# Patient Record
Sex: MALE | Race: WHITE | NOT HISPANIC OR LATINO | Employment: OTHER | URBAN - METROPOLITAN AREA
[De-identification: names, ages, dates, MRNs, and addresses within clinical notes are randomized per-mention and may not be internally consistent; named-entity substitution may affect disease eponyms.]

---

## 2017-02-28 ENCOUNTER — ALLSCRIPTS OFFICE VISIT (OUTPATIENT)
Dept: OTHER | Facility: OTHER | Age: 82
End: 2017-02-28

## 2017-03-10 ENCOUNTER — HOSPITAL ENCOUNTER (OUTPATIENT)
Dept: NON INVASIVE DIAGNOSTICS | Facility: HOSPITAL | Age: 82
Discharge: HOME | End: 2017-03-10
Payer: MEDICARE

## 2017-03-10 DIAGNOSIS — I48.91 AFIB (HCC): ICD-10-CM

## 2017-03-10 PROCEDURE — 93226 XTRNL ECG REC<48 HR SCAN A/R: CPT

## 2017-03-10 PROCEDURE — 93225 XTRNL ECG REC<48 HRS REC: CPT

## 2017-04-06 ENCOUNTER — APPOINTMENT (EMERGENCY)
Dept: RADIOLOGY | Facility: HOSPITAL | Age: 82
End: 2017-04-06
Payer: MEDICARE

## 2017-04-06 ENCOUNTER — HOSPITAL ENCOUNTER (EMERGENCY)
Facility: HOSPITAL | Age: 82
Discharge: HOME | End: 2017-04-06
Attending: EMERGENCY MEDICINE | Admitting: EMERGENCY MEDICINE
Payer: MEDICARE

## 2017-04-06 ENCOUNTER — GENERIC CONVERSION - ENCOUNTER (OUTPATIENT)
Dept: OTHER | Facility: OTHER | Age: 82
End: 2017-04-06

## 2017-04-06 VITALS
DIASTOLIC BLOOD PRESSURE: 87 MMHG | TEMPERATURE: 98.4 F | BODY MASS INDEX: 29.35 KG/M2 | OXYGEN SATURATION: 94 % | HEART RATE: 53 BPM | RESPIRATION RATE: 19 BRPM | WEIGHT: 187 LBS | SYSTOLIC BLOOD PRESSURE: 167 MMHG | HEIGHT: 67 IN
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
## 2017-04-06 DIAGNOSIS — S39.012A LUMBAR STRAIN, INITIAL ENCOUNTER: ICD-10-CM

## 2017-04-06 DIAGNOSIS — R11.0 NAUSEA: ICD-10-CM

## 2017-04-06 LAB
ANION GAP SERPL CALCULATED.3IONS-SCNC: 7 MMOL/L (ref 4–13)
BASOPHILS # BLD MANUAL: 0.1 THOUSAND/UL (ref 0–0.1)
BASOPHILS NFR MAR MANUAL: 1 % (ref 0–1)
BUN SERPL-MCNC: 24 MG/DL (ref 5–25)
BURR CELLS BLD QL SMEAR: PRESENT
CALCIUM SERPL-MCNC: 8.4 MG/DL (ref 8.3–10.1)
CHLORIDE SERPL-SCNC: 107 MMOL/L (ref 100–108)
CO2 SERPL-SCNC: 24 MMOL/L (ref 21–32)
CREAT SERPL-MCNC: 1.28 MG/DL (ref 0.6–1.3)
EOSINOPHIL # BLD MANUAL: 0 THOUSAND/UL (ref 0–0.4)
EOSINOPHIL NFR BLD MANUAL: 0 % (ref 0–6)
ERYTHROCYTE [DISTWIDTH] IN BLOOD BY AUTOMATED COUNT: 13.5 % (ref 11.6–15.1)
GFR SERPL CREATININE-BSD FRML MDRD: 53 ML/MIN/1.73SQ M
GLUCOSE SERPL-MCNC: 189 MG/DL (ref 65–140)
HCT VFR BLD AUTO: 41.6 % (ref 36.5–49.3)
HGB BLD-MCNC: 14.1 G/DL (ref 12–17)
LYMPHOCYTES # BLD AUTO: 0.69 THOUSAND/UL (ref 0.6–4.47)
LYMPHOCYTES # BLD AUTO: 7 % (ref 14–44)
MCH RBC QN AUTO: 29.9 PG (ref 26.8–34.3)
MCHC RBC AUTO-ENTMCNC: 33.9 G/DL (ref 31.4–37.4)
MCV RBC AUTO: 88 FL (ref 82–98)
MONOCYTES # BLD AUTO: 0.39 THOUSAND/UL (ref 0–1.22)
MONOCYTES NFR BLD: 4 % (ref 4–12)
NEUTROPHILS # BLD MANUAL: 8.52 THOUSAND/UL (ref 1.85–7.62)
NEUTS SEG NFR BLD AUTO: 87 % (ref 43–75)
NRBC BLD AUTO-RTO: 0 /100 WBCS
NT-PROBNP SERPL-MCNC: 3717 PG/ML
PLATELET # BLD AUTO: 117 THOUSANDS/UL (ref 149–390)
PLATELET BLD QL SMEAR: ABNORMAL
PMV BLD AUTO: 10.4 FL (ref 8.9–12.7)
POIKILOCYTOSIS BLD QL SMEAR: PRESENT
POTASSIUM SERPL-SCNC: 3.8 MMOL/L (ref 3.5–5.3)
RBC # BLD AUTO: 4.71 MILLION/UL (ref 3.88–5.62)
RBC MORPH BLD: PRESENT
SODIUM SERPL-SCNC: 138 MMOL/L (ref 136–145)
SPECIMEN SOURCE: NORMAL
TROPONIN I BLD-MCNC: 0 NG/ML (ref 0–0.08)
VARIANT LYMPHS # BLD AUTO: 1 %
WBC # BLD AUTO: 9.79 THOUSAND/UL (ref 4.31–10.16)

## 2017-04-06 PROCEDURE — 70450 CT HEAD/BRAIN W/O DYE: CPT

## 2017-04-06 PROCEDURE — 93005 ELECTROCARDIOGRAM TRACING: CPT | Performed by: EMERGENCY MEDICINE

## 2017-04-06 PROCEDURE — 96374 THER/PROPH/DIAG INJ IV PUSH: CPT

## 2017-04-06 PROCEDURE — 71020 HB CHEST X-RAY 2VW FRONTAL&LATL: CPT

## 2017-04-06 PROCEDURE — 96375 TX/PRO/DX INJ NEW DRUG ADDON: CPT

## 2017-04-06 PROCEDURE — 85007 BL SMEAR W/DIFF WBC COUNT: CPT | Performed by: EMERGENCY MEDICINE

## 2017-04-06 PROCEDURE — 99285 EMERGENCY DEPT VISIT HI MDM: CPT

## 2017-04-06 PROCEDURE — 83880 ASSAY OF NATRIURETIC PEPTIDE: CPT | Performed by: EMERGENCY MEDICINE

## 2017-04-06 PROCEDURE — 85027 COMPLETE CBC AUTOMATED: CPT | Performed by: EMERGENCY MEDICINE

## 2017-04-06 PROCEDURE — 84484 ASSAY OF TROPONIN QUANT: CPT

## 2017-04-06 PROCEDURE — 80048 BASIC METABOLIC PNL TOTAL CA: CPT | Performed by: EMERGENCY MEDICINE

## 2017-04-06 RX ORDER — ONDANSETRON 2 MG/ML
4 INJECTION INTRAMUSCULAR; INTRAVENOUS ONCE
Status: COMPLETED | OUTPATIENT
Start: 2017-04-06 | End: 2017-04-06

## 2017-04-06 RX ORDER — ONDANSETRON 4 MG/1
4 TABLET, FILM COATED ORAL EVERY 6 HOURS
Qty: 120 TABLET | Refills: 0 | Status: SHIPPED | OUTPATIENT
Start: 2017-04-06 | End: 2017-12-13

## 2017-04-06 RX ORDER — FUROSEMIDE 10 MG/ML
20 INJECTION INTRAMUSCULAR; INTRAVENOUS ONCE
Status: COMPLETED | OUTPATIENT
Start: 2017-04-06 | End: 2017-04-06

## 2017-04-06 RX ADMIN — ONDANSETRON 4 MG: 2 INJECTION INTRAMUSCULAR; INTRAVENOUS at 11:05

## 2017-04-06 RX ADMIN — FUROSEMIDE 20 MG: 10 INJECTION, SOLUTION INTRAMUSCULAR; INTRAVENOUS at 13:01

## 2017-04-07 LAB
ATRIAL RATE: 258 BPM
P AXIS: 232 DEGREES
QRS AXIS: 31 DEGREES
QRSD INTERVAL: 90 MS
QT INTERVAL: 438 MS
QTC INTERVAL: 419 MS
T WAVE AXIS: -48 DEGREES
VENTRICULAR RATE: 55 BPM

## 2017-06-02 ENCOUNTER — APPOINTMENT (EMERGENCY)
Dept: CT IMAGING | Facility: HOSPITAL | Age: 82
End: 2017-06-02
Payer: MEDICARE

## 2017-06-02 ENCOUNTER — HOSPITAL ENCOUNTER (EMERGENCY)
Facility: HOSPITAL | Age: 82
Discharge: HOME | End: 2017-06-02
Attending: EMERGENCY MEDICINE | Admitting: EMERGENCY MEDICINE
Payer: MEDICARE

## 2017-06-02 ENCOUNTER — APPOINTMENT (EMERGENCY)
Dept: RADIOLOGY | Facility: HOSPITAL | Age: 82
End: 2017-06-02
Payer: MEDICARE

## 2017-06-02 VITALS
RESPIRATION RATE: 20 BRPM | OXYGEN SATURATION: 96 % | SYSTOLIC BLOOD PRESSURE: 135 MMHG | DIASTOLIC BLOOD PRESSURE: 78 MMHG | HEART RATE: 59 BPM | TEMPERATURE: 97.7 F | BODY MASS INDEX: 28.45 KG/M2 | WEIGHT: 181.66 LBS

## 2017-06-02 DIAGNOSIS — S09.90XA CLOSED HEAD INJURY, INITIAL ENCOUNTER: ICD-10-CM
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
## 2017-06-02 DIAGNOSIS — S41.111A SKIN TEAR OF RIGHT UPPER ARM WITHOUT COMPLICATION, INITIAL ENCOUNTER: Primary | ICD-10-CM

## 2017-06-02 DIAGNOSIS — S00.03XA HEMATOMA OF SCALP, INITIAL ENCOUNTER: ICD-10-CM

## 2017-06-02 PROCEDURE — 72100 X-RAY EXAM L-S SPINE 2/3 VWS: CPT

## 2017-06-02 PROCEDURE — 70450 CT HEAD/BRAIN W/O DYE: CPT

## 2017-06-02 PROCEDURE — 99284 EMERGENCY DEPT VISIT MOD MDM: CPT

## 2017-06-07 ENCOUNTER — GENERIC CONVERSION - ENCOUNTER (OUTPATIENT)
Dept: OTHER | Facility: OTHER | Age: 82
End: 2017-06-07

## 2017-06-07 ENCOUNTER — HOSPITAL ENCOUNTER (EMERGENCY)
Facility: HOSPITAL | Age: 82
Discharge: NURSING, REHAB OR LONG-TERM CARE | End: 2017-06-07
Attending: EMERGENCY MEDICINE | Admitting: EMERGENCY MEDICINE
Payer: MEDICARE

## 2017-06-07 VITALS
DIASTOLIC BLOOD PRESSURE: 81 MMHG | OXYGEN SATURATION: 99 % | RESPIRATION RATE: 18 BRPM | HEART RATE: 50 BPM | WEIGHT: 180.34 LBS | SYSTOLIC BLOOD PRESSURE: 146 MMHG | TEMPERATURE: 97.6 F | BODY MASS INDEX: 28.24 KG/M2

## 2017-06-07 DIAGNOSIS — R53.83 FATIGUE: Primary | ICD-10-CM

## 2017-06-07 DIAGNOSIS — R41.0 TRANSIENT DISORIENTATION: ICD-10-CM

## 2017-06-07 LAB
ALBUMIN SERPL BCP-MCNC: 3.4 G/DL (ref 3.5–5)
ALP SERPL-CCNC: 65 U/L (ref 46–116)
ALT SERPL W P-5'-P-CCNC: 12 U/L (ref 12–78)
ANION GAP SERPL CALCULATED.3IONS-SCNC: 8 MMOL/L (ref 4–13)
AST SERPL W P-5'-P-CCNC: 15 U/L (ref 5–45)
ATRIAL RATE: 326 BPM
BASOPHILS # BLD AUTO: 0.03 THOUSANDS/ΜL (ref 0–0.1)
BASOPHILS NFR BLD AUTO: 1 % (ref 0–1)
BILIRUB SERPL-MCNC: 0.8 MG/DL (ref 0.2–1)
BUN SERPL-MCNC: 23 MG/DL (ref 5–25)
CALCIUM SERPL-MCNC: 8.5 MG/DL (ref 8.3–10.1)
CHLORIDE SERPL-SCNC: 109 MMOL/L (ref 100–108)
CLARITY, POC: CLEAR
CO2 SERPL-SCNC: 28 MMOL/L (ref 21–32)
COLOR, POC: YELLOW
CREAT SERPL-MCNC: 1.38 MG/DL (ref 0.6–1.3)
EOSINOPHIL # BLD AUTO: 0.13 THOUSAND/ΜL (ref 0–0.61)
EOSINOPHIL NFR BLD AUTO: 3 % (ref 0–6)
ERYTHROCYTE [DISTWIDTH] IN BLOOD BY AUTOMATED COUNT: 14.2 % (ref 11.6–15.1)
EXT BILIRUBIN, UA: ABNORMAL
EXT BLOOD URINE: ABNORMAL
EXT GLUCOSE, UA: ABNORMAL
EXT KETONES: ABNORMAL
EXT NITRITE, UA: ABNORMAL
EXT PH, UA: 6.5
EXT PROTEIN, UA: ABNORMAL
EXT SPECIFIC GRAVITY, UA: 1.01
EXT UROBILINOGEN: 0.2
GFR SERPL CREATININE-BSD FRML MDRD: 48.6 ML/MIN/1.73SQ M
GLUCOSE SERPL-MCNC: 96 MG/DL (ref 65–140)
HCT VFR BLD AUTO: 39.6 % (ref 36.5–49.3)
HGB BLD-MCNC: 13.3 G/DL (ref 12–17)
LYMPHOCYTES # BLD AUTO: 1.21 THOUSANDS/ΜL (ref 0.6–4.47)
LYMPHOCYTES NFR BLD AUTO: 24 % (ref 14–44)
MCH RBC QN AUTO: 30 PG (ref 26.8–34.3)
MCHC RBC AUTO-ENTMCNC: 33.6 G/DL (ref 31.4–37.4)
MCV RBC AUTO: 89 FL (ref 82–98)
MONOCYTES # BLD AUTO: 0.49 THOUSAND/ΜL (ref 0.17–1.22)
MONOCYTES NFR BLD AUTO: 10 % (ref 4–12)
NEUTROPHILS # BLD AUTO: 3.29 THOUSANDS/ΜL (ref 1.85–7.62)
NEUTS SEG NFR BLD AUTO: 62 % (ref 43–75)
PLATELET # BLD AUTO: 143 THOUSANDS/UL (ref 149–390)
PMV BLD AUTO: 10.2 FL (ref 8.9–12.7)
POTASSIUM SERPL-SCNC: 3.8 MMOL/L (ref 3.5–5.3)
PROT SERPL-MCNC: 6.6 G/DL (ref 6.4–8.2)
QRS AXIS: 15 DEGREES
QRSD INTERVAL: 82 MS
QT INTERVAL: 444 MS
QTC INTERVAL: 428 MS
RBC # BLD AUTO: 4.44 MILLION/UL (ref 3.88–5.62)
SODIUM SERPL-SCNC: 145 MMOL/L (ref 136–145)
T WAVE AXIS: 36 DEGREES
VENTRICULAR RATE: 56 BPM
WBC # BLD AUTO: 5.15 THOUSAND/UL (ref 4.31–10.16)
WBC # BLD EST: ABNORMAL 10*3/UL

## 2017-06-07 PROCEDURE — 93005 ELECTROCARDIOGRAM TRACING: CPT

## 2017-06-07 PROCEDURE — 99285 EMERGENCY DEPT VISIT HI MDM: CPT

## 2017-06-07 PROCEDURE — 85025 COMPLETE CBC W/AUTO DIFF WBC: CPT | Performed by: EMERGENCY MEDICINE

## 2017-06-07 PROCEDURE — 81002 URINALYSIS NONAUTO W/O SCOPE: CPT | Performed by: EMERGENCY MEDICINE

## 2017-06-07 PROCEDURE — 36415 COLL VENOUS BLD VENIPUNCTURE: CPT | Performed by: EMERGENCY MEDICINE

## 2017-06-07 PROCEDURE — 80053 COMPREHEN METABOLIC PANEL: CPT | Performed by: EMERGENCY MEDICINE

## 2017-06-10 ENCOUNTER — HOSPITAL ENCOUNTER (EMERGENCY)
Facility: HOSPITAL | Age: 82
Discharge: HOME | End: 2017-06-10
Attending: EMERGENCY MEDICINE | Admitting: EMERGENCY MEDICINE
Payer: MEDICARE

## 2017-06-10 VITALS
OXYGEN SATURATION: 98 % | DIASTOLIC BLOOD PRESSURE: 62 MMHG | TEMPERATURE: 97.6 F | RESPIRATION RATE: 16 BRPM | HEART RATE: 57 BPM | SYSTOLIC BLOOD PRESSURE: 128 MMHG
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
## 2017-06-10 PROCEDURE — 99283 EMERGENCY DEPT VISIT LOW MDM: CPT

## 2017-08-11 ENCOUNTER — ALLSCRIPTS OFFICE VISIT (OUTPATIENT)
Dept: OTHER | Facility: OTHER | Age: 82
End: 2017-08-11

## 2017-08-11 DIAGNOSIS — I48.91 ATRIAL FIBRILLATION (HCC): ICD-10-CM

## 2017-08-11 DIAGNOSIS — I10 ESSENTIAL (PRIMARY) HYPERTENSION: ICD-10-CM

## 2017-08-11 DIAGNOSIS — E78.5 HYPERLIPIDEMIA: ICD-10-CM

## 2017-08-22 ENCOUNTER — HOSPITAL ENCOUNTER (OUTPATIENT)
Dept: NON INVASIVE DIAGNOSTICS | Facility: HOSPITAL | Age: 82
Discharge: HOME | End: 2017-08-22
Payer: MEDICARE

## 2017-08-22 DIAGNOSIS — I10 ESSENTIAL (PRIMARY) HYPERTENSION: ICD-10-CM

## 2017-08-22 DIAGNOSIS — I48.91 ATRIAL FIBRILLATION (HCC): ICD-10-CM

## 2017-08-22 DIAGNOSIS — E78.5 HYPERLIPIDEMIA: ICD-10-CM

## 2017-08-22 PROCEDURE — 93225 XTRNL ECG REC<48 HRS REC: CPT

## 2017-08-22 PROCEDURE — 93226 XTRNL ECG REC<48 HR SCAN A/R: CPT

## 2017-09-08 ENCOUNTER — HOSPITAL ENCOUNTER (OUTPATIENT)
Dept: NON INVASIVE DIAGNOSTICS | Facility: HOSPITAL | Age: 82
Discharge: HOME | End: 2017-09-08
Payer: MEDICARE

## 2017-09-08 DIAGNOSIS — I48.91 ATRIAL FIBRILLATION (HCC): ICD-10-CM

## 2017-09-08 PROCEDURE — 93225 XTRNL ECG REC<48 HRS REC: CPT

## 2017-09-08 PROCEDURE — 93226 XTRNL ECG REC<48 HR SCAN A/R: CPT

## 2017-12-13 ENCOUNTER — HOSPITAL ENCOUNTER (INPATIENT)
Facility: HOSPITAL | Age: 82
LOS: 3 days | Discharge: NURSING, REHAB OR LONG-TERM CARE | DRG: 551 | End: 2017-12-16
Attending: SURGERY | Admitting: SURGERY
Payer: MEDICARE

## 2017-12-13 ENCOUNTER — APPOINTMENT (EMERGENCY)
Dept: CT IMAGING | Facility: HOSPITAL | Age: 82
End: 2017-12-13
Payer: MEDICARE

## 2017-12-13 ENCOUNTER — HOSPITAL ENCOUNTER (EMERGENCY)
Facility: HOSPITAL | Age: 82
End: 2017-12-13
Attending: EMERGENCY MEDICINE | Admitting: EMERGENCY MEDICINE
Payer: MEDICARE

## 2017-12-13 VITALS
DIASTOLIC BLOOD PRESSURE: 81 MMHG | OXYGEN SATURATION: 98 % | RESPIRATION RATE: 18 BRPM | HEART RATE: 63 BPM | TEMPERATURE: 98.3 F | SYSTOLIC BLOOD PRESSURE: 168 MMHG | BODY MASS INDEX: 27.55 KG/M2 | WEIGHT: 175.93 LBS
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
## 2017-12-13 DIAGNOSIS — I60.9 SUBARACHNOID HEMORRHAGE (HCC): ICD-10-CM

## 2017-12-13 DIAGNOSIS — S06.5X9A SUBDURAL HEMATOMA, ACUTE (HCC): Primary | ICD-10-CM

## 2017-12-13 DIAGNOSIS — I48.91 ATRIAL FIBRILLATION (HCC): Chronic | ICD-10-CM

## 2017-12-13 DIAGNOSIS — S22.39XA CLOSED RIB FRACTURE: ICD-10-CM

## 2017-12-13 DIAGNOSIS — I60.9 SUBARACHNOID BLEED (HCC): ICD-10-CM

## 2017-12-13 DIAGNOSIS — S06.5X9A SUBDURAL HEMATOMA (HCC): Primary | ICD-10-CM

## 2017-12-13 DIAGNOSIS — S22.078A OTHER CLOSED FRACTURE OF NINTH THORACIC VERTEBRA, INITIAL ENCOUNTER (HCC): ICD-10-CM

## 2017-12-13 LAB
ALBUMIN SERPL BCP-MCNC: 3.1 G/DL (ref 3.5–5)
ALP SERPL-CCNC: 53 U/L (ref 46–116)
ALT SERPL W P-5'-P-CCNC: 14 U/L (ref 12–78)
ANION GAP SERPL CALCULATED.3IONS-SCNC: 8 MMOL/L (ref 4–13)
APTT PPP: 33 SECONDS (ref 23–35)
AST SERPL W P-5'-P-CCNC: 29 U/L (ref 5–45)
BASOPHILS # BLD AUTO: 0.02 THOUSANDS/ΜL (ref 0–0.1)
BASOPHILS NFR BLD AUTO: 0 % (ref 0–1)
BILIRUB SERPL-MCNC: 1.5 MG/DL (ref 0.2–1)
BUN SERPL-MCNC: 24 MG/DL (ref 5–25)
CALCIUM SERPL-MCNC: 8.2 MG/DL (ref 8.3–10.1)
CHLORIDE SERPL-SCNC: 106 MMOL/L (ref 100–108)
CO2 SERPL-SCNC: 23 MMOL/L (ref 21–32)
CREAT SERPL-MCNC: 1.32 MG/DL (ref 0.6–1.3)
EOSINOPHIL # BLD AUTO: 0.04 THOUSAND/ΜL (ref 0–0.61)
EOSINOPHIL NFR BLD AUTO: 0 % (ref 0–6)
ERYTHROCYTE [DISTWIDTH] IN BLOOD BY AUTOMATED COUNT: 14 % (ref 11.6–15.1)
GFR SERPL CREATININE-BSD FRML MDRD: 48 ML/MIN/1.73SQ M
GLUCOSE SERPL-MCNC: 125 MG/DL (ref 65–140)
HCT VFR BLD AUTO: 38.7 % (ref 36.5–49.3)
HGB BLD-MCNC: 12.8 G/DL (ref 12–17)
INR PPP: 1.15 (ref 0.86–1.16)
LYMPHOCYTES # BLD AUTO: 0.53 THOUSANDS/ΜL (ref 0.6–4.47)
LYMPHOCYTES NFR BLD AUTO: 6 % (ref 14–44)
MCH RBC QN AUTO: 30.5 PG (ref 26.8–34.3)
MCHC RBC AUTO-ENTMCNC: 33.1 G/DL (ref 31.4–37.4)
MCV RBC AUTO: 92 FL (ref 82–98)
MONOCYTES # BLD AUTO: 0.98 THOUSAND/ΜL (ref 0.17–1.22)
MONOCYTES NFR BLD AUTO: 11 % (ref 4–12)
NEUTROPHILS # BLD AUTO: 7.72 THOUSANDS/ΜL (ref 1.85–7.62)
NEUTS SEG NFR BLD AUTO: 83 % (ref 43–75)
PLATELET # BLD AUTO: 110 THOUSANDS/UL (ref 149–390)
PMV BLD AUTO: 10.4 FL (ref 8.9–12.7)
POTASSIUM SERPL-SCNC: 4.7 MMOL/L (ref 3.5–5.3)
PROT SERPL-MCNC: 6.5 G/DL (ref 6.4–8.2)
PROTHROMBIN TIME: 15.1 SECONDS (ref 12.1–14.4)
RBC # BLD AUTO: 4.19 MILLION/UL (ref 3.88–5.62)
SODIUM SERPL-SCNC: 137 MMOL/L (ref 136–145)
WBC # BLD AUTO: 9.29 THOUSAND/UL (ref 4.31–10.16)

## 2017-12-13 PROCEDURE — 72131 CT LUMBAR SPINE W/O DYE: CPT

## 2017-12-13 PROCEDURE — 99285 EMERGENCY DEPT VISIT HI MDM: CPT

## 2017-12-13 PROCEDURE — 85025 COMPLETE CBC W/AUTO DIFF WBC: CPT | Performed by: PHYSICIAN ASSISTANT

## 2017-12-13 PROCEDURE — 70450 CT HEAD/BRAIN W/O DYE: CPT

## 2017-12-13 PROCEDURE — 36415 COLL VENOUS BLD VENIPUNCTURE: CPT | Performed by: PHYSICIAN ASSISTANT

## 2017-12-13 PROCEDURE — 85730 THROMBOPLASTIN TIME PARTIAL: CPT | Performed by: PHYSICIAN ASSISTANT

## 2017-12-13 PROCEDURE — 72128 CT CHEST SPINE W/O DYE: CPT

## 2017-12-13 PROCEDURE — 80053 COMPREHEN METABOLIC PANEL: CPT | Performed by: PHYSICIAN ASSISTANT

## 2017-12-13 PROCEDURE — 85610 PROTHROMBIN TIME: CPT | Performed by: PHYSICIAN ASSISTANT

## 2017-12-13 RX ORDER — ACETAMINOPHEN 325 MG/1
975 TABLET ORAL EVERY 8 HOURS SCHEDULED
Status: DISCONTINUED | OUTPATIENT
Start: 2017-12-13 | End: 2017-12-16 | Stop reason: HOSPADM

## 2017-12-13 RX ORDER — OXYCODONE HYDROCHLORIDE 5 MG/1
2.5 TABLET ORAL EVERY 4 HOURS PRN
Status: DISCONTINUED | OUTPATIENT
Start: 2017-12-13 | End: 2017-12-16 | Stop reason: HOSPADM

## 2017-12-13 RX ORDER — ACETAMINOPHEN 325 MG/1
975 TABLET ORAL ONCE
Status: COMPLETED | OUTPATIENT
Start: 2017-12-13 | End: 2017-12-13

## 2017-12-13 RX ORDER — GABAPENTIN 100 MG/1
100 CAPSULE ORAL
Status: DISCONTINUED | OUTPATIENT
Start: 2017-12-13 | End: 2017-12-16 | Stop reason: HOSPADM

## 2017-12-13 RX ADMIN — DESMOPRESSIN ACETATE 20 MCG: 4 SOLUTION INTRAVENOUS at 22:06

## 2017-12-13 RX ADMIN — ACETAMINOPHEN 975 MG: 325 TABLET, FILM COATED ORAL at 22:01

## 2017-12-13 RX ADMIN — GABAPENTIN 100 MG: 100 CAPSULE ORAL at 22:02

## 2017-12-13 RX ADMIN — ACETAMINOPHEN 975 MG: 325 TABLET ORAL at 17:01

## 2017-12-13 RX ADMIN — HYDROMORPHONE HYDROCHLORIDE 0.2 MG: 1 INJECTION, SOLUTION INTRAMUSCULAR; INTRAVENOUS; SUBCUTANEOUS at 22:57

## 2017-12-13 NOTE — EMTALA/ACUTE CARE TRANSFER
[address] [address] [address]  Dept: [phone]      EMTALA TRANSFER CONSENT    NAME Cassius Barrera                                         1/15/1929                              MRN [id number]    I have been informed of my rights regarding examination, treatment, and transfer   by Dr Fabián Carroll DO    Benefits: Specialized equipment and/or services available at the receiving facility (Include comment)________________________ (Trauma ICU)    Risks:        Consent for Transfer:  I acknowledge that my medical condition has been evaluated and explained to me by the emergency department physician or other qualified medical person and/or my attending physician, who has recommended that I be transferred to the service of  Accepting Physician: Dr Juanita Casas at [address] Name, [address] : [facility]   The above potential benefits of such transfer, the potential risks associated with such transfer, and the probable risks of not being transferred have been explained to me, and I fully understand them  The doctor has explained that, in my case, the benefits of transfer outweigh the risks  I agree to be transferred  I authorize the performance of emergency medical procedures and treatments upon me in both transit and upon arrival at the receiving facility  Additionally, I authorize the release of any and all medical records to the receiving facility and request they be transported with me, if possible  I understand that the safest mode of transportation during a medical emergency is an ambulance and that the Hospital advocates the use of this mode of transport  Risks of traveling to the receiving facility by car, including absence of medical control, life sustaining equipment, such as oxygen, and medical personnel has been explained to me and I fully understand them      (DESTINI CORRECT BOX BELOW)  [  ]  I consent to the stated transfer and to be transported by ambulance/helicopter  [  ]  I consent to the stated transfer, but refuse transportation by ambulance and accept full responsibility for my transportation by car  I understand the risks of non-ambulance transfers and I exonerate the Hospital and its staff from any deterioration in my condition that results from this refusal     X___________________________________________    DATE  17  TIME________  Signature of patient or legally responsible individual signing on patient behalf           RELATIONSHIP TO PATIENT_________________________          Provider Certification    NAME Kenneth Elliott                                        [facility] 1/15/1929                              MRN [id number]    A medical screening exam was performed on the above named patient  Based on the examination:    Condition Necessitating Transfer The primary encounter diagnosis was Subdural hematoma, acute ([address] )  Diagnoses of Subarachnoid hemorrhage ([address] ), Other closed fracture of ninth thoracic vertebra, initial encounter ([address] ), Fall, initial encounter, and Closed rib fracture were also pertinent to this visit      Patient Condition: The patient has been stabilized such that within reasonable medical probability, no material deterioration of the patient condition or the condition of the unborn child(mali) is likely to result from the transfer    Reason for Transfer: Level of Care needed not available at this facility    Transfer Requirements: [facility] k[facility]    · Space available and qualified personnel available for treatment as acknowledged by    · Agreed to accept transfer and to provide appropriate medical treatment as acknowledged by       Dr Chandan Reyes  · Appropriate medical records of the examination and treatment of the patient are provided at the time of transfer   [address] _______  · Transfer will be performed by qualified personnel from    and appropriate transfer equipment as required, including the use of necessary and appropriate life support measures  Provider Certification: I have examined the patient and explained the following risks and benefits of being transferred/refusing transfer to the patient/family:  Unanticipated needs of medical equipment and personnel during transport, Risk of worsening condition, General risk, such as traffic hazards, adverse weather conditions, rough terrain or turbulence, possible failure of equipment (including vehicle or aircraft), or consequences of actions of persons outside the control of the transport personnel      Based on these reasonable risks and benefits to the patient and/or the unborn child(mali), and based upon the information available at the time of the patients examination, I certify that the medical benefits reasonably to be expected from the provision of appropriate medical treatments at another medical facility outweigh the increasing risks, if any, to the individuals medical condition, and in the case of labor to the unborn child, from effecting the transfer      X____________________________________________ DATE 12/13/17        TIME_______      ORIGINAL - SEND TO MEDICAL RECORDS   COPY - SEND WITH PATIENT DURING TRANSFER

## 2017-12-13 NOTE — ED PROVIDER NOTES
1/2 tab    magnesium hydroxide (MILK OF MAGNESIA) 400 mg/5 mL oral suspension   Yes No   Sig: Take 30 mL by mouth daily as needed for constipation   sodium chloride (OCEAN) 0 65 % nasal spray   Yes No   Si sprays into each nostril 3 (three) times a day  Facility-Administered Medications: None       Past Medical History:   Diagnosis Date    Actinic keratosis     Anemia     chronic hemolytic anemia    Atrial fibrillation (HCC)     Benign prostatic hypertrophy     Blood in urine     Cellulitis     Dermatitis     Disease of thyroid gland     Dyslipidemia     Epidural hematoma (HCC)     Epistaxis     Falling     Folate deficiency     GERD (gastroesophageal reflux disease)     Hip fracture (HCC)     Hyperlipidemia     Hypertension     Hypothyroidism (acquired)     Insomnia     Neuropathy     Nocturia     Osteoarthritis     Psychiatric disorder     depression    Renal disorder     Acute renal failure    Sepsis (HCC)     Shoulder pain, right     Squamous cell carcinoma     Subdural hematoma (HCC)     Thrombophlebitis     Urinary retention        Past Surgical History:   Procedure Laterality Date    HIP SURGERY         History reviewed  No pertinent family history  I have reviewed and agree with the history as documented  Social History   Substance Use Topics    Smoking status: Former Smoker     Quit date: 1976    Smokeless tobacco: Never Used    Alcohol use Yes      Comment: once a week        Review of Systems   Constitutional: Negative for activity change, appetite change, chills, fatigue and fever  HENT: Negative for ear pain, sneezing and sore throat  Eyes: Negative for pain and visual disturbance  Respiratory: Negative for cough and shortness of breath  Cardiovascular: Negative for chest pain and palpitations  Gastrointestinal: Negative for abdominal pain, blood in stool, constipation, diarrhea, nausea and vomiting     Genitourinary: Negative for dysuria and hematuria  Musculoskeletal: Negative for arthralgias, neck pain and neck stiffness  Skin: Negative for rash and wound  Neurological: Negative for dizziness, weakness, light-headedness, numbness and headaches  All other systems reviewed and are negative  Physical Exam  ED Triage Vitals [12/13/17 1557]   Temperature Pulse Respirations Blood Pressure SpO2   98 3 °F (36 8 °C) 63 18 168/81 98 %      Temp Source Heart Rate Source Patient Position - Orthostatic VS BP Location FiO2 (%)   Oral Monitor Sitting Right arm --      Pain Score       7           Orthostatic Vital Signs  Vitals:    12/13/17 1557   BP: 168/81   Pulse: 63   Patient Position - Orthostatic VS: Sitting       Physical Exam   Constitutional: He appears well-developed and well-nourished  No distress  HENT:   Head: Normocephalic and atraumatic  No hemotympanum bilaterally   Eyes: Conjunctivae and EOM are normal  Pupils are equal, round, and reactive to light  Neck: Normal range of motion  Neck supple  Cardiovascular: Normal rate, regular rhythm, normal heart sounds and intact distal pulses  Exam reveals no gallop and no friction rub  No murmur heard  Pulmonary/Chest: Effort normal and breath sounds normal  No respiratory distress  He has no wheezes  He has no rales  He exhibits no tenderness  Abdominal: Soft  Bowel sounds are normal  He exhibits no distension  There is no tenderness  There is no guarding  Musculoskeletal: He exhibits tenderness  He exhibits no deformity  Midline thoracic and lumbar tenderness   Neurological: He is alert  No cranial nerve deficit or sensory deficit  He exhibits normal muscle tone  Oriented to self and location   Skin: Skin is warm  Capillary refill takes less than 2 seconds  He is not diaphoretic  No pallor         ED Medications  Medications   acetaminophen (TYLENOL) tablet 975 mg (975 mg Oral Given 12/13/17 1701)       Diagnostic Studies  Results Reviewed     Procedure Component Value Units Date/Time    Comprehensive metabolic panel [[id number]]  (Abnormal) Collected:  12/13/17 1851    Lab Status:  Final result Specimen:  Blood from Arm, Right Updated:  12/13/17 1921     Sodium 137 mmol/L      Potassium 4 7 mmol/L      Chloride 106 mmol/L      CO2 23 mmol/L      Anion Gap 8 mmol/L      BUN 24 mg/dL      Creatinine 1 32 (H) mg/dL      Glucose 125 mg/dL      Calcium 8 2 (L) mg/dL      AST 29 U/L      ALT 14 U/L      Alkaline Phosphatase 53 U/L      Total Protein 6 5 g/dL      Albumin 3 1 (L) g/dL      Total Bilirubin 1 50 (H) mg/dL      eGFR 48 ml/min/1 73sq m     Narrative:         National Kidney Disease Education Program recommendations are as follows:  GFR calculation is accurate only with a steady state creatinine  Chronic Kidney disease less than 60 ml/min/1 73 sq  meters  Kidney failure less than 15 ml/min/1 73 sq  meters  Protime-INR [[id number]]  (Abnormal) Collected:  12/13/17 1851    Lab Status:  Final result Specimen:  Blood from Arm, Right Updated:  12/13/17 1920     Protime 15 1 (H) seconds      INR 1 15    APTT [[id number]]  (Normal) Collected:  12/13/17 1851    Lab Status:  Final result Specimen:  Blood from Arm, Right Updated:  12/13/17 1920     PTT 33 seconds     Narrative:          Therapeutic Heparin Range = 60-90 seconds    CBC and differential [[id number]]  (Abnormal) Collected:  12/13/17 1851    Lab Status:  Final result Specimen:  Blood from Arm, Right Updated:  12/13/17 1858     WBC 9 29 Thousand/uL      RBC 4 19 Million/uL      Hemoglobin 12 8 g/dL      Hematocrit 38 7 %      MCV 92 fL      MCH 30 5 pg      MCHC 33 1 g/dL      RDW 14 0 %      MPV 10 4 fL      Platelets 362 (L) Thousands/uL      Neutrophils Relative 83 (H) %      Lymphocytes Relative 6 (L) %      Monocytes Relative 11 %      Eosinophils Relative 0 %      Basophils Relative 0 %      Neutrophils Absolute 7 72 (H) Thousands/µL      Lymphocytes Absolute 0 53 (L) Thousands/µL      Monocytes Absolute 0 98 Thousand/µL Eosinophils Absolute 0 04 Thousand/µL      Basophils Absolute 0 02 Thousands/µL                  CT head without contrast   Final Result by Ramonia Apgar, MD ( 1800)         Small foci of subdural and subarachnoid hemorrhage over the left parietal convexity, bilateral anterior-inferior frontal region and along the falx  No mass effect  Workstation performed: [id number]         CT spine thoracic & lumbar wo contrast   Final Result by Ramonia Apgar, MD ( 1757)         1  Distracted T9 fracture with widening of the anterior T8-9 disc space, compatible with disc and ligamentous injury  Widening of the left T8-9 facet suggesting capsular injury  2  Chronic L1 and L2 fractures with possible superimposed acute fractures  MRI may be helpful for further evaluation  3  Fracture of the left T9 rib with the costovertebral junction  Workstation performed: [id number]                    Procedures  Procedures       Phone Contacts  ED Phone Contact    ED Course  ED Course                                MDM  Number of Diagnoses or Management Options  Closed rib fracture: new and requires workup  Fall, initial encounter: new and requires workup  Other closed fracture of ninth thoracic vertebra, initial encounter [facility]): new and requires workup  Subarachnoid hemorrhage [facility]): new and requires workup  Subdural hematoma, acute [facility]): new and requires workup  Diagnosis management comments: Differential diagnosis to include not limited to:  Subarachnoid hemorrhage, subdural hemorrhage, thoracic fracture, lumbar fracture,    Patient is a 75-year-old male presents emergency department for evaluation after fall  Patient states he was getting up from his recliner when he fell to the here  States he had his head  No loss of consciousness  Patient had a fall 2 days ago with head injury as well    Daughter states that patient has been very unstable on his feet which has led to his multiple falls  Patient currently complains of entire back pain as well as right-sided head pain  Patient does take an 80 ASA daily  On exam patient is in no acute distress  He is alert oriented to self and place  He is interactive and cooperative with provider  He does have midline thoracic and lumbar tenderness  Unable lie patient down flat secondary to intolerable pain  No other upper lower extremity joint tenderness  Flexor range of motion of upper and lower extremities  Chronic left shoulder pain secondary to ligamentous injury  Cranial nerves 2-12 fully intact with no focal deficits  Sensation fully intact  Plan will be to get CT head, thoracic spine, and lumbar spine  Will get baseline labs    Update:  Traumatic subarachnoid hemorrhage found on CT  New T9 thoracic fracture found on scan and a possible acute on chronic L1/L2 fracture  Also T9 rib fracture  At this time will put patient on thoracic and lumbar spine precautions  Will discussed with Trauma attending in transfer patient to [facility] to trauma team   As patient is only on 80 ASA daily, I do not believe patient needs DDAVP at this time for small subarachnoid hemorrhage  I discussed this with ED attending who agreed  Amount and/or Complexity of Data Reviewed  Clinical lab tests: ordered and reviewed    Risk of Complications, Morbidity, and/or Mortality  Presenting problems: moderate  Diagnostic procedures: high  Management options: moderate    Patient Progress  Patient progress: stable    The patient presented with a condition in which there was a high probability of imminent or life-threatening deterioration, and critical care services (excluding separately billable procedures) totalled 30-74 minutes          Disposition  Final diagnoses:   Subdural hematoma, acute (HCC)   Subarachnoid hemorrhage ([address] )   Other closed fracture of ninth thoracic vertebra, initial encounter ([address] )   Fall, initial encounter   Closed rib fracture Time reflects when diagnosis was documented in both MDM as applicable and the Disposition within this note     Time User Action Codes Description Comment    12/13/2017  6:43 PM DelpoDarell topete Fruits Add [I62 01] Subdural hematoma, acute ([facility] )     12/13/2017  6:43 PM Delp[place] Darell Fruits Add [I60 9] Subarachnoid hemorrhage ([facility] )     12/13/2017  6:43 PM [facility] [K32 003X] Other closed fracture of ninth thoracic vertebra, initial encounter ([facility] )     12/13/2017  6:44 PM Delpome Modesto Fruits Add [F88  MFXZ] Fall, initial encounter     12/13/2017  6:44 PM Delp[place] Darell Fruits Add [S22 39XA] Closed rib fracture       ED Disposition     ED Disposition Condition Comment    Transfer to Another [facility] should be transferred out to [facility]        MD Documentation    [employer] Most Recent Value   Patient Condition  The patient has been stabilized such that within reasonable medical probability, no material deterioration of the patient condition or the condition of the unborn child(mali) is likely to result from the transfer   Reason for Transfer  Level of Care needed not available at this facility   Benefits of Transfer  Specialized equipment and/or services available at the receiving facility (Include comment)________________________ Amanda Sessions   Accepting Physician  Dr Mary Galloway Name, Amilcar Mehta    Sending MD  [facility] Attending)    Provider Certification  Unanticipated needs of medical equipment and personnel during transport, Risk of worsening condition, General risk, such as traffic hazards, adverse weather conditions, rough terrain or turbulence, possible failure of equipment (including vehicle or aircraft), or consequences of actions of persons outside the control of the transport personnel      RN Documentation    [address] Name, [address] None       Discharge Medication List as of 12/13/2017  7:39 PM      CONTINUE these medications which have NOT CHANGED    Details   acetaminophen (TYLENOL) 325 mg tablet Take 650 mg by mouth 2 (two) times a day , Historical Med      aspirin 81 mg chewable tablet Chew 81 mg daily  , Until Discontinued, Historical Med      Cholecalciferol (VITAMIN D3) 2000 UNITS TABS Take 2,000 Units by mouth daily  , Until Discontinued, Historical Med      cyanocobalamin (VITAMIN B-12) 1,000 mcg tablet Take 1,000 mcg by mouth daily  , Until Discontinued, Historical Med      finasteride (PROSCAR) 5 mg tablet Take 5 mg by mouth daily  , Until Discontinued, Historical Med      furosemide (LASIX) 20 mg tablet Take 20 mg by mouth every morning Indications: take 1/2 tab , Until Discontinued, Historical Med      magnesium hydroxide (MILK OF MAGNESIA) 400 mg/5 mL oral suspension Take 30 mL by mouth daily as needed for constipation, Until Discontinued, Historical Med      sodium chloride (OCEAN) 0 65 % nasal spray 2 sprays into each nostril 3 (three) times a day , Until Discontinued, Historical Med      Tamsulosin HCl (FLOMAX PO) Take 0 4 mg by mouth daily at bedtime  , Until Discontinued, Historical Med      metoprolol tartrate (LOPRESSOR) 25 mg tablet Take 12 5 mg by mouth 2 (two) times a day, Until Discontinued, Historical Med      ondansetron (ZOFRAN) 4 mg tablet Take 1 tablet by mouth every 6 (six) hours for 30 days, Starting 4/6/2017, Until Sat 5/6/17, Print           No discharge procedures on file      ED Provider  Electronically Signed by           Alphonse Valladares PA-C  12/14/17 5147

## 2017-12-14 ENCOUNTER — APPOINTMENT (INPATIENT)
Dept: RADIOLOGY | Facility: HOSPITAL | Age: 82
DRG: 551 | End: 2017-12-14
Payer: MEDICARE

## 2017-12-14 LAB
ALBUMIN SERPL BCP-MCNC: 3.2 G/DL (ref 3.5–5)
ALP SERPL-CCNC: 51 U/L (ref 46–116)
ALT SERPL W P-5'-P-CCNC: 13 U/L (ref 12–78)
ANION GAP SERPL CALCULATED.3IONS-SCNC: 7 MMOL/L (ref 4–13)
APTT PPP: 32 SECONDS (ref 23–35)
AST SERPL W P-5'-P-CCNC: 12 U/L (ref 5–45)
ATRIAL RATE: 416 BPM
BASOPHILS # BLD AUTO: 0 THOUSANDS/ΜL (ref 0–0.1)
BASOPHILS NFR BLD AUTO: 0 % (ref 0–1)
BILIRUB SERPL-MCNC: 1.25 MG/DL (ref 0.2–1)
BUN SERPL-MCNC: 21 MG/DL (ref 5–25)
CALCIUM SERPL-MCNC: 8.2 MG/DL (ref 8.3–10.1)
CHLORIDE SERPL-SCNC: 107 MMOL/L (ref 100–108)
CO2 SERPL-SCNC: 26 MMOL/L (ref 21–32)
CREAT SERPL-MCNC: 1.15 MG/DL (ref 0.6–1.3)
EOSINOPHIL # BLD AUTO: 0 THOUSAND/ΜL (ref 0–0.61)
EOSINOPHIL NFR BLD AUTO: 0 % (ref 0–6)
ERYTHROCYTE [DISTWIDTH] IN BLOOD BY AUTOMATED COUNT: 14.2 % (ref 11.6–15.1)
GFR SERPL CREATININE-BSD FRML MDRD: 57 ML/MIN/1.73SQ M
GLUCOSE SERPL-MCNC: 135 MG/DL (ref 65–140)
HCT VFR BLD AUTO: 39.8 % (ref 36.5–49.3)
HGB BLD-MCNC: 13.3 G/DL (ref 12–17)
INR PPP: 1.25 (ref 0.86–1.16)
LYMPHOCYTES # BLD AUTO: 0.84 THOUSANDS/ΜL (ref 0.6–4.47)
LYMPHOCYTES NFR BLD AUTO: 10 % (ref 14–44)
MCH RBC QN AUTO: 30.4 PG (ref 26.8–34.3)
MCHC RBC AUTO-ENTMCNC: 33.4 G/DL (ref 31.4–37.4)
MCV RBC AUTO: 91 FL (ref 82–98)
MONOCYTES # BLD AUTO: 0.96 THOUSAND/ΜL (ref 0.17–1.22)
MONOCYTES NFR BLD AUTO: 12 % (ref 4–12)
NEUTROPHILS # BLD AUTO: 6.38 THOUSANDS/ΜL (ref 1.85–7.62)
NEUTS SEG NFR BLD AUTO: 78 % (ref 43–75)
NRBC BLD AUTO-RTO: 0 /100 WBCS
PLATELET # BLD AUTO: 119 THOUSANDS/UL (ref 149–390)
PMV BLD AUTO: 10.7 FL (ref 8.9–12.7)
POTASSIUM SERPL-SCNC: 3.9 MMOL/L (ref 3.5–5.3)
PROT SERPL-MCNC: 6.8 G/DL (ref 6.4–8.2)
PROTHROMBIN TIME: 15.8 SECONDS (ref 12.1–14.4)
QRS AXIS: 6 DEGREES
QRSD INTERVAL: 90 MS
QT INTERVAL: 416 MS
QTC INTERVAL: 408 MS
RBC # BLD AUTO: 4.38 MILLION/UL (ref 3.88–5.62)
SODIUM SERPL-SCNC: 140 MMOL/L (ref 136–145)
T WAVE AXIS: 34 DEGREES
VENTRICULAR RATE: 58 BPM
WBC # BLD AUTO: 8.19 THOUSAND/UL (ref 4.31–10.16)

## 2017-12-14 PROCEDURE — 72146 MRI CHEST SPINE W/O DYE: CPT

## 2017-12-14 PROCEDURE — 85025 COMPLETE CBC W/AUTO DIFF WBC: CPT | Performed by: EMERGENCY MEDICINE

## 2017-12-14 PROCEDURE — 85730 THROMBOPLASTIN TIME PARTIAL: CPT | Performed by: EMERGENCY MEDICINE

## 2017-12-14 PROCEDURE — 85610 PROTHROMBIN TIME: CPT | Performed by: EMERGENCY MEDICINE

## 2017-12-14 PROCEDURE — 80053 COMPREHEN METABOLIC PANEL: CPT | Performed by: EMERGENCY MEDICINE

## 2017-12-14 PROCEDURE — 70450 CT HEAD/BRAIN W/O DYE: CPT

## 2017-12-14 PROCEDURE — 93005 ELECTROCARDIOGRAM TRACING: CPT | Performed by: NURSE PRACTITIONER

## 2017-12-14 PROCEDURE — 94760 N-INVAS EAR/PLS OXIMETRY 1: CPT

## 2017-12-14 PROCEDURE — 72125 CT NECK SPINE W/O DYE: CPT

## 2017-12-14 RX ORDER — FINASTERIDE 5 MG/1
5 TABLET, FILM COATED ORAL DAILY
Status: DISCONTINUED | OUTPATIENT
Start: 2017-12-14 | End: 2017-12-16 | Stop reason: HOSPADM

## 2017-12-14 RX ORDER — LIDOCAINE 50 MG/G
2 PATCH TOPICAL DAILY
Status: DISCONTINUED | OUTPATIENT
Start: 2017-12-14 | End: 2017-12-16 | Stop reason: HOSPADM

## 2017-12-14 RX ORDER — METHOCARBAMOL 500 MG/1
500 TABLET, FILM COATED ORAL EVERY 8 HOURS SCHEDULED
Status: DISCONTINUED | OUTPATIENT
Start: 2017-12-14 | End: 2017-12-16 | Stop reason: HOSPADM

## 2017-12-14 RX ORDER — FUROSEMIDE 20 MG/1
10 TABLET ORAL DAILY
Status: DISCONTINUED | OUTPATIENT
Start: 2017-12-14 | End: 2017-12-16 | Stop reason: HOSPADM

## 2017-12-14 RX ORDER — TAMSULOSIN HYDROCHLORIDE 0.4 MG/1
0.4 CAPSULE ORAL
Status: DISCONTINUED | OUTPATIENT
Start: 2017-12-14 | End: 2017-12-16 | Stop reason: HOSPADM

## 2017-12-14 RX ADMIN — METHOCARBAMOL 500 MG: 500 TABLET ORAL at 13:40

## 2017-12-14 RX ADMIN — ACETAMINOPHEN 975 MG: 325 TABLET, FILM COATED ORAL at 13:40

## 2017-12-14 RX ADMIN — ACETAMINOPHEN 975 MG: 325 TABLET, FILM COATED ORAL at 05:15

## 2017-12-14 RX ADMIN — HYDROMORPHONE HYDROCHLORIDE 0.2 MG: 1 INJECTION, SOLUTION INTRAMUSCULAR; INTRAVENOUS; SUBCUTANEOUS at 07:17

## 2017-12-14 RX ADMIN — GABAPENTIN 100 MG: 100 CAPSULE ORAL at 22:16

## 2017-12-14 RX ADMIN — OXYCODONE HYDROCHLORIDE 2.5 MG: 5 TABLET ORAL at 22:16

## 2017-12-14 RX ADMIN — TAMSULOSIN HYDROCHLORIDE 0.4 MG: 0.4 CAPSULE ORAL at 16:14

## 2017-12-14 RX ADMIN — ACETAMINOPHEN 975 MG: 325 TABLET, FILM COATED ORAL at 22:15

## 2017-12-14 RX ADMIN — FUROSEMIDE 10 MG: 20 TABLET ORAL at 10:28

## 2017-12-14 RX ADMIN — HYDROMORPHONE HYDROCHLORIDE 0.2 MG: 1 INJECTION, SOLUTION INTRAMUSCULAR; INTRAVENOUS; SUBCUTANEOUS at 12:23

## 2017-12-14 RX ADMIN — OXYCODONE HYDROCHLORIDE 2.5 MG: 5 TABLET ORAL at 05:15

## 2017-12-14 RX ADMIN — LIDOCAINE 2 PATCH: 50 PATCH TOPICAL at 10:28

## 2017-12-14 RX ADMIN — METHOCARBAMOL 500 MG: 500 TABLET ORAL at 22:15

## 2017-12-14 RX ADMIN — OXYCODONE HYDROCHLORIDE 2.5 MG: 5 TABLET ORAL at 16:14

## 2017-12-14 RX ADMIN — FINASTERIDE 5 MG: 5 TABLET, FILM COATED ORAL at 10:28

## 2017-12-14 NOTE — CONSULTS
Orthopedics   Kenneth Elliott 80 y o  male MRN: [id number]  Unit/Bed#: [facility] 923-01      Chief Complaint:   Back Pain    HPI:   80 y o male with history of AFib who ambulates with walker status post fall complaining of thoracic or lumbar back pain  Patient lives at assisted living facility and was trying to transfer from chair to his walker when he tripped and fell  Patient began to complain of immediate back pain  Pain is well localized the thoracic lumbar spine patient denies pain elsewhere  Denies any numbness or tingling  Back pain is made worse with direct palpation affected area and attempted ambulation  Patient has history of lumbar compression fractures as previously been seen by Dr Rita Arguelles as an outpatient  Associated injuries include bilateral subdural hematoma as well as left posterior parietal subarachnoid hemorrhage      Review Of Systems:   · Skin:  Diffuse ecchymosis in upper and lower extremities  · Neuro: See HPI  · Musculoskeletal: See HPI  · 14 point review of systems negative except as stated above     Past Medical History:   Past Medical History:   Diagnosis Date    Actinic keratosis     Anemia     chronic hemolytic anemia    Atrial fibrillation (HCC)     Benign prostatic hypertrophy     Blood in urine     Cellulitis     Dermatitis     Disease of thyroid gland     Dyslipidemia     Epidural hematoma (HCC)     Epistaxis     Falling     Folate deficiency     GERD (gastroesophageal reflux disease)     Hip fracture (HCC)     Hyperlipidemia     Hypertension     Hypothyroidism (acquired)     Insomnia     Neuropathy     Nocturia     Osteoarthritis     Psychiatric disorder     depression    Renal disorder     Acute renal failure    Sepsis (HCC)     Shoulder pain, right     Squamous cell carcinoma     Subdural hematoma (HCC)     Thrombophlebitis     Urinary retention        Past Surgical History:   Past Surgical History:   Procedure Laterality Date    HIP SURGERY Family History:  Family history reviewed and non-contributory  History reviewed  No pertinent family history  Social History:  Social History     Social History    Marital status:       Spouse name: N/A    Number of children: N/A    Years of education: N/A     Social History Main Topics    Smoking status: Former Smoker     Quit date: 2/4/1976    Smokeless tobacco: Never Used    Alcohol use Yes      Comment: once a week    Drug use: No    Sexual activity: Not Asked     Other Topics Concern    None     Social History Narrative    None       Allergies:   No Known Allergies        Labs:    0  Lab Value Date/Time   HCT 39 8 12/14/2017 0554   HCT 38 7 12/13/2017 1851   HCT 39 6 06/07/2017 1055   HCT 27 5 (L) 01/26/2014 0555   HCT 29 5 (L) 01/25/2014 0609   HCT 29 4 (L) 01/24/2014 0613   HGB 13 3 12/14/2017 0554   HGB 12 8 12/13/2017 1851   HGB 13 3 06/07/2017 1055   HGB 9 2 (L) 01/26/2014 0555   HGB 9 7 (L) 01/25/2014 0609   HGB 9 7 (L) 01/24/2014 0613   INR 1 25 (H) 12/14/2017 0147   INR 1 44 (H) 01/24/2014 0613   WBC 8 19 12/14/2017 0554   WBC 9 29 12/13/2017 1851   WBC 5 15 06/07/2017 1055   WBC 6 08 01/26/2014 0555   WBC 6 23 01/24/2014 0613   WBC 6 21 01/23/2014 0516       Meds:    Current Facility-Administered Medications:     acetaminophen (TYLENOL) tablet 975 mg, 975 mg, Oral, Q8H [address], Isaura Jackson DO, 975 mg at 12/14/17 0515    finasteride (PROSCAR) tablet 5 mg, 5 mg, Oral, Daily, Ruben Jean PA-C, 5 mg at 12/14/17 1028    furosemide (LASIX) tablet 10 mg, 10 mg, Oral, Daily, Ruben Jean PA-C, 10 mg at 12/14/17 1028    gabapentin (NEURONTIN) capsule 100 mg, 100 mg, Oral, HS, Isaura Jackson DO, 100 mg at 12/13/17 2202    HYDROmorphone (DILAUDID) 1 mg/mL injection 0 2 mg, 0 2 mg, Intravenous, Q4H PRN, Isaura Jackson, , 0 2 mg at 12/14/17 1223    lidocaine (LIDODERM) 5 % patch 2 patch, 2 patch, Transdermal, Daily, Ruben Jean PA-C, 2 patch at 12/14/17 1028   methocarbamol (ROBAXIN) tablet 500 mg, 500 mg, Oral, Q8H [address], Ramiro Gray PA-C    oxyCODONE (ROXICODONE) IR tablet 2 5 mg, 2 5 mg, Oral, Q4H PRN, Williantonio Ramos, , 2 5 mg at 12/14/17 0515    tamsulosin (FLOMAX) capsule 0 4 mg, 0 4 mg, Oral, Daily With Von Arndt PA-C    Blood Culture:   No results found for: BLOODCX    Wound Culture:   No results found for: WOUNDCULT    Ins and Outs:  I/O last 24 hours: In: 300 [P O :300]  Out: 750 [Urine:750]          Physical Exam:   /82   Pulse 62   Temp 98 °F (36 7 °C) (Oral)   Resp 18   Ht 6' 2" (1 88 m)   Wt 80 6 kg (177 lb 11 1 oz)   SpO2 97%   BMI 22 81 kg/m²   Gen: Alert and oriented to person, place, time  HEENT: EOMI, eyes clear, moist mucus membranes, hearing intact  Respiratory: Bilateral chest rise  No audible wheezing found  Cardiovascular: Regular Rate and Rhythm  Abdomen: soft nontender/nondistended  Musculoskeletal: BACK  · Skin intact, diffuse ecchymoses upper lower extremities  · Tenderness to palpation along the thoracic spine, mild tenderness palpation along lumbar spine  · 4/5 strength in L2-S1 myotomes and bilateral lower extremities  · Sensation intact to light touch in L2-S1 dermatomes bilateral lower extremities  · +2 Achilles and patellar tendon reflex    Radiology:   I personally reviewed the films    CT of thoracic spine reveals T9 Chance fracture with associated T8-T9 disc space widening that is concerning for associated ligamentous injury  _*_*_*_*_*_*_*_*_*_*_*_*_*_*_*_*_*_*_*_*_*_*_*_*_*_*_*_*_*_*_*_*_*_*_*_*_*_*_*_*_*    Assessment:  88 y o male status post fall complaining of thoracic back pain without any associated neuro deficits    Plan:   · Weight-bearing as tolerated bilateral lower extremities in TLSO brace  · No plans for surgical intervention at this time  · Analgesics  · DVT per primary  · PT OT  · Will follow      Jasson Sheikh MD

## 2017-12-14 NOTE — PLAN OF CARE
Problem: PAIN - ADULT  Goal: Verbalizes/displays adequate comfort level or baseline comfort level  Interventions:  - Encourage patient to monitor pain and request assistance  - Assess pain using appropriate pain scale  - Administer analgesics based on type and severity of pain and evaluate response  - Implement non-pharmacological measures as appropriate and evaluate response  - Consider cultural and social influences on pain and pain management  - Notify physician/advanced practitioner if interventions unsuccessful or patient reports new pain  Outcome: Progressing      Problem: SAFETY ADULT  Goal: Maintain or return to baseline ADL function  INTERVENTIONS:  -  Assess patient's ability to carry out ADLs; assess patient's baseline for ADL function and identify physical deficits which impact ability to perform ADLs (bathing, care of mouth/teeth, toileting, grooming, dressing, etc )  - Assess/evaluate cause of self-care deficits   - Assess range of motion  - Assess patient's mobility; develop plan if impaired  - Assess patient's need for assistive devices and provide as appropriate  - Encourage maximum independence but intervene and supervise when necessary  ¯ Involve family in performance of ADLs  ¯ Assess for home care needs following discharge   ¯ Request OT consult to assist with ADL evaluation and planning for discharge  ¯ Provide patient education as appropriate  Outcome: Progressing    Goal: Maintain or return mobility status to optimal level  INTERVENTIONS:  - Assess patient's baseline mobility status (ambulation, transfers, stairs, etc )    - Identify cognitive and physical deficits and behaviors that affect mobility  - Identify mobility aids required to assist with transfers and/or ambulation (gait belt, sit-to-stand, lift, walker, cane, etc )  - [place] fall precautions as indicated by assessment  - Record patient progress and toleration of activity level on Mobility SBAR; progress patient to next Phase/Stage  - Instruct patient to call for assistance with activity based on assessment  - Request Rehabilitation consult to assist with strengthening/weightbearing, etc   Outcome: Progressing      Problem: DISCHARGE PLANNING  Goal: Discharge to home or other facility with appropriate resources  INTERVENTIONS:  - Identify barriers to discharge w/patient and caregiver  - Arrange for needed discharge resources and transportation as appropriate  - Identify discharge learning needs (meds, wound care, etc )  - Arrange for interpretive services to assist at discharge as needed  - Refer to Case Management Department for coordinating discharge planning if the patient needs post-hospital services based on physician/advanced practitioner order or complex needs related to functional status, cognitive ability, or social support system  Outcome: Progressing

## 2017-12-14 NOTE — TERTIARY TRAUMA SURVEY
Progress Note - Tertiary Trauma Survery   Cassius Barrera 80 y o  male MRN: [id number]  Unit/Bed#: [facility] 923-01 Encounter: [id number]    Summary of Diagnosed Injuries: 79 y/o male s/p fall    Clinical Plan:   L SAH/SDH- s/p DDAVP due to ASA use  Hold ASA  GCS 15  Neurosurgery consult pending  Continue q1h neuro checks  T9 fracture with widening of T8 to T9 disc space concerning for ligamentous injury- NSg obtaining MRI of T/L spine today for further evaluation and will likely require surgery tomorrow  Will consult cardiology for risk stratification  Continue thoracolumbar spine precautions and maintain C-collar for now  Chronic L1/L2 fractures with possible acute component- MRI lumbar spine per neurosurgery  Maintain spine precautions  RLE present which is mild and NSg aware  Continue neuro checks  Cervical spine pain- place in ASPEN collar and maintain Cspine precautions  Check CT scan of cervical spine  Left 9th rib fracture at costovertebral junction- analgesia,  IS/Pulmonary toilette  History of atrial fibrillation- holding ASA due to [address]  Rate controlled  On no beta blockers or CCBs as outpatient  Consult cardiology for pre-op risk stratification  History of BPH- resume home medications    Acute pain due to trauma- continue APAP, and oxycodone with dilaudid for breakthrough  Add Lidoderm patches and scheduled robaxin as well  Proph- SCDs, hold chemical prophylaxis due to [address] and     Disp- continue HOT protocol for now, low threshold to transfer to ICU if new neuro deficits develop or patient declines       Mechanism of Injury: Fall    Transfer from: [facility]  Outside Films Received: yes  Tertiary Exam Due on: 12/14/2017    Vitals: Blood pressure 130/80, pulse 64, temperature 98 7 °F (37 1 °C), temperature source Oral, resp  rate 20, height 6' 2" (1 88 m), weight 80 6 kg (177 lb 11 1 oz), SpO2 98 %  ,Body mass index is 22 81 kg/m²      CT / RADIOGRAPHS: ALL RESULTS MUST BE CONFIRMED BY FACULTY OR PRINTED REPORT    CT HEAD: Small foci of subdural and subarachnoid hemorrhage over the left parietal convexity, bilateral anterior-inferior frontal region and along the falx  No mass effect     CT CHEST:    CT FACE:  CT ABDOMEN / PELVIS:   CT CERVICAL SPINE:  XR PELVIS:    CT THORACIC / LUMBAR SPINE: 1  Distracted T9 fracture with widening of the anterior T8-9 disc space, compatible with disc and ligamentous injury  Widening of the left T8-9 facet suggesting capsular injury  2  Chronic L1 and L2 fractures with possible superimposed acute fractures  MRI may be helpful for further evaluation  3  Fracture of the left T9 rib with the costovertebral junction  CXR CHEST:    OTHER: OTHER:    OTHER:  OTHER:    OTHER: OTHER:    OTHER:  OTHER:    OTHER:  OTHER:      Consultants - List Service/ Faculty and Date: Neurosurgery    Active medications:           Current Facility-Administered Medications:     acetaminophen (TYLENOL) tablet 975 mg, 975 mg, Oral, Q8H [address], 975 mg at 12/14/17 0515    gabapentin (NEURONTIN) capsule 100 mg, 100 mg, Oral, HS, 100 mg at 12/13/17 2202    HYDROmorphone (DILAUDID) 1 mg/mL injection 0 2 mg, 0 2 mg, Intravenous, Q4H PRN, 0 2 mg at 12/14/17 9347    oxyCODONE (ROXICODONE) IR tablet 2 5 mg, 2 5 mg, Oral, Q4H PRN, 2 5 mg at 12/14/17 0515      Intake/Output Summary (Last 24 hours) at 12/14/17 5715  Last data filed at 12/14/17 1593   Gross per 24 hour   Intake              300 ml   Output              450 ml   Net             -150 ml       Invasive Devices     Peripheral Intravenous Line            Peripheral IV 12/13/17 Right Antecubital less than 1 day                CAGE-AID Questionnaire:    Was the patient able to participate in the CAGE-AID screening questions on admission? No:   1  Does the patient consume alcohol? a  NO-Patient does not consume alcohol     2  Does the patient use street drugs or abuse prescription drugs? a   NO-Patient does not use street drugs or abuse prescription drugs      Is the patient 72 years or older: YES:    1  Before the illness or injury that brought you to the Emergency, did you need someone to help you on a regular basis? 0=No   2  Since the illness or injury that brought you to the Emergency, have you needed more help than usual to take care of yourself? 1=Yes   3  Have you been hospitalized for one or more nights during the past 6 months (excluding a stay in the Emergency Department)? 0=No   4  In general, do you see well? 0=Yes   5  In general, do you have serious problems with your memory? 0=No   6  Do you take more than three different medications everyday? 1=Yes   TOTAL   2     Did you order a geriatric consult if the score was 2 or greater?: yes      1  GCS:  GCS Total:  15  2  Head:   WNL  3  Neck:   a  Inspect for lac/abrasion/hematoma/swelling:  None and b   Palpate for tenderness:  Present  4  Chest:   WNL  5  Abdomen/Pelvis:   WNL  6  Back (log roll with spinal immobilization unless cleared radiographically):   a  Inspect back of head/entire back for:   lac/abrasion:  None and b   Palpate for tenderness and deformity:  vertebrae (T-L-S spine):  Present: cervical through lumbar spine tenderness  7  Extremities:   Lacs, abrasions, swelling, ecchymosis: + RUE skin tear   Tenderness, pain with motor, instability: none  8  Peripheral Nerves: WNL    Do NOT use the following abbreviations: DTO, gr, Giselle, MS, MSO4, MgSO4, Nitro, QD, QID, QOD, u, , ?, ?g or trailing zeros   Always use a zero before a decimal     Labs:   CBC:   Lab Results   Component Value Date    WBC 8 19 12/14/2017    HGB 13 3 12/14/2017    HCT 39 8 12/14/2017    MCV 91 12/14/2017     (L) 12/14/2017    MCH 30 4 12/14/2017    MCHC 33 4 12/14/2017    RDW 14 2 12/14/2017    MPV 10 7 12/14/2017    NRBC 0 12/14/2017     CMP:   Lab Results   Component Value Date     12/14/2017     12/14/2017    CO2 26 12/14/2017    ANIONGAP 7 12/14/2017    BUN 21 12/14/2017 CREATININE 1 15 12/14/2017    GLUCOSE 135 12/14/2017    CALCIUM 8 2 (L) 12/14/2017    AST 12 12/14/2017    ALT 13 12/14/2017    ALKPHOS 51 12/14/2017    PROT 6 8 12/14/2017    ALBUMIN 3 2 (L) 12/14/2017    BILITOT 1 25 (H) 12/14/2017    EGFR 57 12/14/2017

## 2017-12-14 NOTE — H&P
H&P Exam - Trauma   Nirmala Sosa 80 y o  male MRN: [id number]  Unit/Bed#: ED 03 Encounter: [id number]    Assessment/Plan   Trauma Alert: Evaluation  Model of Arrival: Ambulance  Trauma Team: Attending Ness Tomas and Residents Marya  Consultants: Neurosurgery: T spine and SAH  Time Called 9:30    Trauma Active Problems:  -SAH L  -T9 vertebral body fracture  -Chronic L1 fracture  -Chronic L2 fracture  -T9 rib fracture at the costovertebral junction    Trauma Plan:   -Neurosurgical consult for his upper rectal and hemorrhage as well as vertebral body fractures  -DDAVP  -Hot protocol   -Rib fx protocol   -PT/Ot      Chief Complaint:     History of Present Illness   HPI:  Nirmala Sosa is a 80 y o  male who presents to the trauma service status post fall  Patient states he fell while getting out of a recliner  Patient states he fell into a heater that is next to his chair  Patient was taken to the [facility] or use found to have fever body fractures in addition to a subarachnoid hemorrhage  Patient takes aspirin daily  Mechanism:Fall    Review of Systems   Constitutional: Negative for activity change, appetite change, chills, fatigue and fever  HENT: Negative  Eyes: Negative  Respiratory: Negative  Cardiovascular: Negative for chest pain  Gastrointestinal: Negative for abdominal distention, abdominal pain, blood in stool, constipation, diarrhea, nausea and vomiting  Endocrine: Negative  Genitourinary: Negative for decreased urine volume, difficulty urinating, dysuria, enuresis, flank pain, frequency, hematuria, penile swelling, scrotal swelling, testicular pain and urgency  Musculoskeletal: Positive for back pain  Skin: Negative  Allergic/Immunologic: Negative  Neurological: Positive for headaches  Hematological: Negative  Psychiatric/Behavioral: Negative  All other systems reviewed and are negative        Historical Information     Past Medical History: Diagnosis Date    Actinic keratosis     Anemia     chronic hemolytic anemia    Atrial fibrillation (HCC)     Benign prostatic hypertrophy     Blood in urine     Cellulitis     Dermatitis     Disease of thyroid gland     Dyslipidemia     Epidural hematoma (HCC)     Epistaxis     Falling     Folate deficiency     GERD (gastroesophageal reflux disease)     Hip fracture (HCC)     Hyperlipidemia     Hypertension     Hypothyroidism (acquired)     Insomnia     Neuropathy     Nocturia     Osteoarthritis     Psychiatric disorder     depression    Renal disorder     Acute renal failure    Sepsis (HCC)     Shoulder pain, right     Squamous cell carcinoma     Subdural hematoma (HCC)     Thrombophlebitis     Urinary retention      Past Surgical History:   Procedure Laterality Date    HIP SURGERY       Social History   History   Alcohol Use    Yes     Comment: once a week     History   Drug Use No     History   Smoking Status    Former Smoker    Quit date: 2/4/1976   Smokeless Tobacco    Never Used       There is no immunization history on file for this patient  Last Tetanus: unknown  Family History: Non-contributory      Meds/Allergies   all current active meds have been reviewed    No Known Allergies      PHYSICAL EXAM      Objective   Vitals:   First set: Temperature: 98 °F (36 7 °C) (12/13/17 2000)  Pulse: 83 (12/13/17 2000)  Respirations: 18 (12/13/17 2000)  Blood Pressure: 170/93 (12/13/17 2000)    Primary Survey:   (A) Airway: Patent   (B) Breathing: B/L breathe sounds   (C) Circulation: Pulses:   normal  (D) Disabliity:  GCS Total:  15  (E) Expose:  Completed    Secondary Survey: (Click on Physical Exam tab above)  Physical Exam   Constitutional: He is oriented to person, place, and time  He appears well-developed and well-nourished  No distress  HENT:   Head: Head is without raccoon's eyes, without Murillo's sign and without abrasion         Right Ear: External ear normal    Left Ear: External ear normal    Nose: Nose normal    Mouth/Throat: Oropharynx is clear and moist  No oropharyngeal exudate  Eyes: Conjunctivae and EOM are normal  Pupils are equal, round, and reactive to light  Right eye exhibits no discharge  Left eye exhibits no discharge  No scleral icterus  Fundoscopic exam:       The right eye shows no hemorrhage and no papilledema  The left eye shows no hemorrhage and no papilledema  Slit lamp exam:       The right eye shows no hyphema  The left eye shows no hyphema  Neck: Normal range of motion  Neck supple  No JVD present  No tracheal deviation present  No thyromegaly present  Cardiovascular: Normal rate, regular rhythm, normal heart sounds and intact distal pulses  Exam reveals no gallop and no friction rub  No murmur heard  Pulmonary/Chest: Effort normal and breath sounds normal  No stridor  No respiratory distress  He has no wheezes  He has no rales  He exhibits tenderness  Abdominal: Soft  Bowel sounds are normal  He exhibits no distension and no mass  There is no tenderness  There is no rebound and no guarding  Musculoskeletal: He exhibits tenderness  He exhibits no edema or deformity  Cervical back: Normal  He exhibits normal range of motion, no tenderness, no bony tenderness and no pain  Thoracic back: He exhibits normal range of motion, no tenderness, no bony tenderness, no swelling and no deformity  Lumbar back: He exhibits normal range of motion, no tenderness, no bony tenderness, no swelling, no edema, no pain and no spasm  Lymphadenopathy:     He has no cervical adenopathy  Neurological: He is alert and oriented to person, place, and time  He has normal reflexes  He displays normal reflexes  No cranial nerve deficit  He exhibits normal muscle tone  Coordination normal    Skin: Skin is warm  No rash noted  He is not diaphoretic  No erythema  No pallor  Psychiatric: He has a normal mood and affect   His behavior is normal  Judgment and thought content normal    Nursing note reviewed  Invasive Devices     Peripheral Intravenous Line            Peripheral IV 12/13/17 Right Antecubital less than 1 day                Lab Results: Results: I have personally reviewed pertinent reports  Imaging/EKG Studies: Results: I have personally reviewed pertinent reports      Other Studies:    Code Status: Prior  Advance Directive and Living Will:      Power of :    POLST:

## 2017-12-14 NOTE — CONSULTS
Consultation - Neurosurgery   Yue Mcfadden 80 y o  male MRN: [id number]  Unit/Bed#: [facility] 923-01 Encounter: [id number]      Assessment/Plan     Assessment:  1  Acute traumatic bifrontal subdural hematoma- stable  2  Acute traumatic left posterior parietal subarachnoid hemorrhage- stable  3  T9 Chance fracture with associated T8-T9 disc space widening concerning for associated ligamentous injury  4  L1-L2 fractures acute on chronic  5  Ambulatory dysfunction  6  History of prior traumatic intracranial hemorrhage  7  A  Fib on aspirin    Plan:  · Neuro exam: GCS15, AAOx3, follows commands  Diffuse tenderness to palpation throughout mid thoracic and lumbar spine  · Imaging reviewed personally with attending  · CT head:  Small foci of subdural and subarachnoid hemorrhage over the left parietal convexity  Bilateral anterior inferior frontal subdural hematoma along the falx  No mass effect  · Repeat CT head: Bifrontal subdural hematomas, parafalcine subdural hematomas, left posterior parietal subdural and subarachnoid hematomas all appear stable  There is also a right temporal lobe small subdural hematoma without significant mass effect or midline shift that is likely stable in retrospect  Cortical atrophy with microangiopathic changes noted as before  · CT cervical spine: no acute fracture or malalignment  · CT thoracolumbar:  Distracted T9 fracture with finding of anterior T8-T9 disc space compatible with disc and ligamentous injury  Widening of left T8-T9 facet suggesting capsular injury  Chronic L1 and L2 compression fractures with super imposed acute fractures  · Continue management by primary team, trauma  · Orthopedics consulted for T9, L1, L2 fracture the patient was previously established with Dr Ranjeet Peter  · Pain management per primary team, recommend referencing geriatric pain order set  · Continue to hold antiplatelet anticoagulation agents  Previously on aspirin 81 mg prior to admission  Anticoagulation  Previously due to repetitive falls  DDAVP given for reversal  May consider HSQ for DVT ppx tomorrow if neuro exam stable  · SCDs for DVT prophylaxis  · Mobilization pending orthopedics evaluation  · May consider initiation of  Keppra 500 mg b i d  for seizure prophylaxis  · Neurosurgery will follow-up in 2 weeks with repeat CT head at that time  Call sooner if questions, concerns, exam change  History of Present Illness     HPI: Sebastian Bangura is a 80y o  year old male, PMH ambulattory dysfunction, a  Fib, history of "brain bleed" and back fracture, most significant among many other comorbidities, who presented s/p fall out of chair found to have SDH, SAH, and T9, L1, L2 fractures  Patient lives at an assisted living where he ambulates with a RW  Daughter states that he sustains a fall approximately every 6 months  He has a history of hip fx and L1 L2 fractures  He is established with Dr Paul moreira who previously evaluated him for known L1 and L2 fractures which have previously been treated with a TLSO in 2016  Patient fell over a rug (earlier in the week )in his room and subsequently called for help  Patient was without deficits thereafter and was at baseline  Today pateint fell into the heater, perhaps while trying to get in or out of a chair  Thereafter he called for help, was assisted to stand  Denies LOC  Patient was taken to the ED for evaluation at which time [facility], SDH and T9 fractures were identified  Per daughter patient had a history of a brain bleed several years ago  After repetative falls took place patient's cardiologist took him off coumadin  He still remains on ASA  Patient currently complaints of globalized headache, generalized pain and weakness  However, pain is most prominent in his back  Unable to further specify  He denies dizziness, vision changes, nausea, confusion, numbness, radiculopathy, confusion   He denies urinary retention or incontinence  Has had 2 episodes of urination in bed secondary to lack of ability to get OOB without assistance  He desires to walk  Patient admits to improvement of pain with medications  Inpatient consult to Neurosurgery  Consult performed by: Abdirahman Morales  [facility]          Review of Systems   HENT: Negative for trouble swallowing  Eyes: Negative for visual disturbance  Respiratory: Negative for chest tightness and shortness of breath  Gastrointestinal: Negative for abdominal pain, nausea and vomiting  Genitourinary: Negative for difficulty urinating  Musculoskeletal: Positive for arthralgias, back pain, myalgias and neck pain  Skin: Positive for color change  Neurological: Positive for syncope, weakness and headaches  Negative for dizziness and numbness  Psychiatric/Behavioral: Negative for confusion         Historical Information   Past Medical History:   Diagnosis Date    Actinic keratosis     Anemia     chronic hemolytic anemia    Atrial fibrillation (HCC)     Benign prostatic hypertrophy     Blood in urine     Cellulitis     Dermatitis     Disease of thyroid gland     Dyslipidemia     Epidural hematoma (HCC)     Epistaxis     Falling     Folate deficiency     GERD (gastroesophageal reflux disease)     Hip fracture (HCC)     Hyperlipidemia     Hypertension     Hypothyroidism (acquired)     Insomnia     Neuropathy     Nocturia     Osteoarthritis     Psychiatric disorder     depression    Renal disorder     Acute renal failure    Sepsis (HCC)     Shoulder pain, right     Squamous cell carcinoma     Subdural hematoma (HCC)     Thrombophlebitis     Urinary retention      Past Surgical History:   Procedure Laterality Date    HIP SURGERY       History   Alcohol Use    Yes     Comment: once a week     History   Drug Use No     History   Smoking Status    Former Smoker    Quit date: 2/4/1976   Smokeless Tobacco    Never Used History reviewed  No pertinent family history  Meds/Allergies   all current active meds have been reviewed, current meds:   Current Facility-Administered Medications   Medication Dose Route Frequency    acetaminophen (TYLENOL) tablet 975 mg  975 mg Oral Q8H [address]    finasteride (PROSCAR) tablet 5 mg  5 mg Oral Daily    furosemide (LASIX) tablet 10 mg  10 mg Oral Daily    gabapentin (NEURONTIN) capsule 100 mg  100 mg Oral HS    HYDROmorphone (DILAUDID) 1 mg/mL injection 0 2 mg  0 2 mg Intravenous Q4H PRN    lidocaine (LIDODERM) 5 % patch 2 patch  2 patch Transdermal Daily    methocarbamol (ROBAXIN) tablet 500 mg  500 mg Oral Q8H [address]    oxyCODONE (ROXICODONE) IR tablet 2 5 mg  2 5 mg Oral Q4H PRN    tamsulosin (FLOMAX) capsule 0 4 mg  0 4 mg Oral Daily With Dinner    and PTA meds:   Prior to Admission Medications   Prescriptions Last Dose Informant Patient Reported? Taking? Cholecalciferol (VITAMIN D3) 2000 UNITS TABS   Yes Yes   Sig: Take 2,000 Units by mouth daily  Tamsulosin HCl (FLOMAX PO)   Yes Yes   Sig: Take 0 4 mg by mouth daily at bedtime  acetaminophen (TYLENOL) 325 mg tablet   Yes Yes   Sig: Take 650 mg by mouth 2 (two) times a day  aspirin 81 mg chewable tablet   Yes Yes   Sig: Chew 81 mg daily  cyanocobalamin (VITAMIN B-12) 1,000 mcg tablet   Yes Yes   Sig: Take 1,000 mcg by mouth daily  finasteride (PROSCAR) 5 mg tablet   Yes Yes   Sig: Take 5 mg by mouth daily  furosemide (LASIX) 20 mg tablet   Yes Yes   Sig: Take 20 mg by mouth every morning Indications: take 1/2 tab    magnesium hydroxide (MILK OF MAGNESIA) 400 mg/5 mL oral suspension   Yes Yes   Sig: Take 30 mL by mouth daily as needed for constipation   sodium chloride (OCEAN) 0 65 % nasal spray   Yes Yes   Si sprays into each nostril 3 (three) times a day        Facility-Administered Medications: None     No Known Allergies    Objective     Intake/Output Summary (Last 24 hours) at 17  Last data filed at 12/14/17 1500   Gross per 24 hour   Intake              660 ml   Output              900 ml   Net             -240 ml       Physical Exam   HENT:   Facial ecchymosis   Eyes: Conjunctivae and EOM are normal  Pupils are equal, round, and reactive to light  Neck:   VISTA collar in place  Non-tender to posterior palpation   Cardiovascular: Normal heart sounds  Pulmonary/Chest: Effort normal    Abdominal: Soft  He exhibits distension  Musculoskeletal:   Diffuse TTP throughout mid thoracic and lumbar spine   Neurological: Finger-nose-finger test: dysmetria b/l  limited by patient's weakness/fatigue  Reflex Scores:       Bicep reflexes are 2+ on the right side and 2+ on the left side  Brachioradialis reflexes are 2+ on the right side and 2+ on the left side  Patellar reflexes are 2+ on the right side and 2+ on the left side  Skin:   Multiple regions of ecchymosis primarily on face and UE   Psychiatric: His speech is normal    Anxious to get out of bed and agitated that he has had two episodes of urination in      Neurologic Exam     Mental Status   Oriented to person  Oriented to place  Oriented to time  Follows 3 step commands  Attention: normal  Concentration: normal    Speech: speech is normal   Level of consciousness: alert  Able to perform simple calculations  Able to name object  Able to repeat  Normal comprehension  Conversational, pleasant     Cranial Nerves   Cranial nerves II through XII intact  CN III, IV, VI   Pupils are equal, round, and reactive to light    Extraocular motions are normal      Motor Exam B/l UE  5/5   5-/5 WF/WE  4+/5 biceps  4/5 triceps  3/5 abduction on left (limited by existing shoulder injury)  4/5 right abduction  4/5 adduction bilaterally     Sensory Exam   Light touch normal    Right leg proprioception: normal  Left leg proprioception: normal  Right arm pinprick: normal  Left arm pinprick: normal  Right leg pinprick: normal  Left leg pinprick: normal    Gait, Coordination, and Reflexes     Coordination   Finger-nose-finger test: dysmetria b/l  limited by patient's weakness/fatigue  Reflexes   Right brachioradialis: 2+  Left brachioradialis: 2+  Right biceps: 2+  Left biceps: 2+  Right patellar: 2+  Left patellar: 2+  Right Carcamo: absent  Left Carcamo: absent  Right ankle clonus: absent  Left ankle clonus: absent      Vitals:Blood pressure 135/79, pulse 58, temperature 98 °F (36 7 °C), temperature source Oral, resp  rate 16, height 6' 2" (1 88 m), weight 80 6 kg (177 lb 11 1 oz), SpO2 97 %  ,Body mass index is 22 81 kg/m²  Lab Results:   I have personally reviewed pertinent results  Lab Results   Component Value Date    WBC 8 19 12/14/2017    HGB 13 3 12/14/2017    HCT 39 8 12/14/2017    MCV 91 12/14/2017     (L) 12/14/2017    MCH 30 4 12/14/2017    MCHC 33 4 12/14/2017    RDW 14 2 12/14/2017    MPV 10 7 12/14/2017    NRBC 0 12/14/2017     12/14/2017     12/14/2017    CO2 26 12/14/2017    ANIONGAP 7 12/14/2017    BUN 21 12/14/2017    CREATININE 1 15 12/14/2017    GLUCOSE 135 12/14/2017    CALCIUM 8 2 (L) 12/14/2017    AST 12 12/14/2017    ALT 13 12/14/2017    ALKPHOS 51 12/14/2017    PROT 6 8 12/14/2017    ALBUMIN 3 2 (L) 12/14/2017    BILITOT 1 25 (H) 12/14/2017    EGFR 57 12/14/2017    INR 1 25 (H) 12/14/2017       Imaging Studies: I have personally reviewed pertinent reports  EKG, Pathology, and Other Studies: I have personally reviewed pertinent reports        VTE Prophylaxis: Sequential compression device Mindy Gonzalez)     Code Status: Level 1 - Full Code  Advance Directive and Living Will:      Power of :    POLST:

## 2017-12-14 NOTE — CONSULTS
Consultation - Cardiology Team One  Hortensia Rico 80 y o  male MRN: [id number]  Unit/Bed#: [facility] 923-01 Encounter: [id number]    Inpatient consult to Cardiology  Consult performed by: Romel Castaneda ordered by: Willis Pal      Physician Requesting Consult: Clayton Lopez MD  Reason for Consult / Principal Problem: Pre operative risk assessment     History of Present Illness   HPI: Hortensia Rico is a 80y o  year old male who has a history of ambulatory dysfunction s/p multiple mechanical falls, hx of SDH, paroxysmal atrial fibrillation and hypertension  He follows with cardiologist Dr Kayy Whitley  He presents originally to T s/p mechanical fall at nursing facility  Patient reports he was ambulating with his walker, turned around to someone calling him and lost his balance  He denies dizziness, lightheaded or palpitations prior or after event  He reports no loss of conscious  He denies history of syncope or near syncope  On arrival to ED, he was found to have T9 spinal fracture and SAH and transferred to [facility] for further management  Neurosurgery and trauma surgery following  Plan is for tentative OR for T 9 fracture  Cardiology consulted for pre operative risk assessment  Patient has history of atrial fibrillation  He is not on [address] [place] [address] Road due to history of mechanical falls with SDH  He does take aspirin daily  He was given DDAVP on admission  ECG reviewed showing atrial fibrillation with slow ventricular rate 58 bpm  Last cardiology office note reviewed showing he was on metoprolol 12 5 mg PO BID  He reports he is fairly active at the nursing facility  He participates in activities and ambulates with walker  He denies chest pain or SOB at rest or with exertion  He has no history of known CAD or heart failure  Last echocardiogram 10/28/16 showed EF 60%, no regional wall motion abnormalities and  Mild to moderate mitral regurgitation        Review of Systems   Constitution: Negative for chills and fever  Cardiovascular: Negative for chest pain, dyspnea on exertion, leg swelling, near-syncope and palpitations  Respiratory: Negative for shortness of breath  Musculoskeletal: Positive for falls  Neurological: Negative for dizziness and light-headedness  Psychiatric/Behavioral: Negative for altered mental status  Historical Information   Past Medical History:   Diagnosis Date    Actinic keratosis     Anemia     chronic hemolytic anemia    Atrial fibrillation (HCC)     Benign prostatic hypertrophy     Blood in urine     Cellulitis     Dermatitis     Disease of thyroid gland     Dyslipidemia     Epidural hematoma (HCC)     Epistaxis     Falling     Folate deficiency     GERD (gastroesophageal reflux disease)     Hip fracture (HCC)     Hyperlipidemia     Hypertension     Hypothyroidism (acquired)     Insomnia     Neuropathy     Nocturia     Osteoarthritis     Psychiatric disorder     depression    Renal disorder     Acute renal failure    Sepsis (HCC)     Shoulder pain, right     Squamous cell carcinoma     Subdural hematoma (HCC)     Thrombophlebitis     Urinary retention      Past Surgical History:   Procedure Laterality Date    HIP SURGERY       History   Alcohol Use    Yes     Comment: once a week     History   Drug Use No     History   Smoking Status    Former Smoker    Quit date: 2/4/1976   Smokeless Tobacco    Never Used     Family History: History reviewed  No pertinent family history      Meds/Allergies   all current active meds have been reviewed and current meds:   Current Facility-Administered Medications   Medication Dose Route Frequency    acetaminophen (TYLENOL) tablet 975 mg  975 mg Oral Q8H [address]    finasteride (PROSCAR) tablet 5 mg  5 mg Oral Daily    furosemide (LASIX) tablet 10 mg  10 mg Oral Daily    gabapentin (NEURONTIN) capsule 100 mg  100 mg Oral HS    HYDROmorphone (DILAUDID) 1 mg/mL injection 0 2 mg  0 2 mg Intravenous Q4H PRN    lidocaine (LIDODERM) 5 % patch 2 patch  2 patch Transdermal Daily    methocarbamol (ROBAXIN) tablet 500 mg  500 mg Oral Q8H [address]    oxyCODONE (ROXICODONE) IR tablet 2 5 mg  2 5 mg Oral Q4H PRN    tamsulosin (FLOMAX) capsule 0 4 mg  0 4 mg Oral Daily With Dinner          No Known Allergies    Objective   Vitals: Blood pressure 163/82, pulse 62, temperature 98 °F (36 7 °C), temperature source Oral, resp  rate 18, height 6' 2" (1 88 m), weight 80 6 kg (177 lb 11 1 oz), SpO2 97 %  ,     Body mass index is 22 81 kg/m²  ,     Systolic (61LQE), UUW:176 , Min:126 , IKD:079     Diastolic (90RUN), YRZ:51, Min:69, Max:95        Intake/Output Summary (Last 24 hours) at 12/14/17 1026  Last data filed at 12/14/17 0628   Gross per 24 hour   Intake              300 ml   Output              450 ml   Net             -150 ml     Weight (last 2 days)     Date/Time   Weight    12/14/17 0010  80 6 (177 69)    12/13/17 2000  80 7 (178)            Invasive Devices     Peripheral Intravenous Line            Peripheral IV 12/13/17 Right Antecubital less than 1 day              Physical Exam   Constitutional: He is oriented to person, place, and time  No distress  Neck:   Cervical collar    Cardiovascular: Normal rate and intact distal pulses  Murmur heard  Irregular   Grade II systolic murmur    Pulmonary/Chest: Effort normal and breath sounds normal  No respiratory distress  He has no wheezes  RA  No crackles    Abdominal: Soft  Bowel sounds are normal    Musculoskeletal: He exhibits no edema  Neurological: He is alert and oriented to person, place, and time  Skin: Skin is warm and dry  He is not diaphoretic  Psychiatric: He has a normal mood and affect   His behavior is normal      LABORATORY RESULTS:      CBC with diff:   Results from last 7 days  Lab Units 12/14/17  0554 12/13/17  1851   WBC Thousand/uL 8 19 9 29   HEMOGLOBIN g/dL 13 3 12 8   HEMATOCRIT % 39 8 38 7   MCV fL 91 92   PLATELETS Thousands/uL 119* 110* MCH pg 30 4 30 5   MCHC g/dL 33 4 33 1   RDW % 14 2 14 0   MPV fL 10 7 10 4   NRBC AUTO /100 WBCs 0  --        CMP:  Results from last 7 days  Lab Units 17  0625 17  1851   SODIUM mmol/L 140 137   POTASSIUM mmol/L 3 9 4 7   CHLORIDE mmol/L 107 106   CO2 mmol/L 26 23   ANION GAP mmol/L 7 8   BUN mg/dL 21 24   CREATININE mg/dL 1 15 1 32*   GLUCOSE RANDOM mg/dL 135 125   CALCIUM mg/dL 8 2* 8 2*   AST U/L 12 29   ALT U/L 13 14   ALK PHOS U/L 51 53   TOTAL PROTEIN g/dL 6 8 6 5   ALBUMIN g/dL 3 2* 3 1*   BILIRUBIN TOTAL mg/dL 1 25* 1 50*   EGFR ml/min/1 73sq m 57 48       BMP:  Results from last 7 days  Lab Units 17  0625 17  1851   SODIUM mmol/L 140 137   POTASSIUM mmol/L 3 9 4 7   CHLORIDE mmol/L 107 106   CO2 mmol/L 26 23   BUN mg/dL 21 24   CREATININE mg/dL 1 15 1 32*   GLUCOSE RANDOM mg/dL 135 125   CALCIUM mg/dL 8 2* 8 2*              Results from last 7 days  Lab Units 17  0147 17  1851   INR  1 25* 1 15     Lipid Profile:   No results found for: CHOL  No results found for: HDL  No results found for: LDLCALC  No results found for: TRIG      Cardiac testing:   Results for orders placed during the hospital encounter of 10/28/16   Echo complete with contrast if indicated    [address]  [address] [place] [address]  [phone]    Transthoracic Echocardiogram  2D, M-mode, Doppler, and Color Doppler    Study date:  28-Oct-2016    Patient: Salvatore Marquez  MR number: [id number]  Account number: [de-identified]  : [id number]  Age: 80 years  Gender: Male  Status: Outpatient  [facility]: [address] [place] [address] [facility]  Height: 72 in  Weight: 198 lb  BP: 111/ 65 mmHg    Indications: Atrial fibrillation    Diagnoses: I48 0 - Atrial fibrillation    Sonographer:  Colt Sargent  Primary Physician:  Louise Harris MD  Referring Physician:  Jaime Flaherty DO  Group:  [facility]  Interpreting Physician:  Kristina Gary Esdras Tavera MD    SUMMARY    PROCEDURE INFORMATION:  This was a technically difficult study  LEFT VENTRICLE:  Systolic function was normal  Ejection fraction was estimated to be 60 %  There were no regional wall motion abnormalities  Wall thickness was mildly increased  LEFT ATRIUM:  The atrium was moderately to markedly dilated  MITRAL VALVE:  There was mild annular calcification  There was mild to moderate regurgitation  The regurgitant jet was centrally directed  AORTIC VALVE:  The valve was trileaflet  Leaflets exhibited mildly to moderately increased  thickness, mild to moderate calcification, and mild to moderately reduced  cuspal separation  There was mild to moderate stenosis  TRICUSPID VALVE:  There was trace regurgitation  Pulmonary artery systolic pressure was at the upper limits of normal     PULMONIC VALVE:  There was trace regurgitation  HISTORY: PRIOR HISTORY: Hypertension, hyperlipidemia, atrial fibrillation,  former smoker, edema, hypothyroidism    PROCEDURE: The study was performed in the [address] [facility] [place]  This was a routine study  The transthoracic approach was used  The study  included complete 2D imaging, M-mode, complete spectral Doppler, and color  Doppler  This was a technically difficult study  LEFT VENTRICLE: Size was normal  Systolic function was normal  Ejection  fraction was estimated to be 60 %  There were no regional wall motion  abnormalities  Wall thickness was mildly increased  No evidence of apical  thrombus  DOPPLER: Left ventricular diastolic function parameters were normal     RIGHT VENTRICLE: The size was normal  Systolic function was normal  Wall  thickness was normal     LEFT ATRIUM: The atrium was moderately to markedly dilated  RIGHT ATRIUM: Size was normal     MITRAL VALVE: There was mild annular calcification  Valve structure was normal   There was mild thickening  There was normal leaflet separation   DOPPLER: The  transmitral velocity was within the normal range  There was no evidence for  stenosis  There was mild to moderate regurgitation  The regurgitant jet was  centrally directed  AORTIC VALVE: The valve was trileaflet  Leaflets exhibited mildly to moderately  increased thickness, mild to moderate calcification, and mild to moderately  reduced cuspal separation  DOPPLER: Transaortic velocity was increased due to  valvular stenosis  There was mild to moderate stenosis  There was no  significant regurgitation  TRICUSPID VALVE: The valve structure was normal  There was normal leaflet  separation  DOPPLER: The transtricuspid velocity was within the normal range  There was no evidence for stenosis  There was trace regurgitation  Pulmonary  artery systolic pressure was at the upper limits of normal     PULMONIC VALVE: Leaflets exhibited normal thickness, no calcification, and  normal cuspal separation  DOPPLER: The transpulmonic velocity was within the  normal range  There was trace regurgitation  PERICARDIUM: There was no pericardial effusion  The pericardium was normal in  appearance  AORTA: The root exhibited upper limit of normal size  3 6 cm    SYSTEMIC VEINS: IVC: The inferior vena cava was normal in size  SYSTEM MEASUREMENT TABLES    2D  %FS: 28 1 %  Ao Diam: 3 64 cm  EDV(Teich): 101 71 ml  EF(Cube): 62 83 %  EF(Teich): 54 34 %  ESV(Cube): 38 28 ml  ESV(Teich): 46 44 ml  IVSd: 1 13 cm  LA Area: 20 86 cm2  LA Diam: 5 05 cm  LVEDV MOD A4C: 151 2 ml  LVEF MOD A4C: 66 78 %  LVESV MOD A4C: 50 23 ml  LVIDd: 4 69 cm  LVIDs: 3 37 cm  LVLd A4C: 8 25 cm  LVLs A4C: 6 62 cm  LVOT Diam: 2 15 cm  LVPWd: 1 15 cm  RA Area: 18 51 cm2  RV Diam : 4 26 cm  SV MOD A4C: 100 97 ml  SV(Cube): 64 7 ml  SV(Teich): 55 27 ml    CW  AV Env  Ti: 348 74 ms  AV VTI: 69 24 cm  AV Vmax: 2 96 m/s  AV Vmean: 1 99 m/s  AV maxP 13 mmHg  AV meanP 04 mmHg  TR Vmax: 2 7 m/s  TR maxP 07 mmHg    MM  TAPSE: 2 5 cm    PW  BHARATHI (VTI): 1 16 cm2  BHARATHI Vmax: 1 38 cm2  E': 0 09 m/s  E/E': 11 27  LVOT Env  Ti: 347 5 ms  LVOT VTI: 22 16 cm  LVOT Vmax: 1 12 m/s  LVOT Vmean: 0 64 m/s  LVOT maxP 05 mmHg  LVOT meanP 07 mmHg  LVSV Dopp: 80 45 ml  MV A Kash: 0 68 m/s  MV Dec [place]: 3 77 m/s2  MV DecT: 265 47 ms  MV E Kash: 1 m/s  MV E/A Ratio: 1 46    [employer] Accredited Echocardiography Laboratory    Prepared and electronically signed by    Mina Long MD  Signed 28-Oct-2016 12:11:42       No results found for this or any previous visit  No procedure found  No results found for this or any previous visit  Imaging: I have personally reviewed pertinent reports  Ct Head Without Contrast    Result Date: 2017  Narrative: CT BRAIN - WITHOUT CONTRAST INDICATION:  Headache and head trauma  COMPARISON:  2017  TECHNIQUE:  CT examination of the brain was performed  In addition to axial images, coronal reformatted images were created and submitted for interpretation  Radiation dose length product (DLP) for this visit:  1136 mGy-cm   This examination, like all CT scans performed in the [facility], was performed utilizing techniques to minimize radiation dose exposure, including the use of iterative reconstruction and automated exposure control  IMAGE QUALITY:  Diagnostic  FINDINGS:  PARENCHYMA:  Punctate focus of acute subarachnoid hemorrhage over the left parietal convexity  Possible tiny foci of subdural hemorrhage along the posterior falx measuring up to 3 mm in thickness  Minimal subdural hemorrhage along the anterior inferior aspect of the falx measuring up to 3 mm  Adjacent bilateral anterior inferior frontal subdural hemorrhage, also measuring up to 3 mm in thickness  Few punctate foci of acute subdural hemorrhage over the anterior inferior frontal region bilaterally  Periventricular and subcortical white matter lesions, consistent with microangiopathic disease   No intracranial mass, mass effect or midline shift  No CT signs of acute infarction  VENTRICLES AND EXTRA-AXIAL SPACES:  Normal for patient's age  VISUALIZED ORBITS AND PARANASAL SINUSES:  Unremarkable  CALVARIUM AND EXTRACRANIAL SOFT TISSUES:  No evidence of acute calvarial fracture or soft tissue hematoma        Impression: Small foci of subdural and subarachnoid hemorrhage over the left parietal convexity, bilateral anterior-inferior frontal region and along the falx  No mass effect  Workstation performed: [id number]     Ct Spine Thoracic & Lumbar Wo Contrast    Result Date: 12/13/2017  Narrative: CT THORACIC AND LUMBAR SPINE INDICATION:  Back pain and trauma  COMPARISON:  6/16/2016 TECHNIQUE:  Contiguous axial images were obtained  Sagittal and coronal reconstructions were performed  Radiation dose length product (DLP) for this visit:  070 [id number] 2691 mGy-cm   This examination, like all CT scans performed in the [facility], was performed utilizing techniques to minimize radiation dose exposure, including the use of iterative reconstruction and automated exposure control  IMAGE QUALITY:  Diagnostic  FINDINGS: ALIGNMENT:  Stable thoracic dextroscoliosis and lower lumbar levoscoliosis  VERTEBRAL BODIES: There is widening of the anterior T8-9 disc space measuring 17 mm  There is an oblique fracture through the T9 vertebral body  Posterior elements are grossly intact  There is mild widening of the left T8-9 facet suggesting capsular injury  No subluxation  There is a chronic L1 fracture  Anterolisthesis seen within the vertebral body may represent a partially healed fracture versus superimposed acute fracture  There is a chronic L2 fracture, also with subtle linear lucency, concerning for superimposed acute fracture  Fracture of the left T9 rib at the costovertebral junction   DEGENERATIVE CHANGES:  There is severe loss of disc height, endplate sclerosis and posterior disc osteophyte complexes in the lower thoracic and lumbar spine  Mild anterolisthesis of L4 is likely secondary to chronic disc and facet degenerative change severe bilateral bony neural foraminal narrowing at L4-5  Multilevel mild to moderate central canal stenosis in the lower thoracic and lumbar spine  PARASPINAL SOFT TISSUES:      No paraspinal hematoma  Trace amount of pleural fluid bilaterally  Impression: 1  Distracted T9 fracture with widening of the anterior T8-9 disc space, compatible with disc and ligamentous injury  Widening of the left T8-9 facet suggesting capsular injury  2  Chronic L1 and L2 fractures with possible superimposed acute fractures  MRI may be helpful for further evaluation  3  Fracture of the left T9 rib with the costovertebral junction  Workstation performed: [id number]       EKG reviewed personally:  Atrial fibrillation  Ventricular rate 58 bpm  QRSD interval 90 ms  QT interval 416 ms  QTc interval 408 ms    Telemetry reviewed personally:   No telemetry     Assessment    1  Pre operative risk assessment for #2  2  T9 fracture with widening of T8 to T9 disc concerning for ligamentous injury- followed by trauma surgery  3  L SAH/SDH- s/p DDAVP  Holding home medication: aspirin  Neurosurgery consulted and pending  4  Atrial fibrillation  5  Mechanical falls      Plan    Patient s/p mechanical fall  He has history of multiple mechanical falls  He denies palpitations, dizziness, lightheaded or chest pain prior or after the event  Reviewed ECG showing Atrial fibrillation with slow ventricular rate HR 58 bpm    Patient ambulates with a walker and is fairly active at the nursing facility  He denies chest pain or SOB with exertion  He has no history of known CAD or heart failure  Last echocardiogram 10/28/16 showed EF 60%, no regional wall motion abnormalities and  Mild to moderate mitral regurgitation  He follows with Dr Mike Strickland in the office  Last office visit note reviewed from 8/2017   Patient was on metoprolol 12 5 mg PO BID at that time  Would recommend restart prior to surgery  Monitor HR due to history of slow ventricular rate  Patient has no history of near syncope or syncope  From a cardiac standpoint, patient at acceptable risk to proceed as scheduled for OR for T9 fracture with widening of T8 to T9 disc concerning for ligamentous injury    Thank you for allowing us to participate in this patient's care  This pt will follow up with Dr Marylen Duval once discharged  Counseling / Coordination of Care  Total floor / unit time spent today 45 minutes  Greater than 50% of total time was spent with the patient and / or family counseling and / or coordination of care  A description of the counseling / coordination of care: Review of history, current assessment, development of a plan  Code Status: Level 1 - Full Code    ** Please Note: Dragon 360 Dictation voice to text software may have been used in the creation of this document   **

## 2017-12-14 NOTE — CASE MANAGEMENT
Initial Clinical Review    Admission: Date/Time/Statement:   12/13/17 AT 2127 EMERGENT INPATIENT TRANSFER FROM [facility] ED TO [facility] 2ND FALL WITH LEFT SIDE SAH + SDH AND T9 VERTEBRAL BODY FRACTURE    Orders Placed This Encounter   Procedures    Inpatient Admission     Standing Status:   Standing     Number of Occurrences:   1     Order Specific Question:   Admitting Physician     Answer:   Jonelle Borrego     Order Specific Question:   Level of Care     Answer:   Med Surg [16]     Order Specific Question:   Bed Type     Answer:   Trauma [7]     Order Specific Question:   Estimated length of stay     Answer:   More than 2 Midnights     Order Specific Question:   Certification     Answer:   I certify that inpatient services are medically necessary for this patient for a duration of greater than two midnights  See H&P and MD Progress Notes for additional information about the patient's course of treatment  ED: Date/Time/Mode of Arrival:   ED Arrival Information     Expected Arrival Acuity Means of Arrival Escorted By Service Admission Type    12/13/2017 19:43 12/13/2017 19:50 [facility]    Arrival Complaint    S/P fall, multpiles injuries, SDH SAH lumbar thoracic and rib fxs        Chief Complaint:   Chief Complaint   Patient presents with    Fall     Trauma transfer  Pt dx with L1 to L2, and a T break per EMS  Pt reportedly fell out of his recliner at his nursing home  No LOC reported pt alert and oriented to person, place, and time  History of Present Illness:   Cipriano Vaughn is a 80 y o  male who presents to the trauma service status post fall  Patient states he fell while getting out of a recliner  Patient states he fell into a heater that is next to his chair  Patient was taken to [facility] or use found to have fever body fractures in addition to a subarachnoid hemorrhage    Patient takes aspirin daily   Mechanism:Fall     Review of Systems   Constitutional: Negative for activity change, appetite change, chills, fatigue and fever  Respiratory: Negative  Cardiovascular: Negative for chest pain  Gastrointestinal: Negative for abdominal distention, abdominal pain, blood in stool, constipation, diarrhea, nausea and vomiting  Genitourinary: Negative for decreased urine volume, difficulty urinating, dysuria, enuresis, flank pain, frequency, hematuria, penile swelling, scrotal swelling, testicular pain and urgency  Musculoskeletal: Positive for back pain  Neurological: Positive for headaches         ED Vital Signs:   ED Triage Vitals   Temperature Pulse Respirations Blood Pressure SpO2   12/13/17 2000 12/13/17 1959 12/13/17 1959 12/13/17 2000 12/13/17 2000   98 °F (36 7 °C) 70 (!) 29 170/93 95 %      Temp Source Heart Rate Source Patient Position - Orthostatic VS BP Location FiO2 (%)   12/13/17 2000 12/14/17 0010 12/14/17 0010 12/14/17 0010 --   Oral Monitor Lying Right arm       Pain Score       12/13/17 2000       9        Wt Readings from Last 1 Encounters:   12/14/17 80 6 kg (177 lb 11 1 oz)     12/13 0701  12/14 0700 12/14 0701  12/14 1234  Most Recent     Temperature (°F) 97 698 7 98  98 (36 7)    Pulse 6284 62  62    Respirations 1438 18  18    Blood Pressure 126/76177/93 163/82  163/82    SpO2 (%) 9598 9798  97        LABS/Diagnostic Test Results:   CBC with diff    Lab Units 12/14/17  0554 12/13/17  1851   WBC Thousand/uL 8 19 9 29   HEMOGLOBIN g/dL 13 3 12 8   HEMATOCRIT % 39 8 38 7   MCV fL 91 92   PLATELETS Thousands/uL 119* 110*   MCH pg 30 4 30 5   MCHC g/dL 33 4 33 1   RDW % 14 2 14 0   MPV fL 10 7 10 4   NRBC AUTO /100 WBCs 0  --       CMP   Lab Units 12/14/17  0625 12/13/17  1851   SODIUM mmol/L 140 137   POTASSIUM mmol/L 3 9 4 7   CHLORIDE mmol/L 107 106   CO2 mmol/L 26 23   ANION GAP mmol/L 7 8   BUN mg/dL 21 24   CREATININE mg/dL 1 15 1 32*   GLUCOSE RANDOM mg/dL 135 125 CALCIUM mg/dL 8 2* 8 2*   AST U/L 12 29   ALT U/L 13 14   ALK PHOS U/L 51 53   TOTAL PROTEIN g/dL 6 8 6 5   ALBUMIN g/dL 3 2* 3 1*   BILIRUBIN TOTAL mg/dL 1 25* 1 50*   EGFR ml/min/1 73sq m 57 48     Results from last 7 days  Lab Units 12/14/17  0147 12/13/17  1851   INR   1 25* 1 15       CT HEAD -  Small foci of subdural and subarachnoid hemorrhage over the left parietal convexity, bilateral anterior-inferior frontal region and along the falx  No mass effect      CT THORACIC + LUMBAR SPINE -   1  Distracted T9 fracture with widening of the anterior T8-9 disc space, compatible with disc and ligamentous injury  Widening of the left T8-9 facet suggesting capsular injury  2  Chronic L1 and L2 fractures with possible superimposed acute fractures  MRI may be helpful for further evaluation  3  Fracture of the left T9 rib with the costovertebral junction      EKG:  Atrial fibrillation  Ventricular rate 58 bpm  QRSD interval 90 ms  QT interval 416 ms  QTc interval 408 ms      ED Treatment:   Medication Administration from 12/13/2017 1943 to 12/13/2017 2359       Date/Time Order Dose Route Action Action by Comments     12/13/2017 2201 acetaminophen (TYLENOL) tablet 975 mg 975 mg Oral Given Nikita Almeida RN      12/13/2017 2202 gabapentin (NEURONTIN) capsule 100 mg 100 mg Oral Given Nikita Almeida RN      12/13/2017 2257 HYDROmorphone (DILAUDID) 1 mg/mL injection 0 2 mg 0 2 mg Intravenous Given Arabella Burris RN      12/13/2017 2245 desmopressin (DDAVP) 20 mcg in sodium chloride 0 9 % 50 mL IVPB 0 mcg Intravenous Stopped Arabella Burris RN      12/13/2017 2206 desmopressin (DDAVP) 20 mcg in sodium chloride 0 9 % 50 mL IVPB 20 mcg Intravenous New Bag Arabella Burris RN           Past Medical/Surgical History:    Active Ambulatory Problems     Diagnosis Date Noted    Subd[facility] hematoma ([facility] [address] ) 02/03/2016    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[facility] [address] ) 02/04/2016    Atrial fibrillation ([facility] [address] ) 02/04/2016    History of coagulopathy 02/04/2016    Hypothyroidism 02/04/2016    Chronic anemia 02/04/2016    Hypertension 02/04/2016    Hyperlipidemia 02/04/2016    BPH (benign prostatic hyperplasia) 02/04/2016    History of subdural hematoma 02/04/2016    Osteoarthritis 02/04/2016    Ambulatory dysfunction 02/04/2016    Hypoalbuminemia due to protein-calorie malnutrition ([address] ) 02/04/2016    Benign prostatic hypertrophy     Hypothyroidism (acquired)     Closed fracture of second lumbar vertebra with routine healing 06/17/2016     Past Medical History:   Diagnosis Date    Actinic keratosis     Anemia     Atrial fibrillation (HCC)     Benign prostatic hypertrophy     Blood in urine     Cellulitis     Dermatitis     Disease of thyroid gland     Dyslipidemia     Epidural hematoma (HCC)     Epistaxis     Falling     Folate deficiency     GERD (gastroesophageal reflux disease)     Hip fracture (HCC)     Hyperlipidemia     Hypertension     Hypothyroidism (acquired)     Insomnia     Neuropathy     Nocturia     Osteoarthritis     Psychiatric disorder     Renal disorder     Sepsis ([address] )     Shoulder pain, right     Squamous cell carcinoma     Subdural hematoma (HCC)     Thrombophlebitis     Urinary retention        Admitting Diagnosis: Subdural hematoma ([address] ) [I62 00]  Unspecified multiple injuries, initial encounter [T07  XXXA]    Age/Sex: 80 y o  male      Assessment/Plan   Trauma Alert: Evaluation  Model of Arrival: Ambulance  Trauma Team:   Consultants: Neurosurgery: T spine and SAH  Time Called 9:30     Trauma Active Problems:  -SAH L  -T9 vertebral body fracture  -Chronic L1 fracture  -Chronic L2 fracture  -T9 rib fracture at the costovertebral junction     Trauma Plan:   -Neurosurgical consult for his upper rectal and hemorrhage as well as vertebral body fractures  -DDAVP  -Hot protocol   -Rib fx protocol   -Pt/ OT       Admission Orders:  12/13/17 AT 2127  ADMIT INPATIENT   LEVEL 2 STEPDOWN  HOT PROTOCOL  VS + NEURO CHECKS Q1-2HRS  NEUROVASCULAR CHECKS Q1HR X 24    OOB to Chair TID  Up + OOB as Tolerated  SCD    Regular House Diet    Continuous Infusions:        Scheduled Meds:   acetaminophen 975 mg Oral Q8H [address]   finasteride 5 mg Oral Daily   furosemide 10 mg Oral Daily   gabapentin 100 mg Oral HS   lidocaine 2 patch Transdermal Daily   methocarbamol 500 mg Oral Q8H [address]   tamsulosin 0 4 mg Oral Daily With Dinner     PRN Meds:      IV HYDROmorphone 0 2 mg q4hrs prn given x 3    oxyCODONE 2 5 mg q4hrs prn given x 1    Consult Card         Cardiology  Consults Date of Service: 12/14/2017 10:25 AM     Assessment:   1  Pre operative risk assessment for #2  2  T9 fracture with widening of T8 to T9 disc concerning for ligamentous injury- followed by trauma surgery  3  L SAH/SDH- s/p DDAVP  Holding home medication: aspirin  Neurosurgery consulted and pending  4  Atrial fibrillation  5  Mechanical falls      Plan:   Patient s/p mechanical fall  He has history of multiple mechanical falls  He denies palpitations, dizziness, lightheaded or chest pain prior or after the event  Reviewed ECG showing Atrial fibrillation with slow ventricular rate HR 58 bpm    Patient ambulates with a walker and is fairly active at the nursing facility  He denies chest pain or SOB with exertion  He has no history of known CAD or heart failure  Last echocardiogram 10/28/16 showed EF 60%, no regional wall motion abnormalities and  Mild to moderate mitral regurgitation  He follows with Dr Margarita Merida in the office  Last office visit note reviewed from 8/2017  Patient was on metoprolol 12 5 mg PO BID at that time  Would recommend restart prior to surgery  Monitor HR due to history of slow ventricular rate  Patient has no history of near syncope or syncope     From a cardiac standpoint, patient at acceptable risk to proceed as scheduled for OR for T9 fracture with widening of T8 to T9 disc concerning for ligamentous injury

## 2017-12-14 NOTE — RESPIRATORY THERAPY NOTE
RT Protocol Note  Sofie Garcia 80 y o  male MRN: [id number]  Unit/Bed#: [facility] 923-01 Encounter: [id number]    Assessment    Active Problems:    Subarachnoid bleed [facility])      Home Pulmonary Medications:      Past Medical History:   Diagnosis Date    Actinic keratosis     Anemia     chronic hemolytic anemia    Atrial fibrillation (HCC)     Benign prostatic hypertrophy     Blood in urine     Cellulitis     Dermatitis     Disease of thyroid gland     Dyslipidemia     Epidural hematoma (HCC)     Epistaxis     Falling     Folate deficiency     GERD (gastroesophageal reflux disease)     Hip fracture (HCC)     Hyperlipidemia     Hypertension     Hypothyroidism (acquired)     Insomnia     Neuropathy     Nocturia     Osteoarthritis     Psychiatric disorder     depression    Renal disorder     Acute renal failure    Sepsis (HCC)     Shoulder pain, right     Squamous cell carcinoma     Subdural hematoma (HCC)     Thrombophlebitis     Urinary retention      Social History     Social History    Marital status:      Spouse name: N/A    Number of children: N/A    Years of education: N/A     Social History Main Topics    Smoking status: Former Smoker     Quit date: 2/4/1976    Smokeless tobacco: Never Used    Alcohol use Yes      Comment: once a week    Drug use: No    Sexual activity: Not Asked     Other Topics Concern    None     Social History Narrative    None       Subjective         Objective    Physical Exam:   Assessment Type: Assess only  General Appearance: Awake  Respiratory Pattern: Spontaneous  Chest Assessment: Chest expansion symmetrical  Bilateral Breath Sounds: Diminished  Cough: Non-productive    Vitals:  Blood pressure (!) 177/93, pulse 68, temperature 97 6 °F (36 4 °C), temperature source Oral, resp  rate (!) 28, height 6' 2" (1 88 m), weight 80 6 kg (177 lb 11 1 oz), SpO2 97 %  Imaging and other studies: I have personally reviewed pertinent reports  Plan       Airway Clearance Plan: Incentive Spirometer     Resp Comments: Pt evaluated per airway clearance protocol in the ER  He is here post fall w/subsequent rib francture  BS diminish but clear  Pt started on IS at this time  Pt is not able to reach the goal but has a good technique  Will continue IS encouragment

## 2017-12-14 NOTE — PHYSICAL THERAPY NOTE
Physical Therapy Cancellation Note    PT eval deferred per TR - pt is on spine precautions pend NSx consult, possible sx; PT will f/u as appropriate

## 2017-12-14 NOTE — SOCIAL WORK
Cm spoke to pt's granddaughter who provided pertinent information  Pt is from [facility] assisted living PTA  Pt requires minimal assistance with some ADLs  Pt owns a walker  Pt obtains his medications there  Pt has no hx of mental health or substance abuse  Plan would be to return there in either the assisted living or in SNF capacity   CM will follow up with Nsg's plan

## 2017-12-14 NOTE — PROGRESS NOTES
Orthotic Note            Date: 12/14/2017      Patient Name: Ivonne Harvey   25 mins         Reason for Consult:  Patient Active Problem List   Diagnosis    Subd[facility]oma [address] [address] )    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[facility] [address] )    Atrial fibrillation ([facility] [address] )    History of coagulopathy    Hypothyroidism    Chronic anemia    Hypertension    Hyperlipidemia    BPH (benign prostatic hyperplasia)    History of subdural hematoma    Osteoarthritis    Ambulatory dysfunction    Hypoalbuminemia due to protein-calorie malnutrition (HCC)    Benign prostatic hypertrophy    Hypothyroidism (acquired)    Closed fracture of second lumbar vertebra with routine healing    Subarachnoid bleed [facility])   [place] TLSO Brace, Size Large  Per JCARLOS Mccarty  Per orthopedics, [employer] delivered and pre-adjusted [place] TLSO brace side panels to a size large  [employer] attempted to educate pt on the donning, doffing, and cleaning instructions of TLSO brace, but pt appeared to have difficulty following instructions  TLSO left at bedside  [employer] to follow up daily and ensure optimal fit of TLSO brace to pt      Recommendations:  Please call  in regards to bracing instructions and/or adjustments  Lyubov HERNANDEZ,CTRS

## 2017-12-15 LAB
ALBUMIN SERPL BCP-MCNC: 2.7 G/DL (ref 3.5–5)
ALP SERPL-CCNC: 45 U/L (ref 46–116)
ALT SERPL W P-5'-P-CCNC: 12 U/L (ref 12–78)
ANION GAP SERPL CALCULATED.3IONS-SCNC: 7 MMOL/L (ref 4–13)
AST SERPL W P-5'-P-CCNC: 13 U/L (ref 5–45)
BILIRUB DIRECT SERPL-MCNC: 0.34 MG/DL (ref 0–0.2)
BILIRUB SERPL-MCNC: 1.09 MG/DL (ref 0.2–1)
BUN SERPL-MCNC: 22 MG/DL (ref 5–25)
CALCIUM SERPL-MCNC: 8.1 MG/DL (ref 8.3–10.1)
CHLORIDE SERPL-SCNC: 106 MMOL/L (ref 100–108)
CO2 SERPL-SCNC: 26 MMOL/L (ref 21–32)
CREAT SERPL-MCNC: 1.01 MG/DL (ref 0.6–1.3)
GFR SERPL CREATININE-BSD FRML MDRD: 66 ML/MIN/1.73SQ M
GLUCOSE SERPL-MCNC: 110 MG/DL (ref 65–140)
POTASSIUM SERPL-SCNC: 3.8 MMOL/L (ref 3.5–5.3)
PROT SERPL-MCNC: 6.3 G/DL (ref 6.4–8.2)
SODIUM SERPL-SCNC: 139 MMOL/L (ref 136–145)

## 2017-12-15 PROCEDURE — G8987 SELF CARE CURRENT STATUS: HCPCS

## 2017-12-15 PROCEDURE — 97167 OT EVAL HIGH COMPLEX 60 MIN: CPT

## 2017-12-15 PROCEDURE — 97163 PT EVAL HIGH COMPLEX 45 MIN: CPT

## 2017-12-15 PROCEDURE — 82248 BILIRUBIN DIRECT: CPT | Performed by: PHYSICIAN ASSISTANT

## 2017-12-15 PROCEDURE — 94760 N-INVAS EAR/PLS OXIMETRY 1: CPT

## 2017-12-15 PROCEDURE — G8988 SELF CARE GOAL STATUS: HCPCS

## 2017-12-15 PROCEDURE — 80053 COMPREHEN METABOLIC PANEL: CPT | Performed by: PHYSICIAN ASSISTANT

## 2017-12-15 PROCEDURE — G8978 MOBILITY CURRENT STATUS: HCPCS

## 2017-12-15 PROCEDURE — 97760 ORTHOTIC MGMT&TRAING 1ST ENC: CPT

## 2017-12-15 PROCEDURE — G8979 MOBILITY GOAL STATUS: HCPCS

## 2017-12-15 RX ADMIN — ACETAMINOPHEN 975 MG: 325 TABLET, FILM COATED ORAL at 13:29

## 2017-12-15 RX ADMIN — FUROSEMIDE 10 MG: 20 TABLET ORAL at 08:41

## 2017-12-15 RX ADMIN — LIDOCAINE 2 PATCH: 50 PATCH TOPICAL at 08:42

## 2017-12-15 RX ADMIN — ACETAMINOPHEN 975 MG: 325 TABLET, FILM COATED ORAL at 05:27

## 2017-12-15 RX ADMIN — ACETAMINOPHEN 975 MG: 325 TABLET, FILM COATED ORAL at 22:05

## 2017-12-15 RX ADMIN — FINASTERIDE 5 MG: 5 TABLET, FILM COATED ORAL at 08:42

## 2017-12-15 RX ADMIN — METHOCARBAMOL 500 MG: 500 TABLET ORAL at 13:29

## 2017-12-15 RX ADMIN — METHOCARBAMOL 500 MG: 500 TABLET ORAL at 05:27

## 2017-12-15 RX ADMIN — GABAPENTIN 100 MG: 100 CAPSULE ORAL at 22:06

## 2017-12-15 RX ADMIN — TAMSULOSIN HYDROCHLORIDE 0.4 MG: 0.4 CAPSULE ORAL at 17:06

## 2017-12-15 RX ADMIN — METHOCARBAMOL 500 MG: 500 TABLET ORAL at 22:06

## 2017-12-15 NOTE — OCCUPATIONAL THERAPY NOTE
[address] Evaluation     Patient Name: Jil Crystal  [id number] Date: 12/15/2017  Problem List  Patient Active Problem List   Diagnosis    Subdural hematoma (HCC)    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[address] )    Atrial fibrillation ([address] )    History of coagulopathy    Hypothyroidism    Chronic anemia    Hypertension    Hyperlipidemia    BPH (benign prostatic hyperplasia)    History of subdural hematoma    Osteoarthritis    Ambulatory dysfunction    Hypoalbuminemia due to protein-calorie malnutrition (HCC)    Benign prostatic hypertrophy    Hypothyroidism (acquired)    Closed fracture of second lumbar vertebra with routine healing    Subarachnoid bleed (HCC)     Past Medical History  Past Medical History:   Diagnosis Date    Actinic keratosis     Anemia     chronic hemolytic anemia    Atrial fibrillation (HCC)     Benign prostatic hypertrophy     Blood in urine     Cellulitis     Dermatitis     Disease of thyroid gland     Dyslipidemia     Epidural hematoma (HCC)     Epistaxis     Falling     Folate deficiency     GERD (gastroesophageal reflux disease)     Hip fracture (HCC)     Hyperlipidemia     Hypertension     Hypothyroidism (acquired)     Insomnia     Neuropathy     Nocturia     Osteoarthritis     Psychiatric disorder     depression    Renal disorder     Acute renal failure    Sepsis (HCC)     Shoulder pain, right     Squamous cell carcinoma     Subdural hematoma (HCC)     Thrombophlebitis     Urinary retention      Past Surgical History  Past Surgical History:   Procedure Laterality Date    HIP SURGERY           12/15/17 0930   Note Type   Note type Eval/Treat   Restrictions/Precautions   Weight Bearing Precautions Per Order No   Braces or Orthoses TLSO;C/S Collar   Other Precautions Spinal precautions   Pain Assessment   Pain Assessment 0-10   Pain Score 6   Pain Type Acute pain   Pain Location Neck;Back   Pain Orientation Bilateral   Hospital Pain Intervention(s) Repositioned; Ambulation/increased activity; Emotional support   Home Living   Type of Home Assisted living  ([facility] )   Home Layout One level   [address]   Prior Function   Level of [place] Needs assistance with IADLs; Needs assistance with ADLs and functional mobility   Lives With Facility staff   Receives Help From Personal care attendant   ADL Assistance Needs assistance   IADLs Needs assistance   Falls in the last 6 months 1 to 4   Vocational Retired   [address] - ADMITS TO 2 FALLS - MEALS/HOMEMAKING PROVIDED   Reciprocal Relationships SUPPORTIVE FAMILY   Service to Others RETIRED    Intrinsic Gratification SEDENTARY PTA   Subjective   Subjective OFFERS NO C/O    ADL   Eating Assistance 5  Supervision/Setup   Grooming Assistance 4  Minimal Assistance   UB Bathing Assistance 3  Moderate Assistance   LB Bathing Assistance 2  Maximal Assistance   UB Dressing Assistance 3  Moderate Assistance   LB Dressing Assistance 2  Maximal [address]  2  Maximal Assistance   Bed Mobility   Supine to Sit 3  Moderate assistance   Additional items Assist x 2   Transfers   Sit to Stand 3  Moderate assistance   Additional items Assist x 2   Stand to Sit 3  Moderate assistance   Additional items Assist x 2   Stand pivot 3  Moderate assistance   Additional items Assist x 2   Balance   Static Sitting Fair +   Dynamic Sitting Fair   Static Standing Fair -   Dynamic Standing Poor   Ambulatory Zero   Activity Tolerance   Activity Tolerance Patient limited by fatigue;Patient limited by pain   RUE Assessment   RUE Assessment WFL   LUE Assessment   LUE Assessment WFL   Cognition   Arousal/Participation Alert   Attention Attends with cues to redirect   Orientation Level Oriented X4   Memory Decreased short term memory;Decreased recall of precautions   Following Commands Follows multistep commands with increased time or repetition   Assessment   Limitation Decreased ADL status; Decreased UE strength;Decreased Safe judgement during ADL;Decreased endurance;Decreased self-care trans   Prognosis Good   Assessment Pt is a 80 y o  male who was [facility] to [facility] on 12/13/2017 with SDH, T9 chance fx, T8-T9 possible ligamentous injury, L1-L2 acute on chronic fx   Pt's problem list also includes PMH of HTN, previous surgery, cancer history, neuropathy and hld, hypothyroid, insomnia, osteoarthritis, sepsis, renal failure, sdh, edh, anemia, bph, cellulitis, dyslipidemia, gerd, hip fx, insomnia  At baseline pt was completing adls with assist from facility staff  Pt lives at [facility]  Currently pt requires max assist for overall ADLS and mod a x 2 for functional mobility/transfers  Pt currently presents with impairments in the following categories -difficulty performing ADLS, difficulty performing IADLS  and health management  activity tolerance, endurance, standing balance/tolerance, memory and safety   These impairments, as well as pt's fatigue, pain, spinal precautions and risk for falls  limit pt's ability to safely engage in all baseline areas of occupation, includingeating, grooming, bathing, dressing, toileting, functional mobility/transfers, social participation  and leisure activities  From OT standpoint, recommend inpt rehab upon D/C  OT will continue to follow to address the below stated goals  Goals   Patient Goals get better   LTG Time Frame 10-14   Long Term Goal #1 refer to established goals below   Plan   Treatment Interventions ADL retraining;Functional transfer training; Endurance training;Patient/family training;Cognitive reorientation;Equipment evaluation/education; Compensatory technique education; Activityengagement   Goal Expiration Date 12/29/17   OT Frequency 3-5x/wk   Recommendation   OT Discharge Recommendation Short Term Rehab   Barthel Index   Feeding 5   Bathing 0   Grooming Score 0   Dressing Score 0   Bladder Score 5   Bowels Score 5   Toilet Use Score 5   Transfers (Bed/Chair) Score 5   Mobility (Level Surface) Score 0   Stairs Score 0   Barthel Index Score 25       OCCUPATIONAL THERAPY GOALS:    *I FEEDING/GROOMING AFTER SETUP  *MIN A  ADLS AFTER SETUP WITH USE OF ADAPTIVE DEVICES PRN  *MIN A TOILETING AND CLOTHING MANAGEMENT   *MIN A  FUNCTIONAL MOB AND TRANSFERS TO ALL SURFACES WITH FAIR TO FAIR+ BALANCE/SAFETY FOR PARTICIPATION IN DYNAMIC ADLS   *DEMONSTRATE FAIR+ CARRYOVER WITH SAFE USE OF RW, MANAGEMENT OF TLSO, AND CARRYOVER OF SPINAL PREC AND BODY MECHANICS DURING FUNCTIONAL TASKS  *ASSESS DME NEEDS  *INCREASE ACTIVITY TOLERANCE TO 35-40 MIN FOR PARTICIPATION IN ADLS AND ENJOYABLE ACTIVITIES     *PT TO PARTICIPATE IN ONGOING FUNCTIONAL COGNITIVE ASSESSMENT WITH GOOD ATTENTION/PARTICIPATION TO ASSIST WITH SAFE D/C RECOMMENDATIONS     Zackary Landa, OT

## 2017-12-15 NOTE — PLAN OF CARE
DISCHARGE PLANNING     Discharge to home or other facility with appropriate resources Progressing        Nutrition/Hydration-ADULT     Nutrient/Hydration intake appropriate for improving, restoring or maintaining nutritional needs Progressing        PAIN - ADULT     Verbalizes/displays adequate comfort level or baseline comfort level Progressing        Potential for Falls     Patient will remain free of falls Progressing        Prexisting or High Potential for Compromised Skin Integrity     Skin integrity is maintained or improved Progressing        SAFETY ADULT     Maintain or return to baseline ADL function Progressing     Maintain or return mobility status to optimal level Progressing

## 2017-12-15 NOTE — PHYSICAL THERAPY NOTE
Physical Therapy Evaluation    Patient's Name: Adrian Chowdhury    Admitting Diagnosis  Subdural hematoma ([facility] [address] ) [I62 00]  Unspecified multiple injuries, initial encounter [T07  XXXA]    Problem List  Patient Active Problem List   Diagnosis    Subdural hematoma (HCC)    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[address] )    Atrial fibrillation ([address] )    History of coagulopathy    Hypothyroidism    Chronic anemia    Hypertension    Hyperlipidemia    BPH (benign prostatic hyperplasia)    History of subdural hematoma    Osteoarthritis    Ambulatory dysfunction    Hypoalbuminemia due to protein-calorie malnutrition (HCC)    Benign prostatic hypertrophy    Hypothyroidism (acquired)    Closed fracture of second lumbar vertebra with routine healing    Subarachnoid bleed (HCC)       Past Medical History  Past Medical History:   Diagnosis Date    Actinic keratosis     Anemia     chronic hemolytic anemia    Atrial fibrillation (HCC)     Benign prostatic hypertrophy     Blood in urine     Cellulitis     Dermatitis     Disease of thyroid gland     Dyslipidemia     Epidural hematoma (HCC)     Epistaxis     Falling     Folate deficiency     GERD (gastroesophageal reflux disease)     Hip fracture (HCC)     Hyperlipidemia     Hypertension     Hypothyroidism (acquired)     Insomnia     Neuropathy     Nocturia     Osteoarthritis     Psychiatric disorder     depression    Renal disorder     Acute renal failure    Sepsis (HCC)     Shoulder pain, right     Squamous cell carcinoma     Subdural hematoma (HCC)     Thrombophlebitis     Urinary retention        Past Surgical History  Past Surgical History:   Procedure Laterality Date    HIP SURGERY        12/15/17 4265   Note Type   Note type Eval only   Pain Assessment   Pain Assessment 0-10   Pain Score 6   Pain Type Acute pain   Pain Location Back;Neck   Pain Orientation Bilateral;Lower;Mid   Hospital Pain Intervention(s) Repositioned; Ambulation/increased activity; Elevated; Emotional support; Environmental changes; Rest   Home Living   Type of Home Assisted living  (CM [facility];needs A for some ADLs and ambulates with RW)   Home Layout One level   [address]  (use of RW for mobility PTA)   Additional Comments pt resides at [facility] PTA with needing "some" A for ADLS and use of RW for mobility   Prior Function   Level of [place] Needs assistance with IADLs; Needs assistance with ADLs and functional mobility  (per pt PTA)   Lives With (staff and residents at [facility])   [address] Help From (staff at facility as needed PTA)   ADL Assistance Needs assistance   IADLs Needs assistance   Falls in the last 6 months 1 to 4  (x2)   Restrictions/Precautions   Braces or Orthoses TLSO;C/S Collar  (TLSO back brace and [employer] c-collar)   Other Precautions Spinal precautions;Hard of hearing;Pain; Fall Risk;Telemetry;Multiple lines; Chair Alarm   General   Additional Pertinent History s/p fall sustaining T9 chance fx (nonop),T8-T9 disc space widening with possible ligamentous injury,L1-L2 fx acute on chronic;h/o hip fx and L1-L2 fx about 6 months ago from s/p fall   Family/Caregiver Present Yes  (daughter)   Cognition   Overall Cognitive Status Impaired   Arousal/Participation Cooperative   Orientation Level Oriented X4   Following Commands Follows one step commands with increased time or repetition  (2* inc pain and slow mobility)   RLE Assessment   RLE Assessment (3+/5 at least grossly throughout)   LLE Assessment   LLE Assessment (3+/5 at least grossly throughout)   Coordination   Movements are Fluid and Coordinated 0   Coordination and Movement Description ataxic and unsteady,slow mobility and casey,pain   Sensation WFL   Light Touch   RLE Light Touch Grossly intact   LLE Light Touch Grossly intact   Bed Mobility   Rolling L 3  Moderate assistance   Additional items Assist x 2;HOB elevated; Bedrails; Increased time required;Verbal cues;LE management  (abiding to spine precautions throughout)   Supine to Sit 3  Moderate assistance   Additional items Assist x 2;HOB elevated; Bedrails; Increased time required;Verbal cues;LE management  (abiding to spine precautions throughout)   Additional Comments TLSO back brace donned dependently at EOB prior to transfers and mobility;education verbally and physically given to pt throughout donning of TLSO back brace and pertaining to spine precautions   Transfers   Sit to Stand 3  Moderate assistance   Additional items Assist x 2;Bedrails; Increased time required;Verbal cues   Stand to Sit 3  Moderate assistance   Additional items Assist x 2;Armrests; Increased time required;Verbal cues  (for safety,education and control descent)   Stand pivot 3  Moderate assistance   Additional items Assist x 2; Increased time required;Verbal cues   Ambulation/Elevation   Gait pattern Poor UE support; Improper Weight shift; Antalgic;Narrow [place]; Forward Flexion; Shuffling; Inconsistent casey; Foward flexed; Short stride; Ataxia; Step to;Excessively slow   Gait Assistance 3  Moderate assist   Additional items Assist x 2;Verbal cues; Tactile cues   Assistive Device Rolling walker   Distance 5 steps bed->recliner with use of RW on tile surface;limited ambulation distance 2* dec BLE strength and ataxic mobility and gait,pt fatigues quickly   Balance   Static Sitting Good  (with chair alarm intact prior to leaving pt;s room)   Dynamic Sitting (zero)   Static Standing Zero   Dynamic Standing (zero)   Ambulatory Zero   Endurance Deficit   Endurance Deficit Yes   Endurance Deficit Description pain,deconditioning,weakness,fatigue   Activity Tolerance   Activity Tolerance Patient limited by fatigue;Patient limited by pain  (poor)   Medical Staff Made Aware CM  and OT Sabino Luevano)   Nurse Made Aware yes   Assessment   Prognosis Guarded   Problem List Decreased strength;Decreased endurance; Impaired balance;Decreased mobility; Decreased coordination;Decreased safety awareness; Impaired hearing;Decreased skin integrity;Pain;Orthopedic restrictions   Assessment Pt is a 79 y/o male admitted to [facility] 2* s/p fall sustaining T9 chance fx (nonop),T8-T9 disc space widening with possible ligamentous injury,L1-L2 acute on chronic and h/o falls  Pt resides at [facility] with use of RW for mobility PTA and needs A for "some" ADLs PTA  Pt currently is not at functional mobility baseline with inc pain neck and back area,current use of Rw and c-collar brace and TLSO back brace,needsAx1-2 for mobility,dec BLE strength,ongoing medical status,IV medicine management  Pt demonstrates moderate deficits during functional mobility and gait including dec endurance,dec balance,inc neck and back pain at rest and during mobility,dec BLE strength and needs modAx2 for BM,transfers and gait with use of RW  Pt would cont to benefit from skilled inpt PT services to maximize functional independence   Goals   Patient Goals to get better   STG Expiration Date 12/25/17   Short Term Goal #1 in 7-10 days:pt will be able to ambulate >50 feet with use of RW,TLSO back brace,[employer] [facility] c-collar and chair follow needing minAx1 on various surfaces,activity tolerance:45mins/45mins,inc balance 1 grade,BM and transfers minAx1 consistently to and from various surfaces,I with BLE HEP   Treatment Day 0   Plan   Treatment/Interventions Functional transfer training; Endurance training;Patient/family training;Equipment eval/education; Bed mobility;Gait training;Spoke to nursing;Spoke to case management;OT;Family   PT Frequency Weekend;5x/wk   Recommendation   Recommendation Other (Comment)  (inpt rhb)   Equipment Recommended Walker  (cont use of Rw for mobility with A)   Barthel Index   Feeding 5   Bathing 0   Grooming Score 0   Dressing Score 0   Bladder Score 5   Bowels Score 5   Toilet Use Score 5   Transfers (Bed/Chair) Score 5   Mobility (Level Surface) Score 0   Stairs Score 0   Barthel Index Score 25   Skilled PT recommended while in hospital and upon DC to progress pt toward treatment goals           Yen Storm, PT

## 2017-12-15 NOTE — DISCHARGE INSTRUCTIONS
Discharge Instructions - Orthopedics  Grant Crockett 80 y o  male MRN: [id number]  Unit/Bed#: [facility] 934-01    Weight Bearing Status:                                           Weight bearing as tolerated in TLSO brace     Pain:  Continue analgesics as directed    PT/OT:  Continue PT/OT on outpatient basis as directed    Appt Instructions: If you do not have your appointment, please call the clinic at [phone] to f/u with Dr Doris Musa in 2 weeks      Contact the office sooner if you experience any increased numbness/tingling in the extremities  Neurosurgery Discharge Recommendations  Do not take any blood thinning medications (ie  No Advil  No motrin  No ibuprofen  No Aleve  No Aspirin  No Plavix  No fishoil  No heparin  No antiplatelet / no anticoagulation medication such as Plavix, Xarelto, Eliquis, etc)  Refrain from activity that increases chance of trauma to head or falls  Recommend you take fall precaution  No strenuous activity or sports  Return to hospital Emergency Room if you experience worsening / new headache, nausea/vomiting, speech/vision change, seizure, confusion / mental status change, weakness, or other neurological changes

## 2017-12-15 NOTE — PROGRESS NOTES
Cardiology Progress Note - Barbara Velasco 80 y o  male MRN: [id number]    Unit/Bed#: [facility] 934-01 Encounter: [id number]      Assessment:  Active Problems:    Subarachnoid bleed [facility])   Atrial fibrillation    Plan:  Patient is comfortable this morning  He has no chest pain or significant dyspnea  He remains with a hard collar in place  It has been decided to manage his thoracic fracture non operatively  His BMP today looks good  I have made no change in his medical regimen  Subjective:   Patient seen and examined  No significant events overnight   negative  Objective:     Vitals: Blood pressure 114/64, pulse 70, temperature 97 9 °F (36 6 °C), temperature source Oral, resp  rate 18, height 6' 2" (1 88 m), weight 80 6 kg (177 lb 11 1 oz), SpO2 98 %  , Body mass index is 22 81 kg/m² , Orthostatic Blood Pressures    Flowsheet Row Most Recent Value   Blood Pressure  114/64 filed at 12/15/2017 0300   Patient Position - Orthostatic VS  Lying filed at 12/15/2017 0300      ,      Intake/Output Summary (Last 24 hours) at 12/15/17 0746  Last data filed at 12/15/17 3159   Gross per 24 hour   Intake              440 ml   Output              675 ml   Net             -235 ml             Physical Exam:    GEN: Barbara Velasco appears well, alert and oriented x 3, pleasant and cooperative   NECK:  Hard collar in place    HEART: normal rate, irregular rhythm, normal S1 and S2, no murmurs, clicks, gallops or rubs   LUNGS: clear to auscultation bilaterally; no wheezes, rales, or rhonchi   ABDOMEN: normal bowel sounds, soft, no tenderness, no distention  EXTREMITIES: peripheral pulses normal; no clubbing, cyanosis, or edema  SKIN: warm and well perfused, no suspicious lesions on exposed skin    Labs & Results:    Admission on 12/13/2017   Component Date Value    Protime 12/14/2017 15 8*    INR 12/14/2017 1 25*    PTT 12/14/2017 32     Sodium 12/14/2017 140     Potassium 12/14/2017 3 9     Chloride 12/14/2017 107     CO2 12/14/2017 26     Anion Gap 12/14/2017 7     BUN 12/14/2017 21     Creatinine 12/14/2017 1 15     Glucose 12/14/2017 135     Calcium 12/14/2017 8 2*    AST 12/14/2017 12     ALT 12/14/2017 13     Alkaline Phosphatase 12/14/2017 51     Total Protein 12/14/2017 6 8     Albumin 12/14/2017 3 2*    Total Bilirubin 12/14/2017 1 25*    eGFR 12/14/2017 57     WBC 12/14/2017 8 19     RBC 12/14/2017 4 38     Hemoglobin 12/14/2017 13 3     Hematocrit 12/14/2017 39 8     MCV 12/14/2017 91     MCH 12/14/2017 30 4     MCHC 12/14/2017 33 4     RDW 12/14/2017 14 2     MPV 12/14/2017 10 7     Platelets 74/15/8778 119*    nRBC 12/14/2017 0     Neutrophils Relative 12/14/2017 78*    Lymphocytes Relative 12/14/2017 10*    Monocytes Relative 12/14/2017 12     Eosinophils Relative 12/14/2017 0     Basophils Relative 12/14/2017 0     Neutrophils Absolute 12/14/2017 6 38     Lymphocytes Absolute 12/14/2017 0 84     Monocytes Absolute 12/14/2017 0 96     Eosinophils Absolute 12/14/2017 0 00     Basophils Absolute 12/14/2017 0 00     Ventricular Rate 12/14/2017 58     Atrial Rate 12/14/2017 416     QRSD Interval 12/14/2017 90     QT Interval 12/14/2017 416     QTC Interval 12/14/2017 408     QRS Axis 12/14/2017 6     T Wave Axis 12/14/2017 34     Sodium 12/15/2017 139     Potassium 12/15/2017 3 8     Chloride 12/15/2017 106     CO2 12/15/2017 26     Anion Gap 12/15/2017 7     BUN 12/15/2017 22     Creatinine 12/15/2017 1 01     Glucose 12/15/2017 110     Calcium 12/15/2017 8 1*    AST 12/15/2017 13     ALT 12/15/2017 12     Alkaline Phosphatase 12/15/2017 45*    Total Protein 12/15/2017 6 3*    Albumin 12/15/2017 2 7*    Total Bilirubin 12/15/2017 1 09*    eGFR 12/15/2017 66     Bilirubin, Direct 12/15/2017 0 34*       Ct Head Wo Contrast    Result Date: 12/14/2017  Narrative: CT BRAIN - WITHOUT CONTRAST INDICATION:  Trauma COMPARISON:  12/13/2017 TECHNIQUE:  CT examination of the brain was performed  In addition to axial images, coronal reformatted images were created and submitted for interpretation  Radiation dose length product (DLP) for this visit:  1134 15 mGy-cm   This examination, like all CT scans performed in the [facility], was performed utilizing techniques to minimize radiation dose exposure, including the use of iterative reconstruction and automated exposure control  IMAGE QUALITY:  Diagnostic  FINDINGS:  PARENCHYMA:  Decreased attenuation is noted in the supratentorial white matter demonstrating an appearance most consistent with moderate microangiopathic change  No intracranial mass, mass effect or midline shift  No CT signs of acute infarction  Subdural hemorrhage is noted along the dorsal superior falx best seen on 2/34 measuring up to 5 mm in thickness without significant mass effect or midline shift  There is also sulcal hemorrhage noted within the left parietal lobe best seen on series 400 image 81 with minimal mass effect  There is an adjacent subdural hematoma left posterior parietal region measuring 2 to 3 mm in thickness without significant mass effect or midline shift  Small right parafalcine subdural hematoma on 2/38 measuring 2 mm in thickness without mass effect or midline shift  Left frontal subdural hematoma on 2/30, stable  Right frontal subdural hematoma on 2/31 is stable  Tiny right temporal lobe subdural hematoma on 2/29 without significant mass effect or midline shift  VENTRICLES AND EXTRA-AXIAL SPACES:  Age-appropriate volume loss is noted  No hydrocephalus  VISUALIZED ORBITS AND PARANASAL SINUSES:  Unremarkable  CALVARIUM AND EXTRACRANIAL SOFT TISSUES:   Normal      Impression: 1  Bifrontal subdural hematomas, parafalcine subdural hematomas, left posterior parietal subdural and subarachnoid hematomas all appear stable    There is also a right temporal lobe small subdural hematoma without significant mass effect or midline shift  that is likely stable in retrospect  2   Cortical atrophy with microangiopathic changes noted as before  I personally discussed this study with Dr Marlena Avilez on 12/14/2017 12:47 PM  Workstation performed: [id number]     Ct Head Without Contrast    Result Date: 12/13/2017  Narrative: CT BRAIN - WITHOUT CONTRAST INDICATION:  Headache and head trauma  COMPARISON:  6/2/2017  TECHNIQUE:  CT examination of the brain was performed  In addition to axial images, coronal reformatted images were created and submitted for interpretation  Radiation dose length product (DLP) for this visit:  1136 mGy-cm   This examination, like all CT scans performed in the [facility], was performed utilizing techniques to minimize radiation dose exposure, including the use of iterative reconstruction and automated exposure control  IMAGE QUALITY:  Diagnostic  FINDINGS:  PARENCHYMA:  Punctate focus of acute subarachnoid hemorrhage over the left parietal convexity  Possible tiny foci of subdural hemorrhage along the posterior falx measuring up to 3 mm in thickness  Minimal subdural hemorrhage along the anterior inferior aspect of the falx measuring up to 3 mm  Adjacent bilateral anterior inferior frontal subdural hemorrhage, also measuring up to 3 mm in thickness  Few punctate foci of acute subdural hemorrhage over the anterior inferior frontal region bilaterally  Periventricular and subcortical white matter lesions, consistent with microangiopathic disease  No intracranial mass, mass effect or midline shift  No CT signs of acute infarction  VENTRICLES AND EXTRA-AXIAL SPACES:  Normal for patient's age  VISUALIZED ORBITS AND PARANASAL SINUSES:  Unremarkable  CALVARIUM AND EXTRACRANIAL SOFT TISSUES:  No evidence of acute calvarial fracture or soft tissue hematoma        Impression: Small foci of subdural and subarachnoid hemorrhage over the left parietal convexity, bilateral anterior-inferior frontal region and along the falx  No mass effect  Workstation performed: [id number]     Ct Spine Cervical Wo Contrast    Result Date: 12/14/2017  Narrative: CT CERVICAL SPINE - WITHOUT CONTRAST INDICATION: Trauma COMPARISON: 1/22/2014 TECHNIQUE:  CT examination of the cervical spine was performed without intravenous contrast   Contiguous axial images were obtained  Sagittal and coronal reconstructions were performed  Radiation dose length product (DLP) for this visit:  434 57 mGy-cm   This examination, like all CT scans performed in the [facility], was performed utilizing techniques to minimize radiation dose exposure, including the use of iterative  reconstruction and automated exposure control  IMAGE QUALITY:  Diagnostic  FINDINGS: ALIGNMENT:  Moderate cervical levoscoliosis identified  Minimal C7 on T1 anterolisthesis related to facet arthropathy  VERTEBRAL BODIES:  No fracture  DEGENERATIVE CHANGES:  Severe multilevel cervical degenerative changes are noted  No critical central canal stenosis  PREVERTEBRAL AND PARASPINAL SOFT TISSUES: Normal         THORACIC INLET:  Mild biapical pleural parenchymal scarring noted  Impression: No cervical spine fracture or traumatic malalignment  Workstation performed: [id number]     Ct Spine Thoracic & Lumbar Wo Contrast    Result Date: 12/13/2017  Narrative: CT THORACIC AND LUMBAR SPINE INDICATION:  Back pain and trauma  COMPARISON:  6/16/2016 TECHNIQUE:  Contiguous axial images were obtained  Sagittal and coronal reconstructions were performed  Radiation dose length product (DLP) for this visit:  070 8277 2691 mGy-cm   This examination, like all CT scans performed in the [facility], was performed utilizing techniques to minimize radiation dose exposure, including the use of iterative reconstruction and automated exposure control  IMAGE QUALITY:  Diagnostic  FINDINGS: ALIGNMENT:  Stable thoracic dextroscoliosis and lower lumbar levoscoliosis   VERTEBRAL BODIES: There is widening of the anterior T8-9 disc space measuring 17 mm  There is an oblique fracture through the T9 vertebral body  Posterior elements are grossly intact  There is mild widening of the left T8-9 facet suggesting capsular injury  No subluxation  There is a chronic L1 fracture  Anterolisthesis seen within the vertebral body may represent a partially healed fracture versus superimposed acute fracture  There is a chronic L2 fracture, also with subtle linear lucency, concerning for superimposed acute fracture  Fracture of the left T9 rib at the costovertebral junction  DEGENERATIVE CHANGES:  There is severe loss of disc height, endplate sclerosis and posterior disc osteophyte complexes in the lower thoracic and lumbar spine  Mild anterolisthesis of L4 is likely secondary to chronic disc and facet degenerative change severe bilateral bony neural foraminal narrowing at L4-5  Multilevel mild to moderate central canal stenosis in the lower thoracic and lumbar spine  PARASPINAL SOFT TISSUES:      No paraspinal hematoma  Trace amount of pleural fluid bilaterally  Impression: 1  Distracted T9 fracture with widening of the anterior T8-9 disc space, compatible with disc and ligamentous injury  Widening of the left T8-9 facet suggesting capsular injury  2  Chronic L1 and L2 fractures with possible superimposed acute fractures  MRI may be helpful for further evaluation  3  Fracture of the left T9 rib with the costovertebral junction  Workstation performed: [id number]       EKG personally reviewed by Isac Alpers, MD      Counseling / Coordination of Care  Total floor / unit time spent today 30 minutes  Greater than 50% of total time was spent with the patient and / or family counseling and / or coordination of care

## 2017-12-15 NOTE — SOCIAL WORK
CM spoke to [place] from [facility]  There is a bed for him tomorrow  Pt can d/c there as per medicare midnights

## 2017-12-15 NOTE — PLAN OF CARE
Problem: PHYSICAL THERAPY ADULT  Goal: Performs mobility at highest level of function for planned discharge setting  See evaluation for individualized goals  Treatment/Interventions: Functional transfer training, Endurance training, Patient/family training, Equipment eval/education, Bed mobility, Gait training, Spoke to nursing, Spoke to case management, OT, Family  Equipment Recommended: Walker (cont use of Rw for mobility with A)       See flowsheet documentation for full assessment, interventions and recommendations  Prognosis: Guarded  Problem List: Decreased strength, Decreased endurance, Impaired balance, Decreased mobility, Decreased coordination, Decreased safety awareness, Impaired hearing, Decreased skin integrity, Pain, Orthopedic restrictions  Assessment: Pt is a 79 y/o male admitted to \[facility]\"" 2* s/p fall sustaining T9 chance fx (nonop),T8-T9 disc space widening with possible ligamentous injury,L1-L2 acute on chronic and h/o falls  Pt resides at [address] [place] [address] with use of RW for mobility PTA and needs A for "some" ADLs PTA  Pt currently is not at functional mobility baseline with inc pain neck and back area,current use of Rw and c-collar brace and TLSO back brace,needsAx1-2 for mobility,dec BLE strength,ongoing medical status,IV medicine management  Pt demonstrates moderate deficits during functional mobility and gait including dec endurance,dec balance,inc neck and back pain at rest and during mobility,dec BLE strength and needs modAx2 for BM,transfers and gait with use of RW  Pt would cont to benefit from skilled inpt PT services to maximize functional independence        Recommendation: Other (Comment) (inpt rhb)          See flowsheet documentation for full assessment

## 2017-12-15 NOTE — PROGRESS NOTES
Orthopedics   Blake Matos 80 y o  male MRN: [id number]  [facility]      Subjective:   80 y o male with a T9 chance fracture  No events overnight  Complains of persistent back pain  Labs:    0  Lab Value Date/Time   HCT 39 8 12/14/2017 0554   HCT 38 7 12/13/2017 1851   HCT 39 6 06/07/2017 1055   HCT 27 5 (L) 01/26/2014 0555   HCT 29 5 (L) 01/25/2014 0609   HCT 29 4 (L) 01/24/2014 0613   HGB 13 3 12/14/2017 0554   HGB 12 8 12/13/2017 1851   HGB 13 3 06/07/2017 1055   HGB 9 2 (L) 01/26/2014 0555   HGB 9 7 (L) 01/25/2014 0609   HGB 9 7 (L) 01/24/2014 0613   INR 1 25 (H) 12/14/2017 0147   INR 1 44 (H) 01/24/2014 0613   WBC 8 19 12/14/2017 0554   WBC 9 29 12/13/2017 1851   WBC 5 15 06/07/2017 1055   WBC 6 08 01/26/2014 0555   WBC 6 23 01/24/2014 0613   WBC 6 21 01/23/2014 0516       Meds:    Current Facility-Administered Medications:     acetaminophen (TYLENOL) tablet 975 mg, 975 mg, Oral, Q8H [address], Jayashree Cranker, DO, 975 mg at 12/15/17 4532    finasteride (PROSCAR) tablet 5 mg, 5 mg, Oral, Daily, [place] [employer], PA-C, 5 mg at 12/14/17 1028    furosemide (LASIX) tablet 10 mg, 10 mg, Oral, Daily, [place] [employer], PA-C, 10 mg at 12/14/17 1028    gabapentin (NEURONTIN) capsule 100 mg, 100 mg, Oral, HS, Jayashree Cranker, DO, 100 mg at 12/14/17 2216    HYDROmorphone (DILAUDID) 1 mg/mL injection 0 2 mg, 0 2 mg, Intravenous, Q4H PRN, Jayashree Cranker, DO, 0 2 mg at 12/14/17 1223    lidocaine (LIDODERM) 5 % patch 2 patch, 2 patch, Transdermal, Daily, [place] [employer], PA-C, 2 patch at 12/14/17 1028    methocarbamol (ROBAXIN) tablet 500 mg, 500 mg, Oral, Q8H [address], [place] [employer], PA-C, 500 mg at 12/15/17 8785    oxyCODONE (ROXICODONE) IR tablet 2 5 mg, 2 5 mg, Oral, Q4H PRN, Rosalee Cranker, DO, 2 5 mg at 12/14/17 2216    tamsulosin (FLOMAX) capsule 0 4 mg, 0 4 mg, Oral, Daily With Dinner, [place] [employer], PA-C, 0 4 mg at 12/14/17 1614    Blood Culture:   No results found for: BLOODCX    Wound Culture:    No results found for: WOUNDCULT    Ins and Outs:  I/O last 24 hours: In: 740 [P O :740]  Out: 1000 [Urine:1000]          Physical Exam:   /64   Pulse 70   Temp 97 9 °F (36 6 °C) (Oral)   Resp 18   Ht 6' 2" (1 88 m)   Wt 80 6 kg (177 lb 11 1 oz)   SpO2 98%   BMI 22 81 kg/m²   Musculoskeletal: BACK  · Skin intact  · Tenderness to palpation mid/lower thoracic spine, mild tenderness palpation along lumbar spine  · 4/5 strength in L2-S1 myotomes and bilateral lower extremities  · Sensation intact to light touch in L2-S1 dermatomes bilateral lower extremities      _*_*_*_*_*_*_*_*_*_*_*_*_*_*_*_*_*_*_*_*_*_*_*_*_*_*_*_*_*_*_*_*_*_*_*_*_*_*_*_*_*    Assessment:  88 y o male with a T9 chance fracture without neuro deficit    Plan:   · Weight-bearing as tolerated bilateral lower extremities in TLSO brace  · Non op mgmt   · Analgesics  · DVT per primary  · PT OT  · F/u Dr Nkechi Rehman in 2 weeks       Shania Dodd MD

## 2017-12-15 NOTE — SOCIAL WORK
Tentative transport set for 12/16 @1300 to [facility] via [place] [facility]   Pt and his family aware

## 2017-12-15 NOTE — PLAN OF CARE
Problem: OCCUPATIONAL THERAPY ADULT  Goal: Performs self-care activities at highest level of function for planned discharge setting  See evaluation for individualized goals  Treatment Interventions: ADL retraining, Functional transfer training, Endurance training, Patient/family training, Cognitive reorientation, Equipment evaluation/education, Compensatory technique education, Activityengagement          See flowsheet documentation for full assessment, interventions and recommendations  Limitation: Decreased ADL status, Decreased UE strength, Decreased Safe judgement during ADL, Decreased endurance, Decreased self-care trans  Prognosis: Good  Assessment: Pt is a 80 y o  male who was admitted to [facility] on 12/13/2017 with SDH, T9 chance fx, T8-T9 possible ligamentous injury, L1-L2 acute on chronic fx   Pt's problem list also includes PMH of HTN, previous surgery, cancer history, neuropathy and hld, hypothyroid, insomnia, osteoarthritis, sepsis, renal failure, sdh, edh, anemia, bph, cellulitis, dyslipidemia, gerd, hip fx, insomnia  At baseline pt was completing adls with assist from facility staff  [facility]  Currently pt requires max assist for overall ADLS and mod a x 2 for functional mobility/transfers  Pt currently presents with impairments in the following categories -difficulty performing ADLS, difficulty performing IADLS  and health management  activity tolerance, endurance, standing balance/tolerance, memory and safety   These impairments, as well as pt's fatigue, pain, spinal precautions and risk for falls  limit pt's ability to safely engage in all baseline areas of occupation, includingeating, grooming, bathing, dressing, toileting, functional mobility/transfers, social participation  and leisure activities  From OT standpoint, recommend inpt rehab upon D/C  OT will continue to follow to address the below stated goals        OT Discharge Recommendation: Short Term Rehab

## 2017-12-16 VITALS
WEIGHT: 177.69 LBS | HEIGHT: 74 IN | DIASTOLIC BLOOD PRESSURE: 90 MMHG | BODY MASS INDEX: 22.8 KG/M2 | HEART RATE: 62 BPM | OXYGEN SATURATION: 99 % | SYSTOLIC BLOOD PRESSURE: 169 MMHG | TEMPERATURE: 97.5 F | RESPIRATION RATE: 18 BRPM

## 2017-12-16 RX ORDER — METHOCARBAMOL 500 MG/1
500 TABLET, FILM COATED ORAL EVERY 8 HOURS SCHEDULED
Qty: 5 TABLET | Refills: 0 | Status: SHIPPED | OUTPATIENT
Start: 2017-12-16 | End: 2018-02-06

## 2017-12-16 RX ORDER — OXYCODONE HYDROCHLORIDE 5 MG/1
2.5 TABLET ORAL EVERY 4 HOURS PRN
Qty: 5 TABLET | Refills: 0 | Status: SHIPPED | OUTPATIENT
Start: 2017-12-16 | End: 2017-12-26

## 2017-12-16 RX ORDER — ACETAMINOPHEN 325 MG/1
650 TABLET ORAL EVERY 6 HOURS PRN
Qty: 30 TABLET | Refills: 0 | Status: SHIPPED | OUTPATIENT
Start: 2017-12-16 | End: 2020-06-10 | Stop reason: HOSPADM

## 2017-12-16 RX ORDER — FUROSEMIDE 20 MG/1
10 TABLET ORAL DAILY
Refills: 0 | Status: CANCELLED | OUTPATIENT
Start: 2017-12-17

## 2017-12-16 RX ORDER — GABAPENTIN 100 MG/1
100 CAPSULE ORAL
Qty: 5 CAPSULE | Refills: 0 | Status: SHIPPED | OUTPATIENT
Start: 2017-12-16 | End: 2020-06-10 | Stop reason: HOSPADM

## 2017-12-16 RX ADMIN — METHOCARBAMOL 500 MG: 500 TABLET ORAL at 05:45

## 2017-12-16 RX ADMIN — ACETAMINOPHEN 975 MG: 325 TABLET, FILM COATED ORAL at 05:43

## 2017-12-16 RX ADMIN — METHOCARBAMOL 500 MG: 500 TABLET ORAL at 13:03

## 2017-12-16 RX ADMIN — FUROSEMIDE 10 MG: 20 TABLET ORAL at 09:39

## 2017-12-16 RX ADMIN — OXYCODONE HYDROCHLORIDE 2.5 MG: 5 TABLET ORAL at 03:18

## 2017-12-16 RX ADMIN — FINASTERIDE 5 MG: 5 TABLET, FILM COATED ORAL at 09:38

## 2017-12-16 RX ADMIN — LIDOCAINE 2 PATCH: 50 PATCH TOPICAL at 09:40

## 2017-12-16 RX ADMIN — ACETAMINOPHEN 975 MG: 325 TABLET, FILM COATED ORAL at 13:03

## 2017-12-16 NOTE — DISCHARGE SUMMARY
Discharge Summary - Zachary Perez 80 y o  male MRN: [id number]    [facility] Encounter: [id number]    Admission Date: 12/13/2017     Admitting Diagnosis: Subdural hematoma ([address] ) [I62 00]  Unspecified multiple injuries, initial encounter [T07  XXXA]    HPI: Zachary Perez is a 80 y o  male who presents to the trauma service status post fall  Patient states he fell while getting out of a recliner  Patient states he fell into a heater that is next to his chair  Patient was taken to the [facility] or use found to have fever body fractures in addition to a subarachnoid hemorrhage  Patient takes aspirin daily  Procedures Performed: No orders of the defined types were placed in this encounter  Hospital Course:  Patient was admitted under hot protocol, for left-sided subarachnoid hemorrhage, T9 vertebral body fracture, under fracture and chronic L1-L2 fracture  orthopedic Spine and Neurosurgery were consulted  As repeat CT of head was stable no planned surgical intervention, will follow up in office outpatient 2 weeks after repeat CT scan  Orthopedic Spine surgery evaluated the patient after conversation with the patient's family decided against operative intervention for lumbar fractures  Instead recommended TLSO brace  The patient stable and ready for discharge back to [facility]  Significant Findings, Care, Treatment and Services Provided:   Chronic L1/L2 fractures:  TLSO brace  Rib fracture:  Rib fracture protocol  By frontal subdural hematoma:  Bleeding stable, no operative intervention    Complications:  None    Discharge Diagnosis:  L1-L2 fracture, subdural hematoma, rib fracture    Condition at Discharge: good     Discharge instructions/Information to patient and family:   See after visit summary for information provided to patient and family        Provisions for Follow-Up Care:  See after visit summary for information related to follow-up care and any pertinent

## 2017-12-16 NOTE — PROGRESS NOTES
Cardiology Progress Note - Barbara Velasco 80 y o  male MRN: [id number]    Unit/Bed#: [facility] 934-01 Encounter: [id number]      Assessment:  Active Problems:    Subarachnoid bleed (HCC)   Atrial fibrillation  Plan:  Patient has no chest pain or significant dyspnea  He had no major issues overnight  He is to be managed non operatively for his T9 fracture  I have made no change today in his medical regimen  Subjective:   Patient seen and examined  No significant events overnight   negative  Objective:     Vitals: Blood pressure 136/83, pulse 63, temperature 97 9 °F (36 6 °C), temperature source Oral, resp  rate 18, height 6' 2" (1 88 m), weight 80 6 kg (177 lb 11 1 oz), SpO2 99 %  , Body mass index is 22 81 kg/m² , Orthostatic Blood Pressures    Flowsheet Row Most Recent Value   Blood Pressure  136/83 filed at 12/15/2017 2357   Patient Position - Orthostatic VS  Lying filed at 12/15/2017 2357      ,      Intake/Output Summary (Last 24 hours) at 12/16/17 0719  Last data filed at 12/16/17 0701   Gross per 24 hour   Intake              580 ml   Output              550 ml   Net               30 ml             Physical Exam:    GEN: Barbara Velasco appears well, alert and oriented x 3, pleasant and cooperative   HEART: normal rate, irregular rhythm, normal S1 and S2, no murmurs, clicks, gallops or rubs   LUNGS: clear to auscultation bilaterally; no wheezes, rales, or rhonchi   ABDOMEN: normal bowel sounds, soft, no tenderness, no distention  EXTREMITIES: peripheral pulses normal; no clubbing, cyanosis, or edema  SKIN: warm and well perfused, no suspicious lesions on exposed skin    Labs & Results:    Admission on 12/13/2017   Component Date Value    Protime 12/14/2017 15 8*    INR 12/14/2017 1 25*    PTT 12/14/2017 32     Sodium 12/14/2017 140     Potassium 12/14/2017 3 9     Chloride 12/14/2017 107     CO2 12/14/2017 26     Anion Gap 12/14/2017 7     BUN 12/14/2017 21     Creatinine 12/14/2017 1 15     Glucose 12/14/2017 135     Calcium 12/14/2017 8 2*    AST 12/14/2017 12     ALT 12/14/2017 13     Alkaline Phosphatase 12/14/2017 51     Total Protein 12/14/2017 6 8     Albumin 12/14/2017 3 2*    Total Bilirubin 12/14/2017 1 25*    eGFR 12/14/2017 57     WBC 12/14/2017 8 19     RBC 12/14/2017 4 38     Hemoglobin 12/14/2017 13 3     Hematocrit 12/14/2017 39 8     MCV 12/14/2017 91     MCH 12/14/2017 30 4     MCHC 12/14/2017 33 4     RDW 12/14/2017 14 2     MPV 12/14/2017 10 7     Platelets 35/90/8895 119*    nRBC 12/14/2017 0     Neutrophils Relative 12/14/2017 78*    Lymphocytes Relative 12/14/2017 10*    Monocytes Relative 12/14/2017 12     Eosinophils Relative 12/14/2017 0     Basophils Relative 12/14/2017 0     Neutrophils Absolute 12/14/2017 6 38     Lymphocytes Absolute 12/14/2017 0 84     Monocytes Absolute 12/14/2017 0 96     Eosinophils Absolute 12/14/2017 0 00     Basophils Absolute 12/14/2017 0 00     Ventricular Rate 12/14/2017 58     Atrial Rate 12/14/2017 416     QRSD Interval 12/14/2017 90     QT Interval 12/14/2017 416     QTC Interval 12/14/2017 408     QRS Axis 12/14/2017 6     T Wave Axis 12/14/2017 34     Sodium 12/15/2017 139     Potassium 12/15/2017 3 8     Chloride 12/15/2017 106     CO2 12/15/2017 26     Anion Gap 12/15/2017 7     BUN 12/15/2017 22     Creatinine 12/15/2017 1 01     Glucose 12/15/2017 110     Calcium 12/15/2017 8 1*    AST 12/15/2017 13     ALT 12/15/2017 12     Alkaline Phosphatase 12/15/2017 45*    Total Protein 12/15/2017 6 3*    Albumin 12/15/2017 2 7*    Total Bilirubin 12/15/2017 1 09*    eGFR 12/15/2017 66     Bilirubin, Direct 12/15/2017 0 34*       Ct Head Wo Contrast    Result Date: 12/14/2017  Narrative: CT BRAIN - WITHOUT CONTRAST INDICATION:  Trauma COMPARISON:  12/13/2017 TECHNIQUE:  CT examination of the brain was performed    In addition to axial images, coronal reformatted images were created and submitted for interpretation  Radiation dose length product (DLP) for this visit:  1134 15 mGy-cm   This examination, like all CT scans performed in the [facility], was performed utilizing techniques to minimize radiation dose exposure, including the use of iterative reconstruction and automated exposure control  IMAGE QUALITY:  Diagnostic  FINDINGS:  PARENCHYMA:  Decreased attenuation is noted in the supratentorial white matter demonstrating an appearance most consistent with moderate microangiopathic change  No intracranial mass, mass effect or midline shift  No CT signs of acute infarction  Subdural hemorrhage is noted along the dorsal superior falx best seen on 2/34 measuring up to 5 mm in thickness without significant mass effect or midline shift  There is also sulcal hemorrhage noted within the left parietal lobe best seen on series 400 image 81 with minimal mass effect  There is an adjacent subdural hematoma left posterior parietal region measuring 2 to 3 mm in thickness without significant mass effect or midline shift  Small right parafalcine subdural hematoma on 2/38 measuring 2 mm in thickness without mass effect or midline shift  Left frontal subdural hematoma on 2/30, stable  Right frontal subdural hematoma on 2/31 is stable  Tiny right temporal lobe subdural hematoma on 2/29 without significant mass effect or midline shift  VENTRICLES AND EXTRA-AXIAL SPACES:  Age-appropriate volume loss is noted  No hydrocephalus  VISUALIZED ORBITS AND PARANASAL SINUSES:  Unremarkable  CALVARIUM AND EXTRACRANIAL SOFT TISSUES:   Normal      Impression: 1  Bifrontal subdural hematomas, parafalcine subdural hematomas, left posterior parietal subdural and subarachnoid hematomas all appear stable  There is also a right temporal lobe small subdural hematoma without significant mass effect or midline shift  that is likely stable in retrospect   2   Cortical atrophy with microangiopathic changes noted as before  I personally discussed this study with Dr Nuria Groves on 12/14/2017 12:47 PM  Workstation performed: [id number]     Ct Head Without Contrast    Result Date: 12/13/2017  Narrative: CT BRAIN - WITHOUT CONTRAST INDICATION:  Headache and head trauma  COMPARISON:  6/2/2017  TECHNIQUE:  CT examination of the brain was performed  In addition to axial images, coronal reformatted images were created and submitted for interpretation  Radiation dose length product (DLP) for this visit:  1136 mGy-cm   This examination, like all CT scans performed in the [facility], was performed utilizing techniques to minimize radiation dose exposure, including the use of iterative reconstruction and automated exposure control  IMAGE QUALITY:  Diagnostic  FINDINGS:  PARENCHYMA:  Punctate focus of acute subarachnoid hemorrhage over the left parietal convexity  Possible tiny foci of subdural hemorrhage along the posterior falx measuring up to 3 mm in thickness  Minimal subdural hemorrhage along the anterior inferior aspect of the falx measuring up to 3 mm  Adjacent bilateral anterior inferior frontal subdural hemorrhage, also measuring up to 3 mm in thickness  Few punctate foci of acute subdural hemorrhage over the anterior inferior frontal region bilaterally  Periventricular and subcortical white matter lesions, consistent with microangiopathic disease  No intracranial mass, mass effect or midline shift  No CT signs of acute infarction  VENTRICLES AND EXTRA-AXIAL SPACES:  Normal for patient's age  VISUALIZED ORBITS AND PARANASAL SINUSES:  Unremarkable  CALVARIUM AND EXTRACRANIAL SOFT TISSUES:  No evidence of acute calvarial fracture or soft tissue hematoma        Impression: Small foci of subdural and subarachnoid hemorrhage over the left parietal convexity, bilateral anterior-inferior frontal region and along the falx  No mass effect   Workstation performed: [id number]     Ct Spine Cervical Wo Contrast    Result Date: 12/14/2017  Narrative: CT CERVICAL SPINE - WITHOUT CONTRAST INDICATION: Trauma COMPARISON: 1/22/2014 TECHNIQUE:  CT examination of the cervical spine was performed without intravenous contrast   Contiguous axial images were obtained  Sagittal and coronal reconstructions were performed  Radiation dose length product (DLP) for this visit:  434 57 mGy-cm   This examination, like all CT scans performed in the [facility], was performed utilizing techniques to minimize radiation dose exposure, including the use of iterative  reconstruction and automated exposure control  IMAGE QUALITY:  Diagnostic  FINDINGS: ALIGNMENT:  Moderate cervical levoscoliosis identified  Minimal C7 on T1 anterolisthesis related to facet arthropathy  VERTEBRAL BODIES:  No fracture  DEGENERATIVE CHANGES:  Severe multilevel cervical degenerative changes are noted  No critical central canal stenosis  PREVERTEBRAL AND PARASPINAL SOFT TISSUES: Normal         THORACIC INLET:  Mild biapical pleural parenchymal scarring noted  Impression: No cervical spine fracture or traumatic malalignment  Workstation performed: [id number]     Mri Thoracic Spine Wo Contrast    Result Date: 12/15/2017  Narrative: MRI THORACOLUMBAR SPINE WO CONTRAST INDICATION:  T8-T9 fracture evaluation COMPARISON:  CT performed on 12/13/2017  MRI performed on 8/6/2012  TECHNIQUE:  Sagittal T1, sagittal T2, sagittal inversion recovery, axial T2,  axial 2D MERGE  IMAGE QUALITY: Diagnostic  FINDINGS: Counting reference:  Lumbosacral Junction  For the purposes of this report, L4-5 is considered the level of the iliac crest  ALIGNMENT: There is a lumbar levoscoliosis with leftward subluxation of the L4 vertebral body relative to L3 and L5  Compensatory dextrocurvature at the thoracolumbar junction     Slight kyphotic deformity also noted at the thoracolumbar junction  MARROW SIGNAL:  The overall marrow does not appear replaced  However, extensive bony degenerative changes are noted  Again noted is the fluid cleft with distraction injury involving the mid to two thirds posterior aspect of the T9 vertebral body  There is surrounding paravertebral edema  The fracture appears to violate both the T8-T9 and T9-T10 interspace with widening of the T8-T9 interspace as well  There is no clear extension of fluid signal or edema signal into the pedicles or posterior elements  The articular pillars appear grossly maintained  There is no evidence for an associated epidural collection within the limitations of this examination  There is a chronic compression fracture of the superior endplate of L1  There is mild persistent fluid signal throughout the central portion of the vertebral body  When compared to the 6/16/2016 examination, there has been mild interval height loss, approximately one third,, not necessarily unexpected given the stress point of the curvature  No additional fractures are identified  THORACIC CORD: Within the distal visualized thoracic cord, there is no evidence for cord signal abnormality  No areas of T2 hypointensity to suggest blood product deposition  DWI signal does demonstrate increased T2 VI signal within the conus  However, no associated cord edema and given the absence of clinical symptoms, unlikely to reflect true infarction  PARAVERTEBRAL SOFT TISSUES:  Paravertebral soft tissue swelling is noted  There is atrophy of the paraspinal musculature  Bilateral perinephric stranding  Renal cysts  DEGENERATIVE CHANGE: T9-T10: Dorsal bony hypertrophy  No significant spinal canal stenosis  Moderate right and moderate left neural foraminal stenosis  T10-T11: No significant spinal canal stenosis  No right and no left neural foraminal stenosis  T11-T12: Dorsal bony hypertrophy  Minimal flattening of the ventral cord  However, no substantial spinal canal stenosis  No right and no left neural foraminal stenosis  T12-L1: There is a bony hypertrophy with facet arthropathy  No significant spinal canal stenosis  No right and mild left neural foraminal stenosis  L1-L2: Circumferential disc bulge  Bilateral facet arthropathy, left greater than right  Mild spinal canal stenosis  No right and mild left neural foraminal stenosis  L2-L3: Circumferential disc bulge secondary to the curvature which appears eccentric to the left  Left far lateral osteophytes  Bilateral facet arthropathy  Mild spinal canal stenosis  Mild right and moderate left neural foraminal stenosis  L3-L4: Bilateral facet arthropathy with circumferential disc bulge  Mild spinal canal stenosis  Moderate right and mild left neural foraminal stenosis  L4-L5: Uncovering of the disc secondary to the anterolisthesis with bilateral facet arthropathy and ligament flavum thickening  Presumed large osteophyte extending from the facet given its preservation of marrow signal within the right dorsolateral epidural space versus a large synovial cyst   Moderate spinal canal stenosis  Moderate to severe right and moderate left neural foraminal stenosis  L5-S1: No significant spinal canal stenosis  No right and no left neural foraminal stenosis  Impression: T9 vertebral body distraction fracture with associated paravertebral hematoma  No extension to the posterior elements or signs of epidural hematoma  No clear evidence for cord signal abnormality or cord injury  Progression of L1 compression fracture with approximately one third height loss, no bony retropulsion  Multilevel spondylosis as detailed level by level  Workstation performed: [id number]     Ct Spine Thoracic & Lumbar Wo Contrast    Result Date: 12/13/2017  Narrative: CT THORACIC AND LUMBAR SPINE INDICATION:  Back pain and trauma  COMPARISON:  6/16/2016 TECHNIQUE:  Contiguous axial images were obtained  Sagittal and coronal reconstructions were performed    Radiation dose length product (DLP) for this visit:  261 6022 2691 mGy-cm   This examination, like all CT scans performed in the [facility], was performed utilizing techniques to minimize radiation dose exposure, including the use of iterative reconstruction and automated exposure control  IMAGE QUALITY:  Diagnostic  FINDINGS: ALIGNMENT:  Stable thoracic dextroscoliosis and lower lumbar levoscoliosis  VERTEBRAL BODIES: There is widening of the anterior T8-9 disc space measuring 17 mm  There is an oblique fracture through the T9 vertebral body  Posterior elements are grossly intact  There is mild widening of the left T8-9 facet suggesting capsular injury  No subluxation  There is a chronic L1 fracture  Anterolisthesis seen within the vertebral body may represent a partially healed fracture versus superimposed acute fracture  There is a chronic L2 fracture, also with subtle linear lucency, concerning for superimposed acute fracture  Fracture of the left T9 rib at the costovertebral junction  DEGENERATIVE CHANGES:  There is severe loss of disc height, endplate sclerosis and posterior disc osteophyte complexes in the lower thoracic and lumbar spine  Mild anterolisthesis of L4 is likely secondary to chronic disc and facet degenerative change severe bilateral bony neural foraminal narrowing at L4-5  Multilevel mild to moderate central canal stenosis in the lower thoracic and lumbar spine  PARASPINAL SOFT TISSUES:      No paraspinal hematoma  Trace amount of pleural fluid bilaterally  Impression: 1  Distracted T9 fracture with widening of the anterior T8-9 disc space, compatible with disc and ligamentous injury  Widening of the left T8-9 facet suggesting capsular injury  2  Chronic L1 and L2 fractures with possible superimposed acute fractures  MRI may be helpful for further evaluation  3  Fracture of the left T9 rib with the costovertebral junction      Workstation performed: [id number]       EKG personally reviewed by Shannan Rose MD  Counseling / Coordination of Care  Total floor / unit time spent today 30 minutes  Greater than 50% of total time was spent with the patient and / or family counseling and / or coordination of care

## 2017-12-16 NOTE — PROGRESS NOTES
Progress Note - Trauma   Kaushik Chu 80 y o  male MRN: [id number]  Unit/Bed#: [facility] 934-01 Encounter: [id number]    Assessment:   L SAH/SDH  T9 fracture with widening of T8 to T9 disc space  Chronic L1/L2 fractures  History of atrial fibrillation    Plan:   L SAH/SDH   -cleared by Neurosurgery  Recommend follow-up in 2 weeks        T9 fracture with widening of T8 to T9 disc    -orthopedics opting for non operative management at this time  Continue TLSO brace       Left 9th rib fracture at costovertebral junction   -analgesia,  IS/Pulmonary toilette       Proph- SCDs, hold chemical prophylaxis due to [address] and      Disp- Placement pending     Chief Complaint: back pain    Subjective:     Objective:     Meds/Allergies   Prescriptions Prior to Admission   Medication Sig Dispense Refill Last Dose    acetaminophen (TYLENOL) 325 mg tablet Take 650 mg by mouth 2 (two) times a day  4/6/2017 at Unknown time    aspirin 81 mg chewable tablet Chew 81 mg daily  4/6/2017 at Unknown time    Cholecalciferol (VITAMIN D3) 2000 UNITS TABS Take 2,000 Units by mouth daily  4/6/2017 at Unknown time    cyanocobalamin (VITAMIN B-12) 1,000 mcg tablet Take 1,000 mcg by mouth daily  4/6/2017 at Unknown time    finasteride (PROSCAR) 5 mg tablet Take 5 mg by mouth daily  4/6/2017 at Unknown time    furosemide (LASIX) 20 mg tablet Take 20 mg by mouth every morning Indications: take 1/2 tab    4/6/2017 at Unknown time    magnesium hydroxide (MILK OF MAGNESIA) 400 mg/5 mL oral suspension Take 30 mL by mouth daily as needed for constipation       sodium chloride (OCEAN) 0 65 % nasal spray 2 sprays into each nostril 3 (three) times a day  4/6/2017 at Unknown time    Tamsulosin HCl (FLOMAX PO) Take 0 4 mg by mouth daily at bedtime  4/5/2017 at Unknown time       Vitals: Blood pressure 136/83, pulse 63, temperature 97 9 °F (36 6 °C), temperature source Oral, resp   rate 18, height 6' 2" (1 88 m), weight 80 6 kg (177 lb 11 1 oz), SpO2 99 %  Body mass index is 22 81 kg/m²  SpO2: SpO2: 99 %    ABG: No results found for: PHART, DCP8EBK, PO2ART, BIV0EWY, L1JSQHES, BEART, SOURCE      Intake/Output Summary (Last 24 hours) at 12/16/17 0522  Last data filed at 12/16/17 3710   Gross per 24 hour   Intake              580 ml   Output              450 ml   Net              130 ml       Invasive Devices     Peripheral Intravenous Line            Peripheral IV 12/13/17 Right Antecubital 2 days                Nutrition/GI Proph/Bowel Reg: reg    Physical Exam:   GENERAL APPEARANCE: NAD   HEENT: WNL   CV: WNL   LUNGS: CTA b/l   ABD: Soft and Non tender  EXT: Tenderness to palpation mid/lower thoracic spine, mild tenderness palpation along lumbar spine  NEURO: WNL  SKIN: WNL      Lab Results: Results: I have personally reviewed pertinent reports  Imaging/EKG Studies: Results: I have personally reviewed pertinent reports      Other Studies:   VTE Prophylaxis:

## 2017-12-26 ENCOUNTER — GENERIC CONVERSION - ENCOUNTER (OUTPATIENT)
Dept: OTHER | Facility: OTHER | Age: 82
End: 2017-12-26

## 2018-01-02 ENCOUNTER — GENERIC CONVERSION - ENCOUNTER (OUTPATIENT)
Dept: OTHER | Facility: OTHER | Age: 83
End: 2018-01-02

## 2018-01-02 ENCOUNTER — HOSPITAL ENCOUNTER (OUTPATIENT)
Dept: CT IMAGING | Facility: HOSPITAL | Age: 83
Discharge: HOME | End: 2018-01-02
Attending: NEUROLOGICAL SURGERY
Payer: MEDICARE

## 2018-01-02 DIAGNOSIS — I60.9 SUBARACHNOID BLEED (HCC): ICD-10-CM

## 2018-01-02 DIAGNOSIS — S06.5X9A SUBDURAL HEMATOMA (HCC): ICD-10-CM

## 2018-01-02 PROCEDURE — 70450 CT HEAD/BRAIN W/O DYE: CPT

## 2018-01-11 ENCOUNTER — GENERIC CONVERSION - ENCOUNTER (OUTPATIENT)
Dept: OTHER | Facility: OTHER | Age: 83
End: 2018-01-11

## 2018-01-11 NOTE — MISCELLANEOUS
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)   2  Acquired Deformity Of Ankle (736 70)   3  Acute pain of right shoulder (719 41) (M25 511)   4  Ambulatory dysfunction (719 7) (R26 2)   5  Anemia of chronic disease (285 29) (D63 8)   6  Atrial fibrillation (427 31) (I48 91)   7  Benign prostatic hypertrophy without urinary obstruction (600 00) (N40 0)   8  Bilateral subdural hematomas (432 1)   9  Chronic pain (338 29) (G89 29)   10  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)   11  Closed Fracture Of Neck Of Femur - Base Of Neck (820 03)   12  Depression (311) (F32 9)   13  Dyslipidemia (272 4) (E78 5)   14  Folic Acid Deficiency Anemia, Dietary (281 2)   15  Gastroesophageal reflux disease (530 81) (K21 9)   16  Hyperlipidemia (272 4) (E78 5)   17  Hypertension (401 9) (I10)   18  Hypothyroidism (244 9) (E03 9)   19  Kidney Injury (866 00)   20  Laceration Of Lower Leg (891 0)   21  Leg edema (782 3) (R60 0)   22  Localized primary osteoarthritis of right shoulder region (715 11) (M19 011)   23  Mild cognitive impairment (331 83) (G31 84)   24  Orthopedic Aftercare For Healing Traumatic Fracture Hip (V54 13)   25  Osteoarthritis (715 90) (M19 90)   26  Peripheral neuropathy (356 9) (G62 9)   27  Shoulder pain (719 41) (M25 519)   28  Subdural hematoma (432 1) (I62 00)   29  Tear of skin of left buttock, initial encounter (877 0) (S31 821A)   30  Tear of skin of left buttock, subsequent encounter (V58 89,877 0) (S31 821D)   31  Vitamin D deficiency (268 9) (E55 9)    Current Meds   1  Acetaminophen 325 MG Oral Tablet; 2 tab for every 4 hrs prn; Therapy: (Recorded:14Oct2015) to Recorded   2  Ammonium Lactate 12 % External Cream;   Therapy: (Recorded:14Oct2015) to Recorded   3  Bacitracin Zinc 500 UNIT/GM External Ointment; Therapy: (Recorded:14Oct2015) to Recorded   4  Clotrimazole-Betamethasone CREA; Therapy: (Recorded:14Oct2015) to Recorded   5  Deep Sea Nasal Spray 0 65 % Nasal Solution; inhale 2 spray into each nostril 3 x daily; Therapy: (Recorded:14Oct2015) to Recorded   6  DermaCerin External Cream;   Therapy: (Recorded:14Oct2015) to Recorded   7  Diltiazem HCl ER Coated Beads 180 MG Oral Capsule Extended Release 24 Hour; Therapy: (Recorded:00Chk9086) to Recorded   8  Finasteride 5 MG Oral Tablet; TAKE 1 TABLET DAILY; Therapy: (Recorded:14Oct2015) to Recorded   9  Furosemide 20 MG Oral Tablet; TAKE 1/2 TABLET DAILY; Therapy: (Recorded:14Oct2015) to Recorded   10  Pantoprazole Sodium 20 MG Oral Tablet Delayed Release; TAKE 1 TABLET DAILY; Therapy: (Recorded:14Oct2015) to Recorded   11  Tamsulosin HCl - 0 4 MG Oral Capsule; 1 po qhs;    Therapy: [id number] to Recorded   12  Tramadol-Acetaminophen 37 5-325 MG Oral Tablet; TAKE 1 TABLET EVERY 12 HOURS    AS NEEDED; Therapy: (Recorded:14Oct2015) to Recorded   13  Triple Antibiotic OINT; Therapy: (Recorded:14Oct2015) to Recorded   14  Vitamin B-12 1000 MCG Oral Tablet; TAKE 1 TABLET DAILY AS DIRECTED; Therapy: (Recorded:64Tac9326) to Recorded   15  Vitamin D3 2000 UNIT Oral Capsule; Take one tablet daily; Therapy: (Recorded:14Oct2015) to Recorded    Allergies    1   No Known Drug Allergies    Signatures   Electronically signed by : Ag Medina RN; Mar 30 2016  8:48AM EST                       (Author)

## 2018-01-13 VITALS
BODY MASS INDEX: 24.02 KG/M2 | DIASTOLIC BLOOD PRESSURE: 72 MMHG | WEIGHT: 177.31 LBS | HEIGHT: 72 IN | HEART RATE: 58 BPM | SYSTOLIC BLOOD PRESSURE: 120 MMHG

## 2018-01-13 NOTE — PROGRESS NOTES
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)    Discussion/Summary      Elderly male s/p Fall with chronic SDH and anticoagulation deficiency  Here for a follow up evaluation and is doing remarkably well  Referred back to cardiologist for coagulation therapy, Neurosurgery suggested no more anticoagulants, daugther concerned so I referred her back to Dr Tyler [facility]  Ambulating with walker, rest of physical exam intact, no complaints  No further follow up with trauma at this time  Will follow up with Wound center in 1 week for stage 2 decub from his last hospital stay  Chief Complaint  Chief Complaint Free Text Note Form: f/u fall / brain bleed      History of Present Illness  HPI: S/P Fall with acute on chronic bleed ( also on Xaraleto)      Review of Systems  Complete-Male:   Constitutional: No fever or chills, feels well, no tiredness, no recent weight gain or weight loss  Eyes: No complaints of eye pain, no red eyes, no discharge from eyes, no itchy eyes  ENT: no complaints of earache, no hearing loss, no nosebleeds, no nasal discharge, no sore throat, no hoarseness  Cardiovascular: No complaints of slow heart rate, no fast heart rate, no chest pain, no palpitations, no leg claudication, no lower extremity  Respiratory: No complaints of shortness of breath, no wheezing, no cough, no SOB on exertion, no orthopnea or PND  Gastrointestinal: No complaints of abdominal pain, no constipation, no nausea or vomiting, no diarrhea or bloody stools  Genitourinary: No complaints of dysuria, no incontinence, no hesitancy, no nocturia, no genital lesion, no testicular pain  Musculoskeletal: No complaints of arthralgia, no myalgias, no joint swelling or stiffness, no limb pain or swelling     Integumentary: stage II on buttock from last hospitalization  Neurological: No compliants of headache, no confusion, no convulsions, no numbness or tingling, no dizziness or fainting, no limb weakness, no difficulty walking  Psychiatric: Is not suicidal, no sleep disturbances, no anxiety or depression, no change in personality, no emotional problems  Endocrine: No complaints of proptosis, no hot flashes, no muscle weakness, no erectile dysfunction, no deepening of the voice, no feelings of weakness  Hematologic/Lymphatic: No complaints of swollen glands, no swollen glands in the neck, does not bleed easily, no easy bruising  Active Problems    1  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)   2  Acquired Deformity Of Ankle (736 70)   3  Acute pain of right shoulder (719 41) (M25 511)   4  Ambulatory dysfunction (719 7) (R26 2)   5  Anemia of chronic disease (285 29) (D63 8)   6  Atrial fibrillation (427 31) (I48 91)   7  Benign prostatic hypertrophy without urinary obstruction (600 00) (N40 0)   8  Bilateral subdural hematomas (432 1)   9  Chronic pain (338 29) (G89 29)   10  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)   11  Closed Fracture Of Neck Of Femur - Base Of Neck (820 03)   12  Depression (311) (F32 9)   13  Dyslipidemia (272 4) (E78 5)   14  Folic Acid Deficiency Anemia, Dietary (281 2)   15  Gastroesophageal reflux disease (530 81) (K21 9)   16  Hyperlipidemia (272 4) (E78 5)   17  Hypertension (401 9) (I10)   18  Hypothyroidism (244 9) (E03 9)   19  Kidney Injury (866 00)   20  Laceration Of Lower Leg (891 0)   21  Leg edema (782 3) (R60 0)   22  Localized primary osteoarthritis of right shoulder region (715 11) (M19 011)   23  Mild cognitive impairment (331 83) (G31 84)   24  Orthopedic Aftercare For Healing Traumatic Fracture Hip (V54 13)   25  Osteoarthritis (715 90) (M19 90)   26  Peripheral neuropathy (356 9) (G62 9)   27  Shoulder pain (719 41) (M25 519)   28  Subdural hematoma (432 1) (I62 00)   29   Vitamin D deficiency (268 9) (E55 9)    Past Medical History    1  History of Cellulitis (682 9) (L03 90)   2  History of Fall, initial encounter (E888 9) [facility])   3  History of atrial fibrillation (V12 59) (Z86 79)   4  History of bacteremia (V12 09) (Z87 898)   5  History of candidiasis of mouth (V12 09) (Z86 19)   6  History of sinus bradycardia (V12 59) (Z86 79)   7  History of Iron deficiency anemia due to chronic blood loss (280 0) (D50 0)   8  History of Spinal Epidural Abscess (324 1)   9  History of Thrombophlebitis (451 9) (I80 9)    Surgical History    1  History of Inguinal Hernia Repair   2  Orthopedic Aftercare For Healing Traumatic Fracture Hip (V54 13)  Surgical History Reviewed: The surgical history was reviewed and updated today  Family History    1  Family history of Coronary Artery Disease (V17 49)   2  Family history of Renal Disease  Family History Reviewed: The family history was reviewed and updated today  Social History    · Former smoker (L35 45) (F25 761)   · Denied: History of Never Drank Alcohol  Social History Reviewed: The social history was reviewed and updated today  The social history was reviewed and is unchanged  Current Meds   1  Acetaminophen 325 MG Oral Tablet; 2 tab for every 4 hrs prn; Therapy: (Recorded:14Oct2015) to Recorded   2  Ammonium Lactate 12 % External Cream;   Therapy: (Recorded:14Oct2015) to Recorded   3  Bacitracin Zinc 500 UNIT/GM External Ointment; Therapy: (Recorded:14Oct2015) to Recorded   4  Clotrimazole-Betamethasone CREA; Therapy: (Recorded:14Oct2015) to Recorded   5  Deep Sea Nasal Spray 0 65 % Nasal Solution; inhale 2 spray into each nostril 3 x daily; Therapy: (Recorded:14Oct2015) to Recorded   6  DermaCerin External Cream;   Therapy: (Recorded:14Oct2015) to Recorded   7  Finasteride 5 MG Oral Tablet; TAKE 1 TABLET DAILY; Therapy: (Recorded:14Oct2015) to Recorded   8  Furosemide 20 MG Oral Tablet; TAKE 1/2 TABLET DAILY;    Therapy: (Recorded:14Toa9235) to Recorded   9  Pantoprazole Sodium 20 MG Oral Tablet Delayed Release; TAKE 1 TABLET DAILY; Therapy: (Recorded:14Oct2015) to Recorded   10  Tamsulosin HCl - 0 4 MG Oral Capsule; 1 po qhs;    Therapy: 13REI5823 to Recorded   11  Taztia  MG Oral Capsule Extended Release 24 Hour; Therapy: (Recorded:14Oct2015) to Recorded   12  Tramadol-Acetaminophen 37 5-325 MG Oral Tablet; TAKE 1 TABLET EVERY 12 HOURS    AS NEEDED; Therapy: (Recorded:14Oct2015) to Recorded   13  Triple Antibiotic OINT; Therapy: (Recorded:14Oct2015) to Recorded   14  Vitamin B-12 1000 MCG Oral Tablet; TAKE 1 TABLET DAILY AS DIRECTED; Therapy: (Recorded:59Cbx3380) to Recorded   15  Vitamin D3 2000 UNIT Oral Capsule; Take one tablet daily; Therapy: (Recorded:14Oct2015) to Recorded   16  Xarelto 20 MG Oral Tablet; One tablet daily; Therapy: (Recorded:14Oct2015) to Recorded  Medication List Reviewed: The medication list was reviewed and updated today  Allergies    1  No Known Drug Allergies    Vitals  Signs [Data Includes: Current Encounter]   Recorded: [id number] 01:39PM   Temperature: 68 1 F  Systolic: 338  Diastolic: 60  Height: 6 ft   Weight: 206 lb   BMI Calculated: 27 94  BSA Calculated: 2 16    Physical Exam    Constitutional   General appearance: No acute distress, well appearing and well nourished  Eyes   Conjunctiva and lids: No swelling, erythema, or discharge  Pupils and irises: Equal, round and reactive to light  Sclera non-icteric  Ears, Nose, Mouth, and Throat   External inspection of ears and nose: Normal     Nasal mucosa, septum, and turbinates: Normal without edema or erythema  Neck   Supple, symmetric, trachea midline, no masses   Pulmonary   Respiratory effort: No increased work of breathing or signs of respiratory distress  Auscultation of lungs: Clear to auscultation, equal breath sounds bilaterally, no wheezes, no rales, no rhonci      Cardiovascular   Auscultation of heart: Normal rate and rhythm, normal S1 and S2, without murmurs  Examination of extremities for edema and/or varicosities: Normal     Abdomen   Abdomen: Non-tender, no masses  Liver and spleen: No hepatomegaly or splenomegaly  Lymphatic   Palpation of lymph nodes in neck: No lymphadenopathy  Musculoskeletal   Gait and station: Normal     Digits and nails: Normal without clubbing or cyanosis  Inspection/palpation of joints, bones, and muscles: Normal     Extremities: No clubbing, no cyanosis, no edema   Skin   Skin and subcutaneous tissue: Abnormal   Stage 2 buttock decub from hospitalization  Neurologic   Reflexes: 2+ and symmetric  Sensation: Motor and sensory grossly intact  Psychiatric   Orientation to person, place and time: Normal     Mood and affect: Normal        Future Appointments    Date/Time Provider Specialty Site   02/29/2016 02:00 PM Dominga Sandra MD General Surgery [place] [address]     Signatures   Electronically signed by :  Bartolome Orozco; Feb 18 2016  2:28PM EST                       (Author)

## 2018-01-15 VITALS
SYSTOLIC BLOOD PRESSURE: 108 MMHG | WEIGHT: 189.44 LBS | HEIGHT: 72 IN | HEART RATE: 52 BPM | BODY MASS INDEX: 25.66 KG/M2 | DIASTOLIC BLOOD PRESSURE: 76 MMHG

## 2018-01-19 ENCOUNTER — HOSPITAL ENCOUNTER (EMERGENCY)
Facility: HOSPITAL | Age: 83
Discharge: HOME | End: 2018-01-19
Attending: EMERGENCY MEDICINE | Admitting: EMERGENCY MEDICINE
Payer: MEDICARE

## 2018-01-19 VITALS
OXYGEN SATURATION: 97 % | RESPIRATION RATE: 16 BRPM | TEMPERATURE: 98.3 F | SYSTOLIC BLOOD PRESSURE: 175 MMHG | HEART RATE: 56 BPM | DIASTOLIC BLOOD PRESSURE: 88 MMHG | BODY MASS INDEX: 24.85 KG/M2 | WEIGHT: 193.56 LBS

## 2018-01-19 DIAGNOSIS — S51.011A SKIN TEAR OF RIGHT ELBOW WITHOUT COMPLICATION, INITIAL ENCOUNTER: ICD-10-CM
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
## 2018-01-19 PROCEDURE — 90471 IMMUNIZATION ADMIN: CPT

## 2018-01-19 PROCEDURE — 90715 TDAP VACCINE 7 YRS/> IM: CPT | Performed by: PHYSICIAN ASSISTANT

## 2018-01-19 PROCEDURE — 99283 EMERGENCY DEPT VISIT LOW MDM: CPT

## 2018-01-19 RX ADMIN — TETANUS TOXOID, REDUCED DIPHTHERIA TOXOID AND ACELLULAR PERTUSSIS VACCINE, ADSORBED 0.5 ML: 5; 2.5; 8; 8; 2.5 SUSPENSION INTRAMUSCULAR at 09:13

## 2018-01-19 NOTE — ED PROVIDER NOTES
History  Chief Complaint   Patient presents with    Fall     dementia pt from [facility]  unwitnessed fall this morning  pt unable to provide details  only obvious injury is a skin tear to the right elbow  80-year-old male presents to the emergency department after a fall  Patient resides at [facility] in the dementia unit  States that he fell yesterday but does not recall the details of the fall  Notes that he mentioned this to staff today and that they sent him to the emergency department for further evaluation  Patient presently denies any head injury but does have a small skin tear on his right arm/elbow  Not currently bleeding  Does not recall when his last tetanus shot was  History provided by:  Patient   used: No    Fall   Mechanism of injury: fall    Injury location:  Shoulder/arm  Shoulder/arm injury location:  R elbow  Incident location:  detention  Time since incident:  1 day      Prior to Admission Medications   Prescriptions Last Dose Informant Patient Reported? Taking? Cholecalciferol (VITAMIN D3) 2000 UNITS TABS   Yes No   Sig: Take 2,000 Units by mouth daily  Tamsulosin HCl (FLOMAX PO)   Yes No   Sig: Take 0 4 mg by mouth daily at bedtime  acetaminophen (TYLENOL) 325 mg tablet   No No   Sig: Take 2 tablets by mouth every 6 (six) hours as needed for mild pain   cyanocobalamin (VITAMIN B-12) 1,000 mcg tablet   Yes No   Sig: Take 1,000 mcg by mouth daily  finasteride (PROSCAR) 5 mg tablet   Yes No   Sig: Take 5 mg by mouth daily     furosemide (LASIX) 20 mg tablet   Yes No   Sig: Take 20 mg by mouth every morning Indications: take 1/2 tab    gabapentin (NEURONTIN) 100 mg capsule   No No   Sig: Take 1 capsule by mouth daily at bedtime   magnesium hydroxide (MILK OF MAGNESIA) 400 mg/5 mL oral suspension   Yes No   Sig: Take 30 mL by mouth daily as needed for constipation   methocarbamol (ROBAXIN) 500 mg tablet   No No   Sig: Take 1 tablet by mouth every 8 (eight) hours   sodium chloride (OCEAN) 0 65 % nasal spray   Yes No   Si sprays into each nostril 3 (three) times a day  Facility-Administered Medications: None       Past Medical History:   Diagnosis Date    Actinic keratosis     Anemia     chronic hemolytic anemia    Atrial fibrillation (HCC)     Benign prostatic hypertrophy     Blood in urine     Cellulitis     Dermatitis     Disease of thyroid gland     Dyslipidemia     Epidural hematoma (HCC)     Epistaxis     Falling     Folate deficiency     GERD (gastroesophageal reflux disease)     Hip fracture (HCC)     Hyperlipidemia     Hypertension     Hypothyroidism (acquired)     Insomnia     Neuropathy     Nocturia     Osteoarthritis     Psychiatric disorder     depression    Renal disorder     Acute renal failure    Sepsis (HCC)     Shoulder pain, right     Squamous cell carcinoma     Subdural hematoma (HCC)     Thrombophlebitis     Urinary retention        Past Surgical History:   Procedure Laterality Date    HIP SURGERY         History reviewed  No pertinent family history  I have reviewed and agree with the history as documented  Social History   Substance Use Topics    Smoking status: Former Smoker     Quit date: 1976    Smokeless tobacco: Never Used    Alcohol use Yes      Comment: once a week        Review of Systems   Unable to perform ROS: Dementia       Physical Exam  ED Triage Vitals [18 0853]   Temperature Pulse Respirations Blood Pressure SpO2   98 3 °F (36 8 °C) 56 16 (!) 175/88 97 %      Temp Source Heart Rate Source Patient Position - Orthostatic VS BP Location FiO2 (%)   Oral Monitor Lying Left arm --      Pain Score       No Pain           Orthostatic Vital Signs  Vitals:    18 0853   BP: (!) 175/88   Pulse: 56   Patient Position - Orthostatic VS: Lying       Physical Exam   Constitutional: He is oriented to person, place, and time   He appears well-developed and well-nourished  No distress  HENT:   Head: Normocephalic and atraumatic  Right Ear: External ear normal    Left Ear: External ear normal    Mouth/Throat: Oropharynx is clear and moist  No oropharyngeal exudate  No external signs of trauma or bruising on the head  Non-tender  Eyes: Conjunctivae and EOM are normal  Pupils are equal, round, and reactive to light  Neck: No JVD present  No tracheal deviation present  Cardiovascular: Normal rate, regular rhythm and normal heart sounds  Exam reveals no gallop and no friction rub  No murmur heard  Pulmonary/Chest: Effort normal and breath sounds normal  No respiratory distress  He has no wheezes  He has no rales  He exhibits no tenderness  Abdominal: Soft  Bowel sounds are normal  He exhibits no distension  There is no tenderness  There is no guarding  Musculoskeletal: Normal range of motion  He exhibits no edema, tenderness or deformity  Lymphadenopathy:     He has no cervical adenopathy  Neurological: He is alert and oriented to person, place, and time  Skin: Skin is warm and dry  No rash noted  He is not diaphoretic  No erythema  Psychiatric: He has a normal mood and affect  His behavior is normal    Nursing note and vitals reviewed  ED Medications  Medications   tetanus-diphtheria-acellular pertussis (BOOSTRIX) IM injection 0 5 mL (not administered)       Diagnostic Studies  Results Reviewed     None                 No orders to display              Procedures  Procedures       Phone Contacts  ED Phone Contact    ED Course  ED Course                                MDM  Number of Diagnoses or Management Options  Fall, initial encounter:   Skin tear of right elbow without complication, initial encounter:   Diagnosis management comments: Differential diagnosis includes but not limited to: Fall, skin tear      CritCare Time    Disposition  Final diagnoses:   Fall, initial encounter   Skin tear of right elbow without complication, initial encounter     Time reflects when diagnosis was documented in both MDM as applicable and the Disposition within this note     Time User Action Codes Description Comment    1/19/2018  8:53 AM Tay Graves Add [B05  LRPG] Fall, initial encounter     1/19/2018  8:54 AM Tay Graves Add [S51 011A] Skin tear of right elbow without complication, initial encounter       ED Disposition     ED Disposition Condition Comment    Discharge  [facility] discharge to home/self care  Condition at discharge: Stable        Follow-up Information     Follow up With Specialties Details Why [address] Amelie Velasco MD  Schedule an appointment as soon as possible for a visit  [address] [place] [address]  [phone]          Patient's Medications   Discharge Prescriptions    No medications on file     No discharge procedures on file      ED Provider  Electronically Signed by           Luis Fernando Boone PA-C  01/19/18 4821

## 2018-01-23 NOTE — MISCELLANEOUS
222 Yaya Kinsey called for clarification of wearing the Cervical collar  After review of hospital notes and Discussion with KDM, suggested they call trauma who appears to have initially placed the collar  The pt is f/u with this office for SAH/SDH only with CT scan  Ortho is managing spine fx  They were in agreement        Signatures   Electronically signed by : Corby Salomon, ; Dec 26 2017  2:00PM EST                       (Author)

## 2018-01-23 NOTE — MISCELLANEOUS
Message  Message Free Text Note Form: Patient's cervical collar can be removed  Please call orthopedics if he develops worsening pain, numbness, weakness or tingling in the extremities  This plan was clarified with Skye Jalloh PA-C with neurosurgery who was involved in his care   He is to follow up with Dr Esequiel Cheek with orthopedics as he has seen him in the past       Signatures   Electronically signed by : Michelle Mariscal, Evelio Kinsey; Dec 26 2017  1:45PM EST                       (Author)

## 2018-01-24 ENCOUNTER — OFFICE VISIT (OUTPATIENT)
Dept: OBGYN CLINIC | Facility: CLINIC | Age: 83
End: 2018-01-24
Payer: MEDICARE

## 2018-01-24 ENCOUNTER — APPOINTMENT (OUTPATIENT)
Dept: RADIOLOGY | Facility: CLINIC | Age: 83
End: 2018-01-24
Payer: MEDICARE

## 2018-01-24 VITALS
BODY MASS INDEX: 23.7 KG/M2 | HEART RATE: 88 BPM | SYSTOLIC BLOOD PRESSURE: 130 MMHG | DIASTOLIC BLOOD PRESSURE: 78 MMHG | WEIGHT: 175 LBS | HEIGHT: 72 IN

## 2018-01-24 VITALS
DIASTOLIC BLOOD PRESSURE: 70 MMHG | TEMPERATURE: 97.6 F | RESPIRATION RATE: 10 BRPM | HEIGHT: 72 IN | SYSTOLIC BLOOD PRESSURE: 130 MMHG

## 2018-01-24 DIAGNOSIS — M54.40 ACUTE MIDLINE LOW BACK PAIN WITH SCIATICA, SCIATICA LATERALITY UNSPECIFIED: Primary | ICD-10-CM

## 2018-01-24 PROCEDURE — 72070 X-RAY EXAM THORAC SPINE 2VWS: CPT

## 2018-01-24 PROCEDURE — 99213 OFFICE O/P EST LOW 20 MIN: CPT | Performed by: ORTHOPAEDIC SURGERY

## 2018-01-24 NOTE — PROGRESS NOTES
Assessment:  T9 Chance fracture; neurologically stable    Plan:    Continue TLSO brace for at least one more month  Follow-up in the office in 6 weeks  Subjective:   Patient ID: Justyn Monreal is a 80 y o  male  HPI    The patient is an 80year old male who presents for a follow-up visit  He was seen in consultation in the hospital about 6 weeks ago for a fall with subsequent T9 chance fracture that was treated conservatively with a TLSO brace  Since that time he states he is asymptomatic without any back pain or LE pain  He denies any LE paresthesias, no bowel or bladder incontinence  He offers no complaints at today's visit  He stopped wearing his back brace about 1 week ago due to discomfort  The following portions of the patient's history were reviewed and updated as appropriate: allergies  Review of Systems    Objective:  Back Exam     Tenderness   The patient is experiencing no tenderness  Muscle Strength   Right Quadriceps:  5/5   Left Quadriceps:  5/5   Right Hamstrings:  5/5   Left Hamstrings:  5/5     Tests   Straight leg raise right: negative  Straight leg raise left: negative    Comments:  NAD  Patient is seated comfortably in a wheelchair  Inspection reveals no open wounds  Entire spine is NTTP  Negative straight leg raise bilaterally  Neurologically he is intact L2-S1  Physical Exam    I have personally reviewed pertinent films in PACS

## 2018-02-06 ENCOUNTER — OFFICE VISIT (OUTPATIENT)
Dept: CARDIOLOGY CLINIC | Facility: CLINIC | Age: 83
End: 2018-02-06
Payer: MEDICARE

## 2018-02-06 VITALS
SYSTOLIC BLOOD PRESSURE: 134 MMHG | BODY MASS INDEX: 26.22 KG/M2 | HEIGHT: 68 IN | HEART RATE: 56 BPM | WEIGHT: 173 LBS | DIASTOLIC BLOOD PRESSURE: 72 MMHG

## 2018-02-06 DIAGNOSIS — I10 ESSENTIAL HYPERTENSION: Chronic | ICD-10-CM

## 2018-02-06 DIAGNOSIS — I48.20 CHRONIC ATRIAL FIBRILLATION (HCC): Primary | Chronic | ICD-10-CM

## 2018-02-06 DIAGNOSIS — S06.5X9A SUBDURAL HEMATOMA (HCC): ICD-10-CM

## 2018-02-06 DIAGNOSIS — S06.5X9A TRAUMATIC SUBDURAL HEMORRHAGE WITH LOSS OF CONSCIOUSNESS (HCC): ICD-10-CM

## 2018-02-06 PROCEDURE — 93000 ELECTROCARDIOGRAM COMPLETE: CPT | Performed by: INTERNAL MEDICINE

## 2018-02-06 PROCEDURE — 99214 OFFICE O/P EST MOD 30 MIN: CPT | Performed by: INTERNAL MEDICINE

## 2018-02-06 RX ORDER — TRAMADOL HYDROCHLORIDE 50 MG/1
1 TABLET ORAL EVERY 8 HOURS PRN
COMMUNITY
End: 2018-09-05

## 2018-02-06 RX ORDER — BISACODYL 10 MG
SUPPOSITORY, RECTAL RECTAL AS NEEDED
COMMUNITY
End: 2018-11-06

## 2018-02-06 NOTE — PROGRESS NOTES
Cardiology Follow Up    Marko Garcia  1/15/1929  [id number]  [address] [facility]  [address] [place] [address] [address]    1  Chronic atrial fibrillation (HCC)  POCT ECG   2  Essential hypertension     3  Subdural hematoma [facility])         Discussion/Summary:  He had another fall in December  The patient is admit he had no lightheadedness or dizziness this was a mechanical fall  Heart rates on telemetry were controlled but bradycardic  I have recommended discontinuation of Toprol completely at this point time  Will observe moving forward  Blood pressures been controlled  Atrial fibrillation has been rate controlled  He is off of any anti-platelet agents until cleared from Neurosurgery  Interval History:  Denies chest pain, shortness of breath, palpitations, lightheadedness, dizziness, or syncope  He denies any lower extremity edema, PND, orthopnea  Functional capacity is limited following his fall he now has to wear a brace for a thoracic fracture  Hip following up with orthopedics soon  He suffered a mechanical fall recurrent subdural hematoma  He follows with Neurosurgery and regular basis      Problem List     Subdural hematoma (HCC)    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[address] )    Atrial fibrillation ([address] ) (Chronic)    Overview Signed 2/6/2018  7:45 AM by Cori Bey DO     Description: Paroxysmal, on chronic anticoagulation         History of coagulopathy    Hypothyroidism (Chronic)    Chronic anemia (Chronic)    Hypertension (Chronic)    Hyperlipidemia (Chronic)    BPH (benign prostatic hyperplasia) (Chronic)    History of subdural hematoma (Chronic)    Overview Signed 2/4/2016 10:12 AM by Chong Whitt PA-C     January 2015         Osteoarthritis (Chronic)    Ambulatory dysfunction    Hypoalbuminemia due to protein-calorie malnutrition (HCC)    Benign prostatic hypertrophy    Hypothyroidism (acquired) Closed fracture of second lumbar vertebra with routine healing    Subarachnoid bleed (HCC)    Acute midline low back pain with sciatica    Dyslipidemia    Leg edema        Past Medical History:   Diagnosis Date    Actinic keratosis     Anemia     chronic hemolytic anemia    Atrial fibrillation (HCC)     Benign prostatic hypertrophy     Blood in urine     Cellulitis     Dermatitis     Disease of thyroid gland     Dyslipidemia     Epidural hematoma (HCC)     Epistaxis     Falling     Folate deficiency     GERD (gastroesophageal reflux disease)     Hip fracture (HCC)     Hyperlipidemia     Hypertension     Hypothyroidism (acquired)     Insomnia     Neuropathy     Nocturia     Osteoarthritis     Psychiatric disorder     depression    Renal disorder     Acute renal failure    Sepsis (HCC)     Shoulder pain, right     Squamous cell carcinoma     Subdural hematoma (HCC)     Thrombophlebitis     Urinary retention      Social History     Social History    Marital status:      Spouse name: N/A    Number of children: N/A    Years of education: N/A     Occupational History    Not on file  Social History Main Topics    Smoking status: Former Smoker     Quit date: 2/4/1976    Smokeless tobacco: Never Used    Alcohol use No      Comment: once a week    Drug use: No    Sexual activity: Not on file     Other Topics Concern    Not on file     Social History Narrative    No narrative on file      No family history on file  Past Surgical History:   Procedure Laterality Date    HIP SURGERY         Current Outpatient Prescriptions:     acetaminophen (TYLENOL) 325 mg tablet, Take 2 tablets by mouth every 6 (six) hours as needed for mild pain, Disp: 30 tablet, Rfl: 0    bisacodyl (DULCOLAX) 10 mg suppository, Insert into the rectum, Disp: , Rfl:     Cholecalciferol (VITAMIN D3) 2000 UNITS TABS, Take 2,000 Units by mouth daily  , Disp: , Rfl:     cyanocobalamin (VITAMIN B-12) 1,000 mcg tablet, Take 1,000 mcg by mouth daily  , Disp: , Rfl:     finasteride (PROSCAR) 5 mg tablet, Take 5 mg by mouth daily  , Disp: , Rfl:     furosemide (LASIX) 20 mg tablet, Take 20 mg by mouth every morning Indications: take 1/2 tab , Disp: , Rfl:     gabapentin (NEURONTIN) 100 mg capsule, Take 1 capsule by mouth daily at bedtime, Disp: 5 capsule, Rfl: 0    magnesium hydroxide (MILK OF MAGNESIA) 400 mg/5 mL oral suspension, Take 30 mL by mouth daily as needed for constipation, Disp: , Rfl:     metoprolol tartrate (LOPRESSOR) 25 mg tablet, Take by mouth, Disp: , Rfl:     sodium chloride (OCEAN) 0 65 % nasal spray, 2 sprays into each nostril 3 (three) times a day , Disp: , Rfl:     Tamsulosin HCl (FLOMAX PO), Take 0 4 mg by mouth daily at bedtime  , Disp: , Rfl:     traMADol (ULTRAM) 50 mg tablet, Take 1 tablet by mouth 2 (two) times a day, Disp: , Rfl:   No Known Allergies    Labs:     Chemistry        Component Value Date/Time     12/15/2017 0614     01/26/2014 0555    K 3 8 12/15/2017 0614    K 4 0 01/26/2014 0555     12/15/2017 0614     01/26/2014 0555    CO2 26 12/15/2017 0614    CO2 27 01/26/2014 0555    BUN 22 12/15/2017 0614    BUN 18 01/26/2014 0555    CREATININE 1 01 12/15/2017 0614    CREATININE 1 27 01/26/2014 0555        Component Value Date/Time    CALCIUM 8 1 (L) 12/15/2017 0614    CALCIUM 7 9 (L) 01/26/2014 0555    ALKPHOS 45 (L) 12/15/2017 0614    ALKPHOS 100 01/22/2014 0008    AST 13 12/15/2017 0614    AST 21 01/22/2014 0008    ALT 12 12/15/2017 0614    ALT 16 01/22/2014 0008    BILITOT 1 09 (H) 12/15/2017 0614    BILITOT 0 3 01/22/2014 0008            No results found for: CHOL  No results found for: HDL  No results found for: LDLCALC  No results found for: TRIG  No components found for: CHOLHDL    Imaging: Xr Spine Thoracic 2 Vw    Result Date: 1/31/2018  Narrative: THORACIC SPINE INDICATION: Back pain with sciatica, unspecified side  History of fracture   COMPARISON: MRI thoracolumbar spine 12/14/2017 VIEWS:  Erect AP and lateral projections IMAGES:  3 FINDINGS: Thoracic dextroscoliosis with increased thoracic kyphosis  T9 and L1 fracture deformities described on recent MRI are not clearly appreciated by plain film  There is no discernible loss of vertebral body height  No other discernible fractures are seen  Bridging paravertebral ossifications are seen throughout the thoracic and upper lumbar spine  There is no displacement of the paraspinal line  The pedicles are difficult to evaluate on this study  Impression: Limited study  T9 and L1 fracture deformities described on recent MRI are not clearly appreciated by plain film  Thoracic dextroscoliosis with bridging ossifications throughout the visualized spine  Workstation performed: [id number]       ECG:  Atrial fibrillation left ventricular hypertrophy      Review of Systems   Constitution: Negative  HENT: Negative  Eyes: Negative  Cardiovascular: Negative  Respiratory: Negative  Endocrine: Negative  Hematologic/Lymphatic: Negative  Skin: Negative  Musculoskeletal: Negative  Gastrointestinal: Negative  Genitourinary: Negative  Neurological: Negative  Psychiatric/Behavioral: Negative  Vitals:    02/06/18 1103   BP: 134/72   Pulse: 56     Vitals:    02/06/18 1103   Weight: 78 5 kg (173 lb)     Height: 5' 8" (172 7 cm)   Body mass index is 26 3 kg/m²  Physical Exam:   Vital signs reviewed  Physical Exam:  General appearance:  Appears stated age, alert, well appearing and in no distress  HEENT:  PERRLA, EOMI, no scleral icterus, no conjunctival pallor  NECK:  Supple, No elevated JVP, no thyromegaly, no carotid bruits  HEART:  Irregular rhythm, normal S1/S2, no S3/S4, no murmur or rub  LUNGS:  Clear to auscultation bilaterally, no wheezes rales or rhonchi  ABDOMEN:  Soft, non-tender, positive bowel sounds, no rebound or guarding, no organomegaly   EXTREMITIES:  No edema, normal range of motion  VASCULAR:  Normal pedal pulses, good pulse volume   SKIN: No lesions or rashes on exposed skin  NEURO:  CN II-XII intact, no focal deficits

## 2018-02-09 ENCOUNTER — HOSPITAL ENCOUNTER (OUTPATIENT)
Dept: CT IMAGING | Facility: HOSPITAL | Age: 83
Discharge: HOME | End: 2018-02-09
Payer: MEDICARE

## 2018-02-09 DIAGNOSIS — S06.5X9A TRAUMATIC SUBDURAL HEMORRHAGE WITH LOSS OF CONSCIOUSNESS (HCC): ICD-10-CM

## 2018-02-09 PROCEDURE — 70450 CT HEAD/BRAIN W/O DYE: CPT

## 2018-02-13 ENCOUNTER — OFFICE VISIT (OUTPATIENT)
Dept: NEUROSURGERY | Facility: CLINIC | Age: 83
End: 2018-02-13
Payer: MEDICARE

## 2018-02-13 VITALS
SYSTOLIC BLOOD PRESSURE: 125 MMHG | RESPIRATION RATE: 15 BRPM | HEIGHT: 68 IN | DIASTOLIC BLOOD PRESSURE: 80 MMHG | TEMPERATURE: 98.9 F

## 2018-02-13 DIAGNOSIS — S06.5X9A SUBDURAL HEMATOMA (HCC): Primary | ICD-10-CM

## 2018-02-13 PROBLEM — I60.9 SUBARACHNOID BLEED (HCC): Status: RESOLVED | Noted: 2017-12-13 | Resolved: 2018-02-13

## 2018-02-13 PROCEDURE — 99213 OFFICE O/P EST LOW 20 MIN: CPT | Performed by: NEUROLOGICAL SURGERY

## 2018-02-13 NOTE — PROGRESS NOTES
Assessment/Plan:    Subdural hematoma ([facility] )        Discussion / Summary: This is a 80 y o  male who presents for routine follow up for history of traumatic subdural hematoma  Mr Raciel Santiago is doing well clinically from a neurosurgical standpoint  CT head ( 2/9/18 ) was reviewed in detail by Dr Corey Vazquez and demonstrates resolution of the previously noted left sided subdural fluid collection  There is no acute intracranial abnormality on most recent CT Head (2/9/18)  Also reviewed prior 1/2/18 CT head  At this juncture patient is released to follow up with our office on an as needed basis from a neurosrugical standpoint  Patient is advised to follow up with his Cardiologist / PCP for risk/benefit discussion and determination for any use of Aspirin or other blood thinning medication in the future with recommendation that PCP /Cardiologist take into consideration patient's age, indication for medication, history of intracranial hemorrhages, other comorbidities, and fall risk  No contraindication from neurosurgical standpoint at this juncture for patient to resume Aspirin 81mg at the recommendation and management of Dr Amy Ford (Cardiologist)  Recommend patient take fall precautions and refrain from strenuous activity or activity that increases chance of fall or injury to head  Patient is advised to call our office or present to Emergency room if experience new or worsening headache, mental status change, seizure,  nausea /vomiting, speech / vision change, motor or sensory change, or other neurological changes  Patient and his daughter expressed understanding and agreement     __________________________________________________________________________    Subjective:      Patient ID: Lucie Arguelles is a 80 y o  male who presents for routine follow up for history of traumatic subdural hematoma  Patient comes to the office accompanied with his daughter Becky Mcguire)    Patient had follow up CT head completed 2/9/18  HPI   In review -- patient was seen in initial neurosurgical hospital consult 12/14/17 for traumatic subarachnoid hemorrhage / traumatic SDH that was diagnosed s/p mechanical falls  He has past medical history of ambulatory dysfunction, AFib, history of subdural (status post fall 1/2014; non-op management), lumbar spine fractures (L1/L2) among other comorbidities  He presented to [facility] emergency room 12/13/2017 status post fall when he tripped on telephone wire in his assisted living facility at [address] [place] [address]  He also reported he tripped and fell in [place] earlier that week  He ambulates with a rolling walker at baseline  He also reportedly sustained a fall about 6 months prior to hospitalization in Dec  2017  He has history of hip fracture and L1 and L2 fractures  He is established with Dr Ruben Smith practice (orthopedics) who previously evaluated him for L1/L2 fractures which were treated with TLSO brace in 2016  Reportedly cardiologist took him off Coumadin because of history of repetitive falls in past but he had remained on aspirin for a fib prior to hospitalization in December 2017  During work up for hospitalization on 12/13/17 he was diagnosed with acute traumatic bifrontal subdural hematoma, acute traumatic left posterior parietal subarachnoid hemorrhage, T9 Chance fracture with associated T8-T9 disc space widening concerning for ligamentous injury, and L1-L2 fractures (acute on chronic), and rib fractures  Neurosurgery was consulted for the intracranial hemorrhages and aspirin was held  He was recommended follow-up with CT head for the intracranial hemorrhages  The spinal fractures were being managed by [facility] [address] [place] [address] service with TLSO brace  Patient last seen in our office 1/11/18 at which juncture he remained off Aspirin and was recommended follow up in 4 weeks with new CT Head      Patient had requested CT head completed 2/9/18 and presents for follow up  Patient remains off baby Aspirin although records from [facility] is inquiring when patient may resume Aspirin 81mg daily as Dr Amy Ford (Cardiology) has recommended patient resume 81mg daily (for A fib) when cleared from neurosurgical standpoint  Patient denies headache  He denies dizziness, nausea, or vomiting  He denies seizure  He denies LOC of altered level of consciousness in interval since last visit  Patient denies visual disturbances  Patient denies speech disturbance  Patient / daughter deny patient having change in memory or cognitive status in interval since last visit  He denies numbness, tingling, or weakness  He continues to follow up with Orthopedics for management of spine fractures (in TLSO brace)  Patient's daughter reports he is participating with PT and OT at his care facility  The following portions of the patient's history were reviewed and updated as appropriate: allergies, current medications, past family history, past medical history, past social history and past surgical history  Review of Systems   Constitutional: Negative for fever  HENT: Negative  Eyes: Negative  Respiratory: Negative for shortness of breath  Cardiovascular: Negative for chest pain  Gastrointestinal: Negative for nausea and vomiting  Genitourinary: Negative for difficulty urinating  Neurological: Negative for dizziness, speech difficulty, weakness and headaches  Psychiatric/Behavioral: Negative for agitation  Objective:    Vitals:    02/13/18 1344   BP: 125/80   Resp: 15   Temp: 98 9 °F (37 2 °C)        Physical Exam   Constitutional: He appears well-developed and well-nourished  No distress  Elderly appearing male sitting in wheelchair wear TLSO brace  Eyes: Conjunctivae and EOM are normal  Pupils are equal, round, and reactive to light  Pulmonary/Chest: Effort normal    Skin: Skin is dry     Psychiatric: His speech is normal  Neurologic Exam     Mental Status   Oriented to person  Oriented to place  Oriented to time  Speech: speech is normal       Alert, oriented 3, thought content appropriate  GCS15  Briskly follows commands  Id's pen, the ink, and function to write  Id's colors x 3 correct  Names 3 dejao in [facility]  Counts fingers correct  Shows correct number fingers both hands  Unable to name current [place] president without prompt     Cranial Nerves     CN III, IV, VI   Pupils are equal, round, and reactive to light  Extraocular motions are normal      CN V   Facial sensation intact  CN VII   Facial expression full, symmetric  CN VIII   Hearing: intact    CN IX, X   Palate: symmetric    CN XI   Right sternocleidomastoid strength: normal  Left sternocleidomastoid strength: normal  Right trapezius strength: normal  Left trapezius strength: normal    CN XII   Tongue: not atrophic  Fasciculations: absent  Tongue deviation: none    Motor Exam     Strength   Right deltoid: 5/5  Left deltoid: 5/5  Right biceps: 5/5  Left biceps: 5/5  Right triceps: 5/5  Left triceps: 5/5  Right wrist flexion: 5/5  Left wrist flexion: 5/5  Right wrist extension: 5/5  Left wrist extension: 5/5  Right interossei: 5/5  Left interossei: 5/5  Right iliopsoas: 5/5  Left iliopsoas: 5/5  Right quadriceps: 5/5  Left quadriceps: 5/5  Right hamstrin/5  Left hamstrin/5  Right anterior tibial: 5/5  Left anterior tibial: 5/5  Right gastroc: 5/5  Left gastroc: 5/5  Limitation with left shoulder abduction and right ankle DF which patient reports is chronic baseline  Sensory Exam   Light touch normal      Sensation to LT intact b/l upper extremities, torso, and lower extremities bilaterally  Gait, Coordination, and Reflexes     Tremor   Resting tremor: absent  No pronator drift  Finger to nose intact bilaterally         Imaging studies:  [facility]) per report: "CT BRAIN - WITHOUT CONTRAST    INDICATION: S06  5X9A: Traumatic subdural hemorrhage with loss of consciousness of unspecified duration, initial encounter  History taken directly from the electronic ordering system  History of subdural hematoma  Follow-up evaluation  COMPARISON: 1/2/2018    TECHNIQUE: CT examination of the brain was performed  In addition to axial images, coronal reformatted images were created and submitted for interpretation  Radiation dose length product (DLP) for this visit: 1092 mGy-cm   This examination, like all CT scans performed in the [facility], was performed utilizing techniques to minimize radiation dose exposure, including the use of iterative   reconstruction and automated exposure control  IMAGE QUALITY: Diagnostic  FINDINGS:    PARENCHYMA: Decreased attenuation is noted in the supratentorial white matter demonstrating an appearance most consistent with moderate microangiopathic change  No intracranial mass, mass effect or midline shift  No CT signs of acute infarction  There is no parenchymal hemorrhage  Atherosclerotic calcifications noted  Previously noted subdural fluid over the left frontal convexity has resolved  VENTRICLES AND EXTRA-AXIAL SPACES: Normal for patient's age  VISUALIZED ORBITS AND PARANASAL SINUSES: Unremarkable  CALVARIUM AND EXTRACRANIAL SOFT TISSUES: Normal       IMPRESSION:    No acute intracranial abnormality  Microangiopathic changes  Resolved subdural fluid noted previously, anterior to left frontal lobe  Workstation performed: [id number]    Signed by:   Fani Hyde MD  2/9/18   Report Author: Fatimah Ricks    Approved by: Fatimah Ricks    Approval Date: 02-   Approval Time: 14:44     THIS REPORT WAS RECEIVED FROM AN EXTERNAL RIS SYSTEM "

## 2018-02-13 NOTE — PATIENT INSTRUCTIONS
Recommend patient take fall precautions and refrain from strenuous activity or activity that increases chance of fall or injury to head  Patient is advised to follow up with his Cardiologist / PCP for risk/benefit discussion and determination for any use of Aspirin or other blood thinning medication in the future with recommendation that PCP /Cardiologist take into consideration patient's age, indication for medication, history of intracranial hemorrhages, other comorbidities, and fall risk  No contraindication from neurosurgical standpoint at this juncture for patient to resume Aspirin 81mg at the recommendation and management of Dr Darya Nolan (Cardiologist)  Patient is to call our office or present to Emergency room if experience new or worsening headache, mental status change, seizure,  nausea /vomiting, speech / vision change, motor or sensory change, or other neurological changes

## 2018-02-13 NOTE — LETTER
February 13, 2018     Johnson Hartley MD  [address] [place] [address] [place] [address]    Patient: Heron Cordero   YOB: 1929   Date of Visit: 2/13/2018       Dear Dr Theadore Carrel:    Thank you for referring Aysha Shaw to me for evaluation  Below are my notes for this consultation  If you have questions, please do not hesitate to call me  I look forward to following your patient along with you  Sincerely,        Nathaniel Borjas MD        CC: DO Ankita Burnette PA-C  2/13/2018  8:27 PM  Sign at close encounter  Assessment/Plan:    Subdural hematoma [facility])        Discussion / Summary: This is a 80 y o  male who presents for routine follow up for history of traumatic subdural hematoma  Mr Beth Riggins is doing well clinically from a neurosurgical standpoint  CT head ( 2/9/18 ) was reviewed in detail by Dr Rico Byers and demonstrates resolution of the previously noted left sided subdural fluid collection  There is no acute intracranial abnormality on most recent CT Head (2/9/18)  Also reviewed prior 1/2/18 CT head  At this juncture patient is released to follow up with our office on an as needed basis from a neurosrugical standpoint  Patient is advised to follow up with his Cardiologist / PCP for risk/benefit discussion and determination for any use of Aspirin or other blood thinning medication in the future with recommendation that PCP /Cardiologist take into consideration patient's age, indication for medication, history of intracranial hemorrhages, other comorbidities, and fall risk  No contraindication from neurosurgical standpoint at this juncture for patient to resume Aspirin 81mg at the recommendation and management of Dr Rocío Macias (Cardiologist)  Recommend patient take fall precautions and refrain from strenuous activity or activity that increases chance of fall or injury to head      Patient is advised to call our office or present to Emergency room if experience new or worsening headache, mental status change, seizure,  nausea /vomiting, speech / vision change, motor or sensory change, or other neurological changes  Patient and his daughter expressed understanding and agreement     __________________________________________________________________________    Subjective:      Patient ID: Minerva Farrell is a 80 y o  male who presents for routine follow up for history of traumatic subdural hematoma  Patient comes to the office accompanied with his daughter Kari Ignacio)  Patient had follow up CT head completed 2/9/18  HPI   In review -- patient was seen in initial neurosurgical hospital consult 12/14/17 for traumatic subarachnoid hemorrhage / traumatic SDH that was diagnosed s/p mechanical falls  He has past medical history of ambulatory dysfunction, AFib, history of subdural (status post fall 1/2014; non-op management), lumbar spine fractures (L1/L2) among other comorbidities  He presented to [facility] emergency room 12/13/2017 status post fall when he tripped on telephone wire in his assisted living facility at [address] [place] [address]  He also reported he tripped and fell in [place] earlier that week  He ambulates with a rolling walker at baseline  He also reportedly sustained a fall about 6 months prior to hospitalization in Dec  2017  He has history of hip fracture and L1 and L2 fractures  He is established with Dr Ruben Smith practice (orthopedics) who previously evaluated him for L1/L2 fractures which were treated with TLSO brace in 2016  Reportedly cardiologist took him off Coumadin because of history of repetitive falls in past but he had remained on aspirin for a fib prior to hospitalization in December 2017      During work up for hospitalization on 12/13/17 he was diagnosed with acute traumatic bifrontal subdural hematoma, acute traumatic left posterior parietal subarachnoid hemorrhage, T9 Chance fracture with associated T8-T9 disc space widening concerning for ligamentous injury, and L1-L2 fractures (acute on chronic), and rib fractures  Neurosurgery was consulted for the intracranial hemorrhages and aspirin was held  He was recommended follow-up with CT head for the intracranial hemorrhages  The spinal fractures were being managed by [address] [place] [address] service with TLSO brace  Patient last seen in our office 1/11/18 at which juncture he remained off Aspirin and was recommended follow up in 4 weeks with new CT Head  Patient had requested CT head completed 2/9/18 and presents for follow up  Patient remains off baby Aspirin although records from [facility] is inquiring when patient may resume Aspirin 81mg daily as Dr Dennis Tomas (Cardiology) has recommended patient resume 81mg daily (for A fib) when cleared from neurosurgical standpoint  Patient denies headache  He denies dizziness, nausea, or vomiting  He denies seizure  He denies LOC of altered level of consciousness in interval since last visit  Patient denies visual disturbances  Patient denies speech disturbance  Patient / daughter deny patient having change in memory or cognitive status in interval since last visit  He denies numbness, tingling, or weakness  He continues to follow up with Orthopedics for management of spine fractures (in TLSO brace)  Patient's daughter reports he is participating with PT and OT at his care facility  The following portions of the patient's history were reviewed and updated as appropriate: allergies, current medications, past family history, past medical history, past social history and past surgical history  Review of Systems   Constitutional: Negative for fever  HENT: Negative  Eyes: Negative  Respiratory: Negative for shortness of breath  Cardiovascular: Negative for chest pain  Gastrointestinal: Negative for nausea and vomiting  Genitourinary: Negative for difficulty urinating     Neurological: Negative for dizziness, speech difficulty, weakness and headaches  Psychiatric/Behavioral: Negative for agitation  Objective:    Vitals:    18 1344   BP: 125/80   Resp: 15   Temp: 98 9 °F (37 2 °C)        Physical Exam   Constitutional: He appears well-developed and well-nourished  No distress  Elderly appearing male sitting in wheelchair wear TLSO brace  Eyes: Conjunctivae and EOM are normal  Pupils are equal, round, and reactive to light  Pulmonary/Chest: Effort normal    Skin: Skin is dry  Psychiatric: His speech is normal        Neurologic Exam     Mental Status   Oriented to person  Oriented to place  Oriented to time  Speech: speech is normal       Alert, oriented 3, thought content appropriate  GCS15  Briskly follows commands  Id's pen, the ink, and function to write  Id's colors x 3 correct  Names 3 boro in [place] -- Sidra Orozco, Rigoberto  Counts fingers correct  Shows correct number fingers both hands  Unable to name current [place] president without prompt     Cranial Nerves     CN III, IV, VI   Pupils are equal, round, and reactive to light  Extraocular motions are normal      CN V   Facial sensation intact  CN VII   Facial expression full, symmetric       CN VIII   Hearing: intact    CN IX, X   Palate: symmetric    CN XI   Right sternocleidomastoid strength: normal  Left sternocleidomastoid strength: normal  Right trapezius strength: normal  Left trapezius strength: normal    CN XII   Tongue: not atrophic  Fasciculations: absent  Tongue deviation: none    Motor Exam     Strength   Right deltoid: 5/5  Left deltoid: 5/5  Right biceps: 5/5  Left biceps: 5/5  Right triceps: 5/5  Left triceps: 5/5  Right wrist flexion: 5/5  Left wrist flexion: 5/5  Right wrist extension: 5/5  Left wrist extension: 5/5  Right interossei: 5/5  Left interossei: 5/5  Right iliopsoas: 5/5  Left iliopsoas: 5/5  Right quadriceps: 5/5  Left quadriceps: 5/5  Right hamstrin/5  Left hamstrin/5  Right anterior tibial: 5/5  Left anterior tibial: 5/5  Right gastroc: 5/5  Left gastroc: 5/5  Limitation with left shoulder abduction and right ankle DF which patient reports is chronic baseline  Sensory Exam   Light touch normal      Sensation to LT intact b/l upper extremities, torso, and lower extremities bilaterally  Gait, Coordination, and Reflexes     Tremor   Resting tremor: absent  No pronator drift  Finger to nose intact bilaterally  Imaging studies:  [facility]) per report: "CT BRAIN - WITHOUT CONTRAST    INDICATION: S06  5X9A: Traumatic subdural hemorrhage with loss of consciousness of unspecified duration, initial encounter  History taken directly from the electronic ordering system  History of subdural hematoma  Follow-up evaluation  COMPARISON: 2018    TECHNIQUE: CT examination of the brain was performed  In addition to axial images, coronal reformatted images were created and submitted for interpretation  Radiation dose length product (DLP) for this visit: 1092 mGy-cm   This examination, like all CT scans performed in the [facility], was performed utilizing techniques to minimize radiation dose exposure, including the use of iterative   reconstruction and automated exposure control  IMAGE QUALITY: Diagnostic  FINDINGS:    PARENCHYMA: Decreased attenuation is noted in the supratentorial white matter demonstrating an appearance most consistent with moderate microangiopathic change  No intracranial mass, mass effect or midline shift  No CT signs of acute infarction  There is no parenchymal hemorrhage  Atherosclerotic calcifications noted  Previously noted subdural fluid over the left frontal convexity has resolved  VENTRICLES AND EXTRA-AXIAL SPACES: Normal for patient's age  VISUALIZED ORBITS AND PARANASAL SINUSES: Unremarkable      CALVARIUM AND EXTRACRANIAL SOFT TISSUES: Normal       IMPRESSION:    No acute intracranial abnormality  Microangiopathic changes  Resolved subdural fluid noted previously, anterior to left frontal lobe  Workstation performed: [id number]    Signed by:   Lluvia Fairbanks MD  2/9/18   Report Author: Karl Mabry    Approved by: Karl Mabry    Approval Date: 02-   Approval Time: 14:44     THIS REPORT WAS RECEIVED FROM AN EXTERNAL RIS SYSTEM "

## 2018-02-26 ENCOUNTER — OFFICE VISIT (OUTPATIENT)
Dept: OBGYN CLINIC | Facility: HOSPITAL | Age: 83
End: 2018-02-26
Payer: MEDICARE

## 2018-02-26 ENCOUNTER — HOSPITAL ENCOUNTER (OUTPATIENT)
Dept: RADIOLOGY | Facility: HOSPITAL | Age: 83
Discharge: HOME | End: 2018-02-26
Attending: ORTHOPAEDIC SURGERY
Payer: MEDICARE

## 2018-02-26 VITALS
BODY MASS INDEX: 24.91 KG/M2 | WEIGHT: 174 LBS | DIASTOLIC BLOOD PRESSURE: 87 MMHG | SYSTOLIC BLOOD PRESSURE: 129 MMHG | HEIGHT: 70 IN

## 2018-02-26 DIAGNOSIS — M54.9 DORSALGIA: Primary | ICD-10-CM

## 2018-02-26 PROBLEM — S22.072D: Status: ACTIVE | Noted: 2018-02-26

## 2018-02-26 PROCEDURE — 72070 X-RAY EXAM THORAC SPINE 2VWS: CPT

## 2018-02-26 PROCEDURE — 99213 OFFICE O/P EST LOW 20 MIN: CPT | Performed by: ORTHOPAEDIC SURGERY

## 2018-02-26 NOTE — ASSESSMENT & PLAN NOTE
Patient has history of T9 chance fracture  He is currently asymptomatic  He is pain free  He is neurologically stable L2-S1  Discontinue TLSO brace  Continue with physical therapy and strengthening  Follow-up p r n

## 2018-02-26 NOTE — PROGRESS NOTES
Assessment/Plan:    Closed unstable burst fracture of ninth thoracic vertebra with routine healing  Patient has history of T9 chance fracture  He is currently asymptomatic  He is pain free  He is neurologically stable L2-S1  Discontinue TLSO brace  Continue with physical therapy and strengthening  Follow-up p r n  Problem List Items Addressed This Visit     None      Visit Diagnoses     Dorsalgia    -  Primary    Relevant Orders    XR spine thoracic 2 vw            Subjective:      Patient ID: Shivani Mosqueda is a 80 y o  male  HPI  Patient presents to the office for follow up for T9 chance fracture  He has been wearing his brace  He reports no pain today in the office  The following portions of the patient's history were reviewed and updated as appropriate: current medications, past family history, past medical history, past social history, past surgical history and problem list     Review of Systems   Constitutional: Negative  HENT: Negative  Eyes: Negative  Respiratory: Negative  Cardiovascular: Negative  Gastrointestinal: Negative  Endocrine: Negative  Genitourinary: Negative  Allergic/Immunologic: Negative  Hematological: Negative  Psychiatric/Behavioral: Negative  Objective:      /87   Ht 5' 10" (1 778 m)   Wt 78 9 kg (174 lb)   BMI 24 97 kg/m²          Physical Exam   Constitutional: He is oriented to person, place, and time  He appears well-developed and well-nourished  HENT:   Head: Normocephalic and atraumatic  Eyes: Pupils are equal, round, and reactive to light  Cardiovascular: Intact distal pulses  Pulmonary/Chest: Breath sounds normal    Neurological: He is alert and oriented to person, place, and time  Skin: Skin is warm and dry  Psychiatric: He has a normal mood and affect   His behavior is normal        Able to straight leg raise bilaterally  Patient presents to the office in a wheelchair, but states he ambulates with assistance of a walker    Imaging  Thoracic spine x-ray demonstrates significant kyphosis    No obvious change in appearance compared to previous x-rays

## 2018-03-06 ENCOUNTER — APPOINTMENT (EMERGENCY)
Dept: CT IMAGING | Facility: HOSPITAL | Age: 83
End: 2018-03-06
Payer: MEDICARE

## 2018-03-06 ENCOUNTER — HOSPITAL ENCOUNTER (EMERGENCY)
Facility: HOSPITAL | Age: 83
Discharge: HOME | End: 2018-03-06
Attending: EMERGENCY MEDICINE
Payer: MEDICARE

## 2018-03-06 VITALS
HEART RATE: 50 BPM | OXYGEN SATURATION: 99 % | DIASTOLIC BLOOD PRESSURE: 75 MMHG | RESPIRATION RATE: 17 BRPM | TEMPERATURE: 97.8 F | WEIGHT: 173 LBS | SYSTOLIC BLOOD PRESSURE: 149 MMHG | BODY MASS INDEX: 24.82 KG/M2

## 2018-03-06 DIAGNOSIS — S09.90XA CLOSED HEAD INJURY, INITIAL ENCOUNTER: ICD-10-CM

## 2018-03-06 DIAGNOSIS — S00.83XA TRAUMATIC HEMATOMA OF FOREHEAD, INITIAL ENCOUNTER: Primary | ICD-10-CM

## 2018-03-06 PROCEDURE — 99284 EMERGENCY DEPT VISIT MOD MDM: CPT

## 2018-03-06 PROCEDURE — 70450 CT HEAD/BRAIN W/O DYE: CPT

## 2018-03-07 NOTE — DISCHARGE INSTRUCTIONS
Facial Contusion   WHAT YOU NEED TO KNOW:   A facial contusion is a bruise that appears on your face after an injury  A bruise happens when small blood vessels tear but skin does not  When blood vessels tear, blood leaks into nearby tissue, such as soft tissue or muscle  You may develop swelling and bruising around your eyes if your bruise is on your brow, forehead, or the bridge of your nose  DISCHARGE INSTRUCTIONS:   Return to the emergency department if:   · You have a fever  · You have watery, clear fluid draining from your nose  · You have changes in your vision or eye appearance  · You have changes or pain with eye movement  · You have tingling or numbness in or near the injured area  Contact your healthcare provider if:   · You find a new lump in the injured area  · Your symptoms do not improve with treatment  · You have questions or concerns about your condition or care  Medicines:   · Acetaminophen  decreases pain  It is available without a doctor's order  Ask how much to take and how often to take it  Follow directions  Acetaminophen can cause liver damage if not taken correctly  · Take your medicine as directed  Contact your healthcare provider if you think your medicine is not helping or if you have side effects  Tell him of her if you are allergic to any medicine  Keep a list of the medicines, vitamins, and herbs you take  Include the amounts, and when and why you take them  Bring the list or the pill bottles to follow-up visits  Carry your medicine list with you in case of an emergency  Ice:  Apply ice on your bruise for 15 to 20 minutes every hour or as directed  Use an ice pack, or put crushed ice in a plastic bag  Cover it with a towel  Ice helps prevent tissue damage and decreases swelling and pain  Elevation:  Sleep with your head elevated to help decrease swelling     Help your contusion heal:  Do not  massage the area or put heating pads or other warming devices on the bruise right after your injury  Heat and massage may slow the healing of the area  Follow up with your healthcare provider as directed: You may need to return within a week to have your injury checked again  Write down any questions you have so you remember to ask them in your follow-up visits  Prevent a facial contusion:   · Use safety belts and child restraints  · Use safety helmets when you ride a bicycle or motorcycle  · Use a mouth and face guard during sports  © 2017 [facility] Information is for End User's use only and may not be sold, redistributed or otherwise used for commercial purposes  All illustrations and images included in CareNotes® are the copyrighted property of A D [employer] M [employer]  or [employer]  The above information is an  only  It is not intended as medical advice for individual conditions or treatments  Talk to your doctor, nurse or pharmacist before following any medical regimen to see if it is safe and effective for you  Head Injury   WHAT YOU NEED TO KNOW:   A head injury is most often caused by a blow to the head  This may occur from a fall, bicycle injury, sports injury, being struck in the head, or a motor vehicle accident  DISCHARGE INSTRUCTIONS:   Call 911 or have someone else call for any of the following:   · You cannot be woken  · You have a seizure  · You stop responding to others or you faint  · You have blurry or double vision  · Your speech becomes slurred or confused  · You have arm or leg weakness, loss of feeling, or new problems with coordination  · Your pupils are larger than usual or one pupil is a different size than the other  · You have blood or clear fluid coming out of your ears or nose  Return to the emergency department if:   · You have repeated or forceful vomiting  · You feel confused  · Your headache gets worse or becomes severe      · You or someone caring for you notices that you are harder to wake than usual   Contact your healthcare provider if:   · Your symptoms last longer than 6 weeks after the injury  · You have questions or concerns about your condition or care  Medicines:   · Acetaminophen  decreases pain  Acetaminophen is available without a doctor's order  Ask how much to take and how often to take it  Follow directions  Acetaminophen can cause liver damage if not taken correctly  · Take your medicine as directed  Contact your healthcare provider if you think your medicine is not helping or if you have side effects  Tell him or her if you are allergic to any medicine  Keep a list of the medicines, vitamins, and herbs you take  Include the amounts, and when and why you take them  Bring the list or the pill bottles to follow-up visits  Carry your medicine list with you in case of an emergency  Self-care:   · Rest  or do quiet activities for 24 to 48 hours  Limit your time watching TV, using the computer, or doing tasks that require a lot of thinking  Slowly return to your normal activities as directed  Do not play sports or do activities that may cause you to get hit in the head  Ask your healthcare provider when you can return to sports  · Apply ice  on your head for 15 to 20 minutes every hour or as directed  Use an ice pack, or put crushed ice in a plastic bag  Cover it with a towel before you apply it to your skin  Ice helps prevent tissue damage and decreases swelling and pain  · Have someone stay with you for 24 hours  or as directed  This person can monitor you for complications and call [phone]  When you are awake the person should ask you a few questions to see if you are thinking clearly  An example would be to ask your name or your address  Prevent another head injury:   · Wear a helmet that fits properly  Do this when you play sports, or ride a bike, scooter, or skateboard  Helmets help decrease your risk of a serious head injury   Talk to your healthcare provider about other ways you can protect yourself if you play sports  · Wear your seat belt every time you are in a car  This helps to decrease your risk for a head injury if you are in a car accident  Follow up with your healthcare provider as directed:  Write down your questions so you remember to ask them during your visits  © 2017 [address]  Information is for End User's use only and may not be sold, redistributed or otherwise used for commercial purposes  All illustrations and images included in CareNotes® are the copyrighted property of [employer]  or [employer]cantara  The above information is an  only  It is not intended as medical advice for individual conditions or treatments  Talk to your doctor, nurse or pharmacist before following any medical regimen to see if it is safe and effective for you

## 2018-03-07 NOTE — ED PROVIDER NOTES
History  Chief Complaint   Patient presents with    Fall     Pt arrives via EMS from [facility] s/p fall while transfering from an auto lift chair to the bed  Pt presents with a hematoma on his left lateral temporal and a left elbow skin tear  History provided by:  Patient, relative and EMS personnel (daughter)  Fall   Mechanism of injury: fall    Injury location:  Head/neck  Head/neck injury location:  Head  Incident location:  jail  Time since incident:  1 hour  Arrived directly from scene: yes    Fall:     Fall occurred: Patient was getting out of the recurring ear that he sleeps in attempting to go the bathroom tripped and fell, face/ head 1st down on the ground  Impact surface:  Hard floor    Point of impact:  Head  Protective equipment: none    Suspicion of alcohol use: no    Suspicion of drug use: no    Tetanus status:  Up to date  Prior to arrival data:     Bystander interventions:  None    Blood loss:  None    Responsiveness at scene:  Alert    Orientation at scene:  Person, place, situation and time    Loss of consciousness: no      Amnesic to event: no      Airway interventions:  None    Immobilization:  None  Current pain details:     Pain quality:  Dull    Pain Severity:  Moderate    Pain timing:  Constant  Associated symptoms: headaches    Associated symptoms: no abdominal pain, no blindness, no chest pain, no loss of consciousness, no nausea, no neck pain and no vomiting        Prior to Admission Medications   Prescriptions Last Dose Informant Patient Reported? Taking? Cholecalciferol (VITAMIN D3) 2000 UNITS TABS  Self Yes Yes   Sig: Take 2,000 Units by mouth daily  Tamsulosin HCl (FLOMAX PO)  Self Yes Yes   Sig: Take 0 4 mg by mouth daily at bedtime     acetaminophen (TYLENOL) 325 mg tablet  Self No Yes   Sig: Take 2 tablets by mouth every 6 (six) hours as needed for mild pain   bisacodyl (DULCOLAX) 10 mg suppository  Self Yes Yes   Sig: Insert into the rectum as needed cyanocobalamin (VITAMIN B-12) 1,000 mcg tablet  Self Yes Yes   Sig: Take 1,000 mcg by mouth daily  finasteride (PROSCAR) 5 mg tablet  Self Yes Yes   Sig: Take 5 mg by mouth daily  furosemide (LASIX) 20 mg tablet  Self Yes Yes   Sig: Take 10 mg by mouth every morning     gabapentin (NEURONTIN) 100 mg capsule  Self No Yes   Sig: Take 1 capsule by mouth daily at bedtime   magnesium hydroxide (MILK OF MAGNESIA) 400 mg/5 mL oral suspension  Self Yes Yes   Sig: Take 30 mL by mouth daily as needed for constipation   metoprolol tartrate (LOPRESSOR) 25 mg tablet  Self Yes No   Sig: Take by mouth   sodium chloride (OCEAN) 0 65 % nasal spray  Self Yes No   Si sprays into each nostril 3 (three) times a day  traMADol (ULTRAM) 50 mg tablet  Self Yes Yes   Sig: Take 1 tablet by mouth every 8 (eight) hours as needed        Facility-Administered Medications: None       Past Medical History:   Diagnosis Date    Actinic keratosis     Anemia     chronic hemolytic anemia    Atrial fibrillation (HCC)     Benign prostatic hypertrophy     Blood in urine     Cellulitis     Dermatitis     Disease of thyroid gland     Dyslipidemia     Epidural hematoma (HCC)     Epistaxis     Falling     Folate deficiency     GERD (gastroesophageal reflux disease)     Hip fracture (HCC)     Hyperlipidemia     Hypertension     Hypothyroidism (acquired)     Insomnia     Neuropathy     Nocturia     Osteoarthritis     Psychiatric disorder     depression    Renal disorder     Acute renal failure    Sepsis (HCC)     Shoulder pain, right     Squamous cell carcinoma     Subdural hematoma (HCC)     Thrombophlebitis     Urinary retention        Past Surgical History:   Procedure Laterality Date    HIP SURGERY         History reviewed  No pertinent family history  I have reviewed and agree with the history as documented      Social History   Substance Use Topics    Smoking status: Former Smoker     Quit date: 1976    Smokeless tobacco: Never Used    Alcohol use No      Comment: once a week        Review of Systems   Constitutional: Negative for activity change, chills, diaphoresis and fever  HENT: Negative for congestion, sinus pressure and sore throat  Eyes: Negative for blindness, pain and visual disturbance  Respiratory: Negative for cough, chest tightness, shortness of breath, wheezing and stridor  Cardiovascular: Negative for chest pain and palpitations  Gastrointestinal: Negative for abdominal distention, abdominal pain, constipation, diarrhea, nausea and vomiting  Genitourinary: Negative for dysuria and frequency  Musculoskeletal: Negative for neck pain and neck stiffness  Skin: Negative for rash  Neurological: Positive for headaches  Negative for dizziness, loss of consciousness, speech difficulty, light-headedness and numbness  Physical Exam  ED Triage Vitals   Temperature Pulse Respirations Blood Pressure SpO2   03/06/18 2109 03/06/18 2109 03/06/18 2109 03/06/18 2109 03/06/18 2109   97 8 °F (36 6 °C) 56 18 145/78 99 %      Temp Source Heart Rate Source Patient Position - Orthostatic VS BP Location FiO2 (%)   03/06/18 2109 03/06/18 2109 03/06/18 2109 03/06/18 2109 --   Oral Monitor Lying Right arm       Pain Score       03/06/18 2113       3           Orthostatic Vital Signs  Vitals:    03/06/18 2109 03/06/18 2115 03/06/18 2130   BP: 145/78  146/75   Pulse: 56 (!) 52 (!) 48   Patient Position - Orthostatic VS: Lying  Lying       Physical Exam   Constitutional: He is oriented to person, place, and time  He appears well-developed  No distress  HENT:   Head: Normocephalic  Right Ear: External ear normal    Left Ear: External ear normal    Nose: Nose normal    Mouth/Throat: Oropharynx is clear and moist  No oropharyngeal exudate  Eyes: Pupils are equal, round, and reactive to light  Neck: Normal range of motion  Neck supple  No tracheal deviation present     No spinous process tenderness Cardiovascular: Normal rate, regular rhythm, normal heart sounds and intact distal pulses  No murmur heard  Pulmonary/Chest: Effort normal and breath sounds normal  No stridor  No respiratory distress  Abdominal: Soft  He exhibits no distension  There is no tenderness  There is no rebound and no guarding  Musculoskeletal: Normal range of motion  Neurological: He is alert and oriented to person, place, and time  Skin: Skin is warm and dry  He is not diaphoretic  No erythema  No pallor  Large left forehead hematoma   Psychiatric: He has a normal mood and affect  Vitals reviewed  ED Medications  Medications - No data to display    Diagnostic Studies  Results Reviewed     None                 CT head without contrast   Final Result by Althea Rothman MD (03/06 2222)      No acute intracranial abnormality    Left frontal scalp and facial hematoma                  Workstation performed: [id number]                    Procedures  Procedures       Phone Contacts  ED Phone Contact    ED Course  ED Course                                MDM  Number of Diagnoses or Management Options  Closed head injury, initial encounter: new and requires workup  Traumatic hematoma of forehead, initial encounter: new and requires workup  Diagnosis management comments: 26-year-old male presents after a mechanical from standing, will CT to evaluate for possible intracranial hemorrhage       Amount and/or Complexity of Data Reviewed  Tests in the radiology section of CPT®: ordered and reviewed  Decide to obtain previous medical records or to obtain history from someone other than the patient: yes  Obtain history from someone other than the patient: yes  Review and summarize past medical records: yes  Independent visualization of images, tracings, or specimens: yes      CritCare Time    Disposition  Final diagnoses:   Traumatic hematoma of forehead, initial encounter   Closed head injury, initial encounter     Time reflects when diagnosis was documented in both MDM as applicable and the Disposition within this note     Time User Action Codes Description Comment    3/6/2018  9:34 PM Brenden Hammonds Traumatic hematoma of forehead, initial encounter     3/6/2018  9:34 PM Serg Lamas Add [S09 90XA] Closed head injury, initial encounter       ED Disposition     ED Disposition Condition Comment    Discharge  Dafne Aakash discharge to home/self care  Condition at discharge: Good        Follow-up Information    None       Patient's Medications   Discharge Prescriptions    No medications on file     No discharge procedures on file      ED Provider  Electronically Signed by           Zeferino Barkley DO  03/06/18 3929

## 2018-03-07 NOTE — ED NOTES
Erick wilson called twice ([phone]) to give report on Pt return, left message both times with a return phone number to call back    Yuni Murcia RN  03/06/18 8239

## 2018-04-04 ENCOUNTER — APPOINTMENT (EMERGENCY)
Dept: CT IMAGING | Facility: HOSPITAL | Age: 83
End: 2018-04-04
Payer: MEDICARE

## 2018-04-04 ENCOUNTER — HOSPITAL ENCOUNTER (EMERGENCY)
Facility: HOSPITAL | Age: 83
Discharge: HOME | End: 2018-04-05
Attending: EMERGENCY MEDICINE | Admitting: EMERGENCY MEDICINE
Payer: MEDICARE

## 2018-04-04 VITALS
OXYGEN SATURATION: 99 % | HEART RATE: 55 BPM | DIASTOLIC BLOOD PRESSURE: 79 MMHG | TEMPERATURE: 97.9 F | SYSTOLIC BLOOD PRESSURE: 161 MMHG | RESPIRATION RATE: 20 BRPM

## 2018-04-04 DIAGNOSIS — S09.90XA INJURY OF HEAD, INITIAL ENCOUNTER: Primary | ICD-10-CM

## 2018-04-04 PROCEDURE — 70450 CT HEAD/BRAIN W/O DYE: CPT

## 2018-04-04 PROCEDURE — 72125 CT NECK SPINE W/O DYE: CPT

## 2018-04-05 PROCEDURE — 99284 EMERGENCY DEPT VISIT MOD MDM: CPT

## 2018-04-05 NOTE — DISCHARGE INSTRUCTIONS

## 2018-04-05 NOTE — ED PROVIDER NOTES
History  Chief Complaint   Patient presents with    Fall     pt with unwitnessed fall tonight while ambulating to recliner to go to bed  hit back of head while falling  -LOC  takes daily ASA  Patient is a 80year old male who had unwitnessed fall tonight while ambulating to the recliner to go to bed and struck the back of his head  Is on ASA  No LOC  No N/V  Denies headache or neck pain  Was last seen in this ED on 3/16/18 for traumatic hematoma of forehead  [facility] website checked on this patient and last Rx filled was on 18 for tramadol for 30 day supply  History provided by:  Patient, EMS personnel and nursing home   used: No        Prior to Admission Medications   Prescriptions Last Dose Informant Patient Reported? Taking? Cholecalciferol (VITAMIN D3) 2000 UNITS TABS  Self Yes No   Sig: Take 2,000 Units by mouth daily  Tamsulosin HCl (FLOMAX PO)  Self Yes No   Sig: Take 0 4 mg by mouth daily at bedtime  acetaminophen (TYLENOL) 325 mg tablet  Self No No   Sig: Take 2 tablets by mouth every 6 (six) hours as needed for mild pain   bisacodyl (DULCOLAX) 10 mg suppository  Self Yes No   Sig: Insert into the rectum as needed     cyanocobalamin (VITAMIN B-12) 1,000 mcg tablet  Self Yes No   Sig: Take 1,000 mcg by mouth daily  finasteride (PROSCAR) 5 mg tablet  Self Yes No   Sig: Take 5 mg by mouth daily  furosemide (LASIX) 20 mg tablet  Self Yes No   Sig: Take 10 mg by mouth every morning     gabapentin (NEURONTIN) 100 mg capsule  Self No No   Sig: Take 1 capsule by mouth daily at bedtime   magnesium hydroxide (MILK OF MAGNESIA) 400 mg/5 mL oral suspension  Self Yes No   Sig: Take 30 mL by mouth daily as needed for constipation   metoprolol tartrate (LOPRESSOR) 25 mg tablet  Self Yes No   Sig: Take by mouth   sodium chloride (OCEAN) 0 65 % nasal spray  Self Yes No   Si sprays into each nostril 3 (three) times a day     traMADol (ULTRAM) 50 mg tablet  Self Yes No Sig: Take 1 tablet by mouth every 8 (eight) hours as needed        Facility-Administered Medications: None       Past Medical History:   Diagnosis Date    Actinic keratosis     Anemia     chronic hemolytic anemia    Atrial fibrillation (HCC)     Benign prostatic hypertrophy     Blood in urine     Cellulitis     Dermatitis     Disease of thyroid gland     Dyslipidemia     Epidural hematoma (HCC)     Epistaxis     Falling     Folate deficiency     GERD (gastroesophageal reflux disease)     Hip fracture (HCC)     Hyperlipidemia     Hypertension     Hypothyroidism (acquired)     Insomnia     Neuropathy     Nocturia     Osteoarthritis     Psychiatric disorder     depression    Renal disorder     Acute renal failure    Sepsis (HCC)     Shoulder pain, right     Squamous cell carcinoma     Subdural hematoma (HCC)     Thrombophlebitis     Urinary retention        Past Surgical History:   Procedure Laterality Date    HIP SURGERY         History reviewed  No pertinent family history  I have reviewed and agree with the history as documented  Social History   Substance Use Topics    Smoking status: Former Smoker     Quit date: 2/4/1976    Smokeless tobacco: Never Used    Alcohol use No      Comment: once a week        Review of Systems   Gastrointestinal: Negative for nausea and vomiting  Musculoskeletal: Negative for neck pain  Neurological: Negative for headaches  All other systems reviewed and are negative  Physical Exam  ED Triage Vitals [04/04/18 2302]   Temperature Pulse Respirations Blood Pressure SpO2   97 9 °F (36 6 °C) 55 20 161/79 99 %      Temp Source Heart Rate Source Patient Position - Orthostatic VS BP Location FiO2 (%)   Oral -- -- -- --      Pain Score       No Pain           Orthostatic Vital Signs  Vitals:    04/04/18 2302   BP: 161/79   Pulse: 55       Physical Exam   Constitutional: He is oriented to person, place, and time   He appears well-developed and well-nourished  He appears distressed (minimal)  HENT:   Head: Normocephalic  Mouth/Throat: Oropharynx is clear and moist    No posterior scalp swelling or tenderness     Eyes: EOM are normal  Pupils are equal, round, and reactive to light  No scleral icterus  Neck: Normal range of motion  Neck supple  No tracheal deviation present  Cardiovascular: Normal heart sounds  No murmur heard  Irregularly irregular bradycardia  Pulmonary/Chest: Effort normal and breath sounds normal  No stridor  No respiratory distress  Abdominal: Soft  Bowel sounds are normal  There is no tenderness  Musculoskeletal: He exhibits no edema or tenderness  Neurological: He is alert and oriented to person, place, and time  Skin: Skin is warm and dry  No rash noted  Old facial ecchymosis  Psychiatric: He has a normal mood and affect  Nursing note and vitals reviewed  ED Medications  Medications - No data to display    Diagnostic Studies  Results Reviewed     None                 CT cervical spine without contrast   ED Interpretation by Caralee Essex, MD (04/05 0215)   COMPARISON:   No relevant prior studies available  FINDINGS:   Vertebrae: No evidence of acute cervical spine fracture or subluxation  There is a curvature of the   cervical spine, apex to the left  Discs/spinal canal/neural foramina: There is multilevel degenerative disc disease and facet and   uncovertebral joint hypertrophy  There is multilevel bilateral neural foraminal stenosis  Soft tissues: The prevertebral soft tissues appear within normal limits  Lung apices: There is interstitial prominence at the lung apices  IMPRESSION:   No evidence seen for acute cervical spine fracture  Cervical spondylosis  Thank you for allowing us to participate in the care of your patient     Dictated and Authenticated by: Génesis Roberts MD   04/05/2018 2:11 AM Eastern Time ([id number])      CT head without contrast   ED Interpretation by Hector Hood MD (04/05 0214)   COMPARISON:   No relevant prior studies available  FINDINGS:   Brain: There is no CT evidence of acute intracranial hemorrhage or acute vascular territorial infarct  There is no significant mass effect or midline shift  Patchy hypodensity is seen in the periventricular   and subcortical cerebral white matter  This change is nonspecific but some is likely secondary to   chronic ischemia within microvascular distributions  Ventricles: There is proportionate prominence of sulci and ventricles, compatible with age related   involutional change  Bones/joints: The skull base and calvaria appear intact  No acute fracture  Soft tissues: Unremarkable  Vasculature: Intracranial vascular calcifications are noted  Sinuses: The visualized paranasal sinuses are well-aerated  Mastoid air cells: The mastoid air cells are clear  IMPRESSION:   No evidence of acute intracranial abnormality  Procedures  Procedures       Phone Contacts  ED Phone Contact    ED Course  ED Course                                MDM  Number of Diagnoses or Management Options  Diagnosis management comments: DDx including but not limited to: intracranial injury, cervical injury; doubt intrathoracic injury, intraabdominal injury, extremity injury          Amount and/or Complexity of Data Reviewed  Tests in the radiology section of CPT®: ordered and reviewed  Decide to obtain previous medical records or to obtain history from someone other than the patient: yes  Obtain history from someone other than the patient: yes  Review and summarize past medical records: yes      CritCare Time    Disposition  Final diagnoses:   Injury of head, initial encounter     Time reflects when diagnosis was documented in both MDM as applicable and the Disposition within this note     Time User Action Codes Description Comment    4/5/2018  2:15 AM Mayelin Del Valle Add [S09 90XA] Injury of head, initial encounter       ED Disposition     ED Disposition Condition Comment    Discharge  Grant Crockett discharge to home/self care  Condition at discharge: Stable        Follow-up Information     Follow up With Specialties Details Why Adonay Josue MD  Call in 1 day tylenol for pain  Return sooner if increased pain, vomiting,lethargy, difficulty breathing, weakness, dizziness  [address] [place] [id number]  [phone]          Patient's Medications   Discharge Prescriptions    No medications on file     No discharge procedures on file      ED Provider  Electronically Signed by           Suhas Silverman MD  04/05/18 8614

## 2018-05-02 ENCOUNTER — HOSPITAL ENCOUNTER (EMERGENCY)
Facility: HOSPITAL | Age: 83
Discharge: HOME | End: 2018-05-02
Attending: EMERGENCY MEDICINE
Payer: MEDICARE

## 2018-05-02 ENCOUNTER — APPOINTMENT (EMERGENCY)
Dept: CT IMAGING | Facility: HOSPITAL | Age: 83
End: 2018-05-02
Payer: MEDICARE

## 2018-05-02 VITALS
OXYGEN SATURATION: 98 % | DIASTOLIC BLOOD PRESSURE: 86 MMHG | HEART RATE: 52 BPM | SYSTOLIC BLOOD PRESSURE: 168 MMHG | TEMPERATURE: 98.2 F | WEIGHT: 179.23 LBS | RESPIRATION RATE: 18 BRPM | BODY MASS INDEX: 25.72 KG/M2
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
## 2018-05-02 PROCEDURE — 99285 EMERGENCY DEPT VISIT HI MDM: CPT

## 2018-05-02 NOTE — DISCHARGE INSTRUCTIONS
Fall Prevention   WHAT YOU NEED TO KNOW:   Fall prevention includes ways to make your home and other areas safer  It also includes ways you can move more carefully to prevent a fall  Health conditions that cause changes in your blood pressure, vision, or muscle strength and coordination may increase your risk for falls  Medicines may also increase your risk for falls if they make you dizzy, weak, or sleepy  DISCHARGE INSTRUCTIONS:   Call 911 or have someone else call if:   · You have fallen and are unconscious  · You have fallen and cannot move part of your body  Contact your healthcare provider if:   · You have fallen and have pain or a headache  · You have questions or concerns about your condition or care  Fall prevention tips:   · Stand or sit up slowly  This may help you keep your balance and prevent falls  · Use assistive devices as directed  Your healthcare provider may suggest that you use a cane or walker to help you keep your balance  You may need to have grab bars put in your bathroom near the toilet or in the shower  · Wear shoes that fit well and have soles that   Wear shoes both inside and outside  Use slippers with good   Do not wear shoes with high heels  · Wear a personal alarm  This is a device that allows you to call 911 if you fall and need help  Ask your healthcare provider for more information  · Stay active  Exercise can help strengthen your muscles and improve your balance  Your healthcare provider may recommend water aerobics or walking  He or she may also recommend physical therapy to improve your coordination  Never start an exercise program without talking to your healthcare provider first      · Manage your medical conditions  Keep all appointments with your healthcare providers  Visit your eye doctor as directed  Home safety tips:   · Add items to prevent falls in the bathroom    Put nonslip strips on your bath or shower floor to prevent you from slipping  Use a bath mat if you do not have carpet in the bathroom  This will prevent you from falling when you step out of the bath or shower  Use a shower seat so you do not need to stand while you shower  Sit on the toilet or a chair in your bathroom to dry yourself and put on clothing  This will prevent you from losing your balance from drying or dressing yourself while you are standing  · Keep paths clear  Remove books, shoes, and other objects from walkways and stairs  Place cords for telephones and lamps out of the way so that you do not need to walk over them  Tape them down if you cannot move them  Remove small rugs  If you cannot remove a rug, secure it with double-sided tape  This will prevent you from tripping  · Install bright lights in your home  Use night lights to help light paths to the bathroom or kitchen  Always turn on the light before you start walking  · Keep items you use often on shelves within reach  Do not use a step stool to help you reach an item  · Paint or place reflective tape on the edges of your stairs  This will help you see the stairs better  Follow up with your healthcare provider as directed:  Write down your questions so you remember to ask them during your visits  © 2017 [address] [employer] Information is for End User's use only and may not be sold, redistributed or otherwise used for commercial purposes  All illustrations and images included in CareNotes® are the copyrighted property of [employer]  or [employer] Alcant[employer]  The above information is an  only  It is not intended as medical advice for individual conditions or treatments  Talk to your doctor, nurse or pharmacist before following any medical regimen to see if it is safe and effective for you

## 2018-05-02 NOTE — ED NOTES
Pt appeared to be in no acute distress upon discharge  Verbal understanding obtained from pt and family at bedside on d/c instructions as well as follow up care  Pt transported back to [facility] by lenny twzuly Pardo RN  05/02/18 9540

## 2018-05-02 NOTE — ED PROVIDER NOTES
History  Chief Complaint   Patient presents with    Fall     Per EMS, pt had unwitness falls, pt deneis head injury, no thinners  denies any complaints, [facility] sent for post fall eval      51-year-old male presents to the emergency department after falling at the nursing home  Patient states that he was attempting to go have breakfast when he sat back in his wheelchair from a standing position  Patient did not realize that the wheelchair was unlocked, he lost his balance and fell to the floor  He recalls striking the back of his head on the soft part of the wheelchair seat  Patient denies injuries  No headache, neck pain, or loss of consciousness  Patient does take aspirin  Upon review of medical records patient has had prior head injuries with chronic subdural hematomas  His daughter is at the bedside states that he had multiple falls while taking blood thinners and a blood thinner was discontinued a few years ago  Patient's daughter states that the patient is acting normally  He did not strike his head on a hard surface and he has a normal neurologic exam, no signs of trauma and no headache  He is able to provide details of the fall  Patient's daughter would like to forego CT imaging if possible as patient has had numerous CT scans in the past   I feel that this is a reasonable approach as the patient has no sign of injury and a normal neurologic exam         History provided by:  Patient, relative and medical records   used: No    Fall   Mechanism of injury: fall    Injury location: no injury  Incident location:  [facility] [place]  Arrived directly from scene: yes    Fall:     Fall occurred:  Standing    Impact surface: soft seat of wheelchair      Point of impact:  Head  Suspicion of alcohol use: no    Suspicion of drug use: no    Prior to arrival data:     Orientation at scene:  Person, place and situation    Loss of consciousness: no      Amnesic to event: no      Airway condition since incident:  Stable    Breathing condition since incident:  Stable    Circulation condition since incident:  Stable    Mental status condition since incident:  Stable    Disability condition since incident:  Stable  Associated symptoms: no headaches    Risk factors: no anticoagulation therapy        Prior to Admission Medications   Prescriptions Last Dose Informant Patient Reported? Taking? Cholecalciferol (VITAMIN D3) 2000 UNITS TABS  Self Yes No   Sig: Take 2,000 Units by mouth daily  Tamsulosin HCl (FLOMAX PO)  Self Yes No   Sig: Take 0 4 mg by mouth daily at bedtime  acetaminophen (TYLENOL) 325 mg tablet  Self No No   Sig: Take 2 tablets by mouth every 6 (six) hours as needed for mild pain   bisacodyl (DULCOLAX) 10 mg suppository  Self Yes No   Sig: Insert into the rectum as needed     cyanocobalamin (VITAMIN B-12) 1,000 mcg tablet  Self Yes No   Sig: Take 1,000 mcg by mouth daily  finasteride (PROSCAR) 5 mg tablet  Self Yes No   Sig: Take 5 mg by mouth daily  furosemide (LASIX) 20 mg tablet  Self Yes No   Sig: Take 10 mg by mouth every morning     gabapentin (NEURONTIN) 100 mg capsule  Self No No   Sig: Take 1 capsule by mouth daily at bedtime   magnesium hydroxide (MILK OF MAGNESIA) 400 mg/5 mL oral suspension  Self Yes No   Sig: Take 30 mL by mouth daily as needed for constipation   metoprolol tartrate (LOPRESSOR) 25 mg tablet  Self Yes No   Sig: Take by mouth   sodium chloride (OCEAN) 0 65 % nasal spray  Self Yes No   Si sprays into each nostril 3 (three) times a day     traMADol (ULTRAM) 50 mg tablet  Self Yes No   Sig: Take 1 tablet by mouth every 8 (eight) hours as needed        Facility-Administered Medications: None       Past Medical History:   Diagnosis Date    Actinic keratosis     Anemia     chronic hemolytic anemia    Atrial fibrillation (HCC)     Benign prostatic hypertrophy     Blood in urine     Cellulitis     Dermatitis     Disease of thyroid gland     Dyslipidemia     Epidural hematoma (HCC)     Epistaxis     Falling     Folate deficiency     GERD (gastroesophageal reflux disease)     Hip fracture (HCC)     Hyperlipidemia     Hypertension     Hypothyroidism (acquired)     Insomnia     Neuropathy     Nocturia     Osteoarthritis     Psychiatric disorder     depression    Renal disorder     Acute renal failure    Sepsis (HCC)     Shoulder pain, right     Squamous cell carcinoma     Subdural hematoma (HCC)     Thrombophlebitis     Urinary retention        Past Surgical History:   Procedure Laterality Date    HIP SURGERY         History reviewed  No pertinent family history  I have reviewed and agree with the history as documented  Social History   Substance Use Topics    Smoking status: Former Smoker     Quit date: 2/4/1976    Smokeless tobacco: Never Used    Alcohol use No      Comment: once a week        Review of Systems   Constitutional: Negative for appetite change and fever  Musculoskeletal: Positive for gait problem  Neurological: Positive for weakness  Negative for dizziness, syncope, speech difficulty and headaches  Hematological: Bruises/bleeds easily  All other systems reviewed and are negative  Physical Exam  ED Triage Vitals   Temperature Pulse Respirations Blood Pressure SpO2   05/02/18 0801 05/02/18 0801 05/02/18 0801 05/02/18 0805 05/02/18 0801   98 2 °F (36 8 °C) (!) 52 18 168/86 98 %      Temp Source Heart Rate Source Patient Position - Orthostatic VS BP Location FiO2 (%)   05/02/18 0801 05/02/18 0801 05/02/18 0805 05/02/18 0805 --   Oral Monitor Sitting Left arm       Pain Score       05/02/18 0801       2           Orthostatic Vital Signs  Vitals:    05/02/18 0801 05/02/18 0805   BP:  168/86   Pulse: (!) 52    Patient Position - Orthostatic VS:  Sitting       Physical Exam   Constitutional: He is oriented to person, place, and time  Vital signs are normal  He appears well-developed and well-nourished   No distress  HENT:   Head: Normocephalic and atraumatic  Eyes: Conjunctivae and EOM are normal  Pupils are equal, round, and reactive to light  Neck: Normal range of motion  Neck supple  Cardiovascular: Regular rhythm, normal heart sounds and intact distal pulses  Occasional extrasystoles are present  Bradycardia present  Pulmonary/Chest: Effort normal and breath sounds normal  No accessory muscle usage  No respiratory distress  He exhibits no tenderness  Abdominal: Soft  Normal appearance and bowel sounds are normal  He exhibits no distension  There is no tenderness  There is no rebound and no guarding  Musculoskeletal: Normal range of motion  He exhibits no edema, tenderness or deformity  Lymphadenopathy:     He has no cervical adenopathy  Neurological: He is alert and oriented to person, place, and time  He has normal strength and normal reflexes  Coordination normal    Skin: Skin is warm, dry and intact  No rash noted  Psychiatric: He has a normal mood and affect  His behavior is normal  Judgment and thought content normal    Nursing note and vitals reviewed        ED Medications  Medications - No data to display    Diagnostic Studies  Results Reviewed     None                 No orders to display              Procedures  Procedures       Phone Contacts  ED Phone Contact    ED Course                               MDM  Number of Diagnoses or Management Options  Fall from standing: new and requires workup     Amount and/or Complexity of Data Reviewed  Decide to obtain previous medical records or to obtain history from someone other than the patient: yes  Obtain history from someone other than the patient: yes    Patient Progress  Patient progress: stable    CritCare Time    Disposition  Final diagnoses:   Fall from standing     Time reflects when diagnosis was documented in both MDM as applicable and the Disposition within this note     Time User Action Codes Description Comment    5/2/2018 8:34 AM Rhona Bright Add [Y69  XXXA] Fall from standing       ED Disposition     ED Disposition Condition Comment    Discharge  Barbara Velasco discharge to home/self care  Condition at discharge: Stable        Follow-up Information     Follow up With Specialties Details Why [address] Amelie Velasco MD  Schedule an appointment as soon as possible for a visit in 1 day For recheck of current symptoms, As needed [address] [place] [address]  [phone]          Discharge Medication List as of 5/2/2018  9:10 AM      CONTINUE these medications which have NOT CHANGED    Details   acetaminophen (TYLENOL) 325 mg tablet Take 2 tablets by mouth every 6 (six) hours as needed for mild pain, Starting Sat 12/16/2017, Print      bisacodyl (DULCOLAX) 10 mg suppository Insert into the rectum as needed  , Historical Med      Cholecalciferol (VITAMIN D3) 2000 UNITS TABS Take 2,000 Units by mouth daily  , Historical Med      cyanocobalamin (VITAMIN B-12) 1,000 mcg tablet Take 1,000 mcg by mouth daily  , Historical Med      finasteride (PROSCAR) 5 mg tablet Take 5 mg by mouth daily  , Historical Med      furosemide (LASIX) 20 mg tablet Take 10 mg by mouth every morning  , Historical Med      gabapentin (NEURONTIN) 100 mg capsule Take 1 capsule by mouth daily at bedtime, Starting Sat 12/16/2017, Print      magnesium hydroxide (MILK OF MAGNESIA) 400 mg/5 mL oral suspension Take 30 mL by mouth daily as needed for constipation, Historical Med      metoprolol tartrate (LOPRESSOR) 25 mg tablet Take by mouth, Starting Thu 3/16/2017, Historical Med      sodium chloride (OCEAN) 0 65 % nasal spray 2 sprays into each nostril 3 (three) times a day , Historical Med      Tamsulosin HCl (FLOMAX PO) Take 0 4 mg by mouth daily at bedtime  , Historical Med      traMADol (ULTRAM) 50 mg tablet Take 1 tablet by mouth every 8 (eight) hours as needed  , Historical Med           No discharge procedures on file      ED Provider  Electronically Signed by           Shell Head, DO  05/07/18 7339

## 2018-05-02 NOTE — ED NOTES
Pt appears to be resting comfortably in bed at this time  Family at bedside refusing further work up  Plan of care, set up transport home  [facility] aware, attempting to set up       Jacque Yuan RN  05/02/18 9750

## 2018-05-27 ENCOUNTER — HOSPITAL ENCOUNTER (EMERGENCY)
Facility: HOSPITAL | Age: 83
Discharge: HOME | End: 2018-05-27
Attending: EMERGENCY MEDICINE | Admitting: EMERGENCY MEDICINE
Payer: MEDICARE

## 2018-05-27 VITALS
SYSTOLIC BLOOD PRESSURE: 189 MMHG | RESPIRATION RATE: 18 BRPM | BODY MASS INDEX: 23.67 KG/M2 | WEIGHT: 165.34 LBS | HEART RATE: 62 BPM | DIASTOLIC BLOOD PRESSURE: 87 MMHG | TEMPERATURE: 98.5 F | HEIGHT: 70 IN | OXYGEN SATURATION: 95 %
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
## 2018-05-27 DIAGNOSIS — S51.811A SKIN TEAR OF RIGHT FOREARM WITHOUT COMPLICATION, INITIAL ENCOUNTER: ICD-10-CM

## 2018-05-27 PROCEDURE — 99283 EMERGENCY DEPT VISIT LOW MDM: CPT

## 2018-05-27 RX ORDER — IBUPROFEN 200 MG
1 TABLET ORAL
COMMUNITY
End: 2018-11-06

## 2018-05-27 RX ORDER — ASPIRIN 81 MG/1
81 TABLET ORAL DAILY
COMMUNITY
End: 2019-04-11 | Stop reason: ALTCHOICE

## 2018-05-27 RX ORDER — LORATADINE 10 MG/1
10 TABLET ORAL DAILY PRN
COMMUNITY
End: 2019-01-16

## 2018-05-27 RX ORDER — BENZONATATE 100 MG/1
100 CAPSULE ORAL EVERY 6 HOURS PRN
COMMUNITY
End: 2019-04-11 | Stop reason: ALTCHOICE

## 2018-05-27 NOTE — ED NOTES
SLETs to  pt for transport back to [facility]        Rob Lazo RN  05/27/18 9739

## 2018-05-27 NOTE — ED PROVIDER NOTES
History  Chief Complaint   Patient presents with    Fall     Pt presents via EMS for fall while finishing up taking a shower without assistance this morning  Pt reportedly fell to R side and has new skin tear to R arm  Denies any other complaints and states he did not strike his head  Does not take any anticoagulants  Patient is a 80year old male who slipped and fell while taking a shower this AM  No dizziness prior to fall  No head injury  No N/V  No LOC  (+) new skin tear of R forearm  Has old skin tears of R UE and dressings in place on all of them  Patient states abx ointment was placed as well  Denies anticoagulant use  Denies pain or other injuries  Last Tdap was 1/19/18  Was last seen in this ED on 5/2/18 for fall  [facility] website checked on this patient and last Rx filled was on 4/23/18 for MAPAP for 11 day supply  History provided by:  Patient, EMS personnel and nursing home   used: No        Prior to Admission Medications   Prescriptions Last Dose Informant Patient Reported? Taking? Cholecalciferol (VITAMIN D3) 2000 UNITS TABS  Self Yes No   Sig: Take 2,000 Units by mouth daily  Tamsulosin HCl (FLOMAX PO)  Self Yes No   Sig: Take 0 4 mg by mouth daily at bedtime  acetaminophen (TYLENOL) 325 mg tablet  Self No No   Sig: Take 2 tablets by mouth every 6 (six) hours as needed for mild pain   bisacodyl (DULCOLAX) 10 mg suppository  Self Yes No   Sig: Insert into the rectum as needed     cyanocobalamin (VITAMIN B-12) 1,000 mcg tablet  Self Yes No   Sig: Take 1,000 mcg by mouth daily  finasteride (PROSCAR) 5 mg tablet  Self Yes No   Sig: Take 5 mg by mouth daily     furosemide (LASIX) 20 mg tablet  Self Yes No   Sig: Take 10 mg by mouth every morning     gabapentin (NEURONTIN) 100 mg capsule  Self No No   Sig: Take 1 capsule by mouth daily at bedtime   magnesium hydroxide (MILK OF MAGNESIA) 400 mg/5 mL oral suspension  Self Yes No   Sig: Take 30 mL by mouth daily as needed for constipation   metoprolol tartrate (LOPRESSOR) 25 mg tablet  Self Yes No   Sig: Take by mouth   sodium chloride (OCEAN) 0 65 % nasal spray  Self Yes No   Si sprays into each nostril 3 (three) times a day  traMADol (ULTRAM) 50 mg tablet  Self Yes No   Sig: Take 1 tablet by mouth every 8 (eight) hours as needed        Facility-Administered Medications: None       Past Medical History:   Diagnosis Date    Actinic keratosis     Anemia     chronic hemolytic anemia    Atrial fibrillation (HCC)     Benign prostatic hypertrophy     Blood in urine     Cellulitis     Dermatitis     Disease of thyroid gland     Dyslipidemia     Epidural hematoma (HCC)     Epistaxis     Falling     Folate deficiency     GERD (gastroesophageal reflux disease)     Hip fracture (HCC)     Hyperlipidemia     Hypertension     Hypothyroidism (acquired)     Insomnia     Neuropathy     Nocturia     Osteoarthritis     Psychiatric disorder     depression    Renal disorder     Acute renal failure    Sepsis (HCC)     Shoulder pain, right     Squamous cell carcinoma     Subdural hematoma (HCC)     Thrombophlebitis     Urinary retention        Past Surgical History:   Procedure Laterality Date    HIP SURGERY         No family history on file  I have reviewed and agree with the history as documented  Social History   Substance Use Topics    Smoking status: Former Smoker     Quit date: 1976    Smokeless tobacco: Never Used    Alcohol use No      Comment: once a week        Review of Systems   Gastrointestinal: Negative for nausea and vomiting  Skin: Positive for wound (skin tear)  Neurological: Negative for headaches  All other systems reviewed and are negative  Physical Exam  Physical Exam   Constitutional: He is oriented to person, place, and time  No distress  HENT:   Head: Normocephalic and atraumatic     Mouth/Throat: Oropharynx is clear and moist    Eyes: EOM are normal  Pupils are equal, round, and reactive to light  No scleral icterus  Neck: Normal range of motion  Neck supple  Cardiovascular: Normal rate  Murmur heard  Irregularly irregular rhythm  Pulmonary/Chest: Effort normal and breath sounds normal  No stridor  No respiratory distress  Abdominal: Soft  Bowel sounds are normal  He exhibits no distension  There is no tenderness  Musculoskeletal: He exhibits no edema, tenderness or deformity  Neurological: He is alert and oriented to person, place, and time  Skin: Skin is warm and dry  No rash noted  No erythema    (+) dressings over skin tears on R UE  No evidence of infection noted  Psychiatric: He has a normal mood and affect  Nursing note and vitals reviewed  Vital Signs  ED Triage Vitals [05/27/18 0632]   Temperature Pulse Respirations Blood Pressure SpO2   98 5 °F (36 9 °C) 62 18 (!) 189/87 95 %      Temp Source Heart Rate Source Patient Position - Orthostatic VS BP Location FiO2 (%)   Oral Monitor Lying Left arm --      Pain Score       No Pain           Vitals:    05/27/18 0632   BP: (!) 189/87   Pulse: 62   Patient Position - Orthostatic VS: Lying       Visual Acuity      ED Medications  Medications - No data to display    Diagnostic Studies  Results Reviewed     None                 No orders to display              Procedures  Procedures       Phone Contacts  ED Phone Contact    ED Course                               MDM  Number of Diagnoses or Management Options  Diagnosis management comments: DDx including but not limited to: Doubt intracranial injury, cervical injury, intrathoracic injury, intraabdominal injury; extremity injury--contusion, sprain, strain, skin tears; doubt fx or dislocation          Amount and/or Complexity of Data Reviewed  Decide to obtain previous medical records or to obtain history from someone other than the patient: yes  Obtain history from someone other than the patient: yes  Review and summarize past medical records: yes      CritCare Time    Disposition  Final diagnoses:   Fall, initial encounter   Skin tear of right forearm without complication, initial encounter     Time reflects when diagnosis was documented in both MDM as applicable and the Disposition within this note     Time User Action Codes Description Comment    5/27/2018  6:44 AM Rodney Kevin Add [P84  GSMT] Fall, initial encounter     5/27/2018  6:44 AM Rodney Kevin Add [[id number]] Skin tear of right forearm without complication, initial encounter       ED Disposition     ED Disposition Condition Comment    Discharge  [facility] Keira discharge to home/self care  Condition at discharge: Stable        Follow-up Information     Follow up With Specialties Details Why Adonay Josue MD Internal Medicine Call in 2 days for repeat blood pressure within 1 week  Tylenol for pain  Return sooner if pain, fever, redness, red streaks ,swelling, numbness, weakness, lethargy  [address] [place] [address] [place] [address]  [phone]            Patient's Medications   Discharge Prescriptions    No medications on file     No discharge procedures on file      ED Provider  Electronically Signed by           Suhas Silverman MD  05/27/18 2178

## 2018-05-27 NOTE — DISCHARGE INSTRUCTIONS
Skin Tear   WHAT YOU NEED TO KNOW:   A skin tear occurs when the layers of weakened skin split open from an injury  , elderly, and chronically or critically ill people are at higher risk for skin tears  Long-term use of steroids can also increase the risk  It is important to treat and prevent skin tears to prevent infection  DISCHARGE INSTRUCTIONS:   Medicines:   · Medicines  may be given to reduce your pain or to treat or prevent an infection  Do not wait until the pain is severe before you ask for more medicine  · Take your medicine as directed  Contact your healthcare provider if you think your medicine is not helping or if you have side effects  Tell him of her if you are allergic to any medicine  Keep a list of the medicines, vitamins, and herbs you take  Include the amounts, and when and why you take them  Bring the list or the pill bottles to follow-up visits  Carry your medicine list with you in case of an emergency  Follow up with your healthcare provider as directed:  Write down your questions so you remember to ask them during your visits  Wound care: You may be referred to a wound specialist who will teach you how to clean your wound properly  Ask for more information about wound care  Prevent another skin tear:   · Clean, moisturize, and protect your skin  Baths, hot showers, and soap can dry your skin and increase your risk for skin tears  Take tepid showers, use mild soap as directed, and gently pat your skin dry  Use lotion to keep your skin moist after you shower  Wear long sleeves, pants, and protective footwear  · Move carefully  Ask for help if you cannot lift yourself well enough to avoid dragging your skin when you move  · Keep your home safe  Cover sharp corners, keep your pathways clear, and turn on lights so you can see clearly  Ask for more information if you have questions about home safety  · Drink liquids as directed    Ask your healthcare provider how much liquid to drink each day and which liquids are best for you  Liquids will help keep your skin moist and protected from future skin tears  · Eat high-protein foods  Examples are lean meats, fish, low-fat dairy products, and beans  A dietitian may help you create high-protein meal plans to help with wound healing  Contact your healthcare provider if:   · You have redness, swelling, pus, or a bad odor coming from your wound  · Blood soaks through your bandage  · You have increased pain, even after you take pain medicine  · Your wound tears open again  Return to the emergency department if:   · You have a fever  © 2017 [address] Information is for End User's use only and may not be sold, redistributed or otherwise used for commercial purposes  All illustrations and images included in CareNotes® are the copyrighted property of A D A M , Inc  or Joe Alcantara  The above information is an  only  It is not intended as medical advice for individual conditions or treatments  Talk to your doctor, nurse or pharmacist before following any medical regimen to see if it is safe and effective for you  Fall Prevention for Older Adults   WHAT YOU NEED TO KNOW:   As you age, your muscles weaken and your risk for falls increases  Your risk also increases if you take medicines that make you sleepy or dizzy  You may also be at risk if you have vision or joint problems, have low blood pressure, or are not active  DISCHARGE INSTRUCTIONS:   Call 911 or have someone else call if:   · You have fallen and are unconscious  · You have fallen and cannot move part of your body  Contact your healthcare provider if:   · You have fallen and have pain or a headache  · You have questions or concerns about your condition or care  Fall prevention tips:   · Stay active  Exercise can help strengthen your muscles and improve your balance   Your healthcare provider may recommend water aerobics, walking, or Santi Chi  He may also recommend physical therapy to improve your coordination  Never start an exercise program without asking your healthcare provider first     · Wear shoes that fit well and have soles that   Wear shoes both inside and outside  Use slippers with good   Avoid shoes with high heels  · Use assistive devices as directed  Your healthcare provider may suggest that you use a cane or walker to help you keep your balance  You may need to have grab bars put in your bathroom near the toilet or in the shower  · Stand or sit up slowly  This may help you keep your balance and prevent falls  · Wear a personal alarm  This is a device that allows you to call 911 if you need help  Ask for more information on personal alarms  · Manage your medical conditions  Keep all appointments with your healthcare providers  Visit your eye doctor as directed  Home safety tips:   · Add items to prevent falls in the bathroom  Put nonslip strips on your bath or shower floor to prevent you from slipping  Use a bath mat if you do not have carpet in the bathroom  This will prevent you from falling when you step out of the bath or shower  Use a shower seat so you do not need to stand while you shower  Sit on the toilet or a chair in your bathroom to dry yourself and put on clothing  This will prevent you from losing your balance from drying or dressing yourself while you are standing  · Keep paths clear  Remove books, shoes, and other objects from walkways and stairs  Place cords for telephones and lamps out of the way so that you do not need to walk over them  Tape them down if you cannot move them  Remove small rugs  If you cannot remove a rug, secure it with double-sided tape  This will prevent you from tripping  · Install bright lights in your home  Use night lights to help light paths to the bathroom or kitchen   Always turn on the light before you start walking  · Keep items you use often on shelves within reach  Do not use a step stool to help you reach an item  · Paint or place reflective tape on the edges of your stairs  This will help you see the stairs better  Follow up with your healthcare provider as directed:  Write down your questions so you remember to ask them during your visits  © 2017 [address]  Information is for End User's use only and may not be sold, redistributed or otherwise used for commercial purposes  All illustrations and images included in CareNotes® are the copyrighted property of A D A [employer]  or Joe Alcantara  The above information is an  only  It is not intended as medical advice for individual conditions or treatments  Talk to your doctor, nurse or pharmacist before following any medical regimen to see if it is safe and effective for you

## 2018-09-05 ENCOUNTER — OFFICE VISIT (OUTPATIENT)
Dept: CARDIOLOGY CLINIC | Facility: CLINIC | Age: 83
End: 2018-09-05
Payer: COMMERCIAL

## 2018-09-05 VITALS — SYSTOLIC BLOOD PRESSURE: 122 MMHG | HEART RATE: 60 BPM | DIASTOLIC BLOOD PRESSURE: 64 MMHG | HEIGHT: 70 IN

## 2018-09-05 DIAGNOSIS — R26.2 AMBULATORY DYSFUNCTION: ICD-10-CM

## 2018-09-05 DIAGNOSIS — E78.2 MIXED HYPERLIPIDEMIA: Chronic | ICD-10-CM

## 2018-09-05 DIAGNOSIS — I48.19 PERSISTENT ATRIAL FIBRILLATION (HCC): Primary | Chronic | ICD-10-CM

## 2018-09-05 DIAGNOSIS — I10 ESSENTIAL HYPERTENSION: Chronic | ICD-10-CM

## 2018-09-05 DIAGNOSIS — Z86.79 HISTORY OF SUBDURAL HEMATOMA: Chronic | ICD-10-CM

## 2018-09-05 PROCEDURE — 99214 OFFICE O/P EST MOD 30 MIN: CPT | Performed by: INTERNAL MEDICINE

## 2018-09-05 PROCEDURE — 93000 ELECTROCARDIOGRAM COMPLETE: CPT | Performed by: INTERNAL MEDICINE

## 2018-09-05 NOTE — PROGRESS NOTES
Cardiology Follow Up    Herson Dumont  1/15/1929  [id number]  [address] [facility]  [address] [place] [address] [address]    1  Persistent atrial fibrillation (HCC)  POCT ECG   2  Essential hypertension  POCT ECG   3  Mixed hyperlipidemia     4  History of subdural hematoma     5  Ambulatory dysfunction         Discussion/Summary:  He continues to have occasional falls and remains off anticoagulation  He has not had any syncope  Blood pressures been controlled  Atrial fibrillation has been rate controlled  He is off of any anti-platelet agents until cleared from Neurosurgery  He has a worsening murmur of aortic stenosis on exam I have ordered no further testing as the patient is of advanced age and quite deconditioned and would like conservative therapy only  Interval History:  80year-old gentle with a history of persistent atrial fibrillation off anticoagulation secondary to multiple falls and subdural hematoma, hypertension, hyperlipidemia presents for follow-up visit  Denies chest pain, shortness of breath, palpitations, lightheadedness, dizziness, or syncope  He denies any lower extremity edema, PND, orthopnea  He is quite deconditioned he has a bedsore now  Functional capacity has been declining with age      Problem List     Subdural hematoma (HCC)    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[facility] [address] )    Atrial fibrillation (HCC) (Chronic)    Overview Signed 2/6/2018  7:45 AM by Felipe Hannah DO     Description: Paroxysmal, on chronic anticoagulation         History of coagulopathy    Hypothyroidism (Chronic)    Chronic anemia (Chronic)    Hypertension (Chronic)    Hyperlipidemia (Chronic)    BPH (benign prostatic hyperplasia) (Chronic)    History of subdural hematoma (Chronic)    Overview Signed 2/4/2016 10:12 AM by Clementina Mai PA-C     January 2015         Osteoarthritis (Chronic)    Ambulatory dysfunction Hypoalbuminemia due to protein-calorie malnutrition (HCC)    Benign prostatic hypertrophy    Hypothyroidism (acquired)    Closed fracture of second lumbar vertebra with routine healing    Subarachnoid bleed (HCC)    Acute midline low back pain with sciatica    Dyslipidemia    Leg edema        Past Medical History:   Diagnosis Date    Actinic keratosis     Anemia     chronic hemolytic anemia    Atrial fibrillation (HCC)     Benign prostatic hypertrophy     Blood in urine     Cellulitis     Dermatitis     Disease of thyroid gland     Dyslipidemia     Epidural hematoma (HCC)     Epistaxis     Falling     Folate deficiency     GERD (gastroesophageal reflux disease)     Hip fracture (HCC)     Hyperlipidemia     Hypertension     Hypothyroidism (acquired)     Insomnia     Neuropathy     Nocturia     Osteoarthritis     Psychiatric disorder     depression    Renal disorder     Acute renal failure    Sepsis ([address] )     Shoulder pain, right     Squamous cell carcinoma     Subdural hematoma (HCC)     Thrombophlebitis     Urinary retention      Social History     Social History    Marital status:      Spouse name: N/A    Number of children: N/A    Years of education: N/A     Occupational History    Not on file  Social History Main Topics    Smoking status: Former Smoker     Quit date: 2/4/1976    Smokeless tobacco: Never Used    Alcohol use No      Comment: once a week    Drug use: No    Sexual activity: Not on file     Other Topics Concern    Not on file     Social History Narrative    No narrative on file      No family history on file    Past Surgical History:   Procedure Laterality Date    HIP SURGERY         Current Outpatient Prescriptions:     acetaminophen (TYLENOL) 325 mg tablet, Take 2 tablets by mouth every 6 (six) hours as needed for mild pain, Disp: 30 tablet, Rfl: 0    aspirin (ECOTRIN LOW STRENGTH) 81 mg EC tablet, Take 81 mg by mouth daily, Disp: , Rfl:    benzonatate (TESSALON PERLES) 100 mg capsule, Take 100 mg by mouth every 6 (six) hours as needed for cough, Disp: , Rfl:     bisacodyl (DULCOLAX) 10 mg suppository, Insert into the rectum as needed  , Disp: , Rfl:     Cholecalciferol (VITAMIN D3) 2000 UNITS TABS, Take 2,000 Units by mouth daily  , Disp: , Rfl:     cyanocobalamin (VITAMIN B-12) 1,000 mcg tablet, Take 1,000 mcg by mouth daily  , Disp: , Rfl:     finasteride (PROSCAR) 5 mg tablet, Take 5 mg by mouth daily  , Disp: , Rfl:     furosemide (LASIX) 20 mg tablet, Take 10 mg by mouth every morning  , Disp: , Rfl:     gabapentin (NEURONTIN) 100 mg capsule, Take 1 capsule by mouth daily at bedtime, Disp: 5 capsule, Rfl: 0    loratadine (CLARITIN) 10 mg tablet, Take 10 mg by mouth daily as needed for allergies, Disp: , Rfl:     magnesium hydroxide (MILK OF MAGNESIA) 400 mg/5 mL oral suspension, Take 30 mL by mouth daily as needed for constipation, Disp: , Rfl:     neomycin-bacitracin-polymyxin (NEOSPORIN) 5-400-5,000 ointment, Apply 1 application topically every 3 (three) days With dressing and tegaderm until wound healed, Disp: , Rfl:     sodium chloride (OCEAN) 0 65 % nasal spray, 2 sprays into each nostril 3 (three) times a day , Disp: , Rfl:     Tamsulosin HCl (FLOMAX PO), Take 0 4 mg by mouth daily at bedtime  , Disp: , Rfl:   No Known Allergies    Labs:     Chemistry        Component Value Date/Time     12/15/2017 0614     01/26/2014 0555    K 3 8 12/15/2017 0614    K 4 0 01/26/2014 0555     12/15/2017 0614     01/26/2014 0555    CO2 26 12/15/2017 0614    CO2 27 01/26/2014 0555    BUN 22 12/15/2017 0614    BUN 18 01/26/2014 0555    CREATININE 1 01 12/15/2017 0614    CREATININE 1 27 01/26/2014 0555        Component Value Date/Time    CALCIUM 8 1 (L) 12/15/2017 0614    CALCIUM 7 9 (L) 01/26/2014 0555    ALKPHOS 45 (L) 12/15/2017 0614    ALKPHOS 100 01/22/2014 0008    AST 13 12/15/2017 0614    AST 21 01/22/2014 0008    ALT 12 12/15/2017 0614    ALT 16 01/22/2014 0008    BILITOT 0 3 01/22/2014 0008            No results found for: CHOL  No results found for: HDL  No results found for: LDLCALC  No results found for: TRIG  No components found for: CHOLHDL    Imaging: Xr Spine Thoracic 2 Vw    Result Date: 1/31/2018  Narrative: THORACIC SPINE INDICATION: Back pain with sciatica, unspecified side  History of fracture  COMPARISON: MRI thoracolumbar spine 12/14/2017 VIEWS:  Erect AP and lateral projections IMAGES:  3 FINDINGS: Thoracic dextroscoliosis with increased thoracic kyphosis  T9 and L1 fracture deformities described on recent MRI are not clearly appreciated by plain film  There is no discernible loss of vertebral body height  No other discernible fractures are seen  Bridging paravertebral ossifications are seen throughout the thoracic and upper lumbar spine  There is no displacement of the paraspinal line  The pedicles are difficult to evaluate on this study  Impression: Limited study  T9 and L1 fracture deformities described on recent MRI are not clearly appreciated by plain film  Thoracic dextroscoliosis with bridging ossifications throughout the visualized spine  Workstation performed: [id number]       ECG:  Atrial fibrillation left ventricular hypertrophy      Review of Systems   Constitution: Negative  HENT: Negative  Eyes: Negative  Cardiovascular: Negative  Respiratory: Negative  Endocrine: Negative  Hematologic/Lymphatic: Negative  Skin: Negative  Musculoskeletal: Negative  Gastrointestinal: Negative  Genitourinary: Negative  Neurological: Negative  Psychiatric/Behavioral: Negative  Vitals:    09/05/18 1436   BP: 122/64   Pulse: 60     Vitals:     Height: 5' 10" (177 8 cm)   There is no height or weight on file to calculate BMI  Physical Exam:   Vital signs reviewed  Physical Exam:  General appearance:  Appears stated age, alert, well appearing and in no distress  HEENT: PERRLA, EOMI, no scleral icterus, no conjunctival pallor  NECK:  Supple, No elevated JVP, no thyromegaly, no carotid bruits  HEART:  Irregular rhythm, normal S1/S2, no S3/S4, no murmur or rub  LUNGS:  Clear to auscultation bilaterally, no wheezes rales or rhonchi  ABDOMEN:  Soft, non-tender, positive bowel sounds, no rebound or guarding, no organomegaly   EXTREMITIES:  No edema, normal range of motion  VASCULAR:  Normal pedal pulses, good pulse volume   SKIN: No lesions or rashes on exposed skin  NEURO:  CN II-XII intact, no focal deficits

## 2018-10-09 ENCOUNTER — TELEPHONE (OUTPATIENT)
Dept: UROLOGY | Facility: CLINIC | Age: 83
End: 2018-10-09

## 2018-10-09 NOTE — TELEPHONE ENCOUNTER
New patient for hematuria  Reason for appointment/Complaint/Diagnosis : hematuria    Insurance: Medicare A/B    History of Cancer? yes                       If yes, what kind? Squamous cell carcinoma of skin    Previous urologist?     no                  Records requested/where? [facility] will send medical records    Outside testing/where? no    Location Preference for office visit? [facility] patient paperwork sent via mail

## 2018-10-09 NOTE — TELEPHONE ENCOUNTER
Patients medical records received at [facility]    faxed the records to central faxing team

## 2018-10-26 ENCOUNTER — OFFICE VISIT (OUTPATIENT)
Dept: UROLOGY | Facility: AMBULATORY SURGERY CENTER | Age: 83
End: 2018-10-26
Payer: MEDICARE

## 2018-10-26 VITALS
HEART RATE: 56 BPM | HEIGHT: 68 IN | SYSTOLIC BLOOD PRESSURE: 130 MMHG | WEIGHT: 187 LBS | BODY MASS INDEX: 28.34 KG/M2 | DIASTOLIC BLOOD PRESSURE: 60 MMHG

## 2018-10-26 DIAGNOSIS — R31.29 MICROSCOPIC HEMATURIA: Primary | ICD-10-CM

## 2018-10-26 PROCEDURE — 99203 OFFICE O/P NEW LOW 30 MIN: CPT | Performed by: UROLOGY

## 2018-10-26 NOTE — PROGRESS NOTES
10/26/2018    Hershall Dura  1/15/1929  [id number]        Assessment  Microscopic hematuria    Plan  I discussed the situation with the patient and his daughter who accompanied him  Urine dip tests are often inaccurate and may yield numerous false positive result  I do not recommend any further evaluation yet  He did have the ultrasound revealing normal urinary tract  He is doing well with urologic symptoms while taking tamsulosin and finasteride  I recommend checking a urinalysis with microscopic evaluation in the lab which can be collected at his nursing home  They will send us the results  If there is true microscopic hematuria with red blood cells, would then suggest cystoscopy to rule out bladder cancer  As long as there are no red blood cells identified, no further workup is necessary and then a follow-up only as needed  They are very comfortable with this plan  History of Present Illness  Malaika Berry is a 80 y o  male who resides at [facility]  He was referred for evaluation 1 8 urinalysis revealed trace blood in the urine on dipstick test   There have been mild voiding symptoms over the years  He has been taking tamsulosin and finasteride without much complaint  No complaints of abdominal, pelvic, or flank pain  An ultrasound was performed and reviewed, which reveals normal kidneys without postvoid residual   He does have a history of smoking many years ago  Review of Systems  Review of Systems   Constitutional: Negative  HENT: Negative  Respiratory: Negative  Cardiovascular: Negative  Gastrointestinal: Negative  Genitourinary:        As per HPI   Musculoskeletal: Negative  Skin: Negative  Neurological: Negative  Hematological: Negative            Past Medical History  Past Medical History:   Diagnosis Date    Actinic keratosis     Anemia     chronic hemolytic anemia    Atrial fibrillation (HCC)     Benign prostatic hypertrophy     Blood in urine     Cellulitis     Dermatitis     Disease of thyroid gland     Dyslipidemia     Epidural hematoma (HCC)     Epistaxis     Falling     Folate deficiency     GERD (gastroesophageal reflux disease)     Hip fracture (HCC)     Hyperlipidemia     Hypertension     Hypothyroidism (acquired)     Insomnia     Neuropathy     Nocturia     Osteoarthritis     Psychiatric disorder     depression    Renal disorder     Acute renal failure    Sepsis (HCC)     Shoulder pain, right     Squamous cell carcinoma     Subdural hematoma (HCC)     Thrombophlebitis     Urinary retention        Past Social History  Past Surgical History:   Procedure Laterality Date    HIP SURGERY         Past Family History  History reviewed  No pertinent family history  Past Social history  Social History     Social History    Marital status:      Spouse name: N/A    Number of children: N/A    Years of education: N/A     Occupational History    Not on file  Social History Main Topics    Smoking status: Former Smoker     Quit date: 2/4/1976    Smokeless tobacco: Never Used    Alcohol use No      Comment: once a week    Drug use: No    Sexual activity: Not on file     Other Topics Concern    Not on file     Social History Narrative    No narrative on file     History   Smoking Status    Former Smoker    Quit date: 2/4/1976   Smokeless Tobacco    Never Used       Current Medications  Current Outpatient Prescriptions   Medication Sig Dispense Refill    acetaminophen (TYLENOL) 325 mg tablet Take 2 tablets by mouth every 6 (six) hours as needed for mild pain 30 tablet 0    aspirin (ECOTRIN LOW STRENGTH) 81 mg EC tablet Take 81 mg by mouth daily      benzonatate (TESSALON PERLES) 100 mg capsule Take 100 mg by mouth every 6 (six) hours as needed for cough      Cholecalciferol (VITAMIN D3) 2000 UNITS TABS Take 2,000 Units by mouth daily        cyanocobalamin (VITAMIN B-12) 1,000 mcg tablet Take 1,000 mcg by mouth daily   finasteride (PROSCAR) 5 mg tablet Take 5 mg by mouth daily   furosemide (LASIX) 20 mg tablet Take 10 mg by mouth every morning        gabapentin (NEURONTIN) 100 mg capsule Take 1 capsule by mouth daily at bedtime 5 capsule 0    loratadine (CLARITIN) 10 mg tablet Take 10 mg by mouth daily as needed for allergies      magnesium hydroxide (MILK OF MAGNESIA) 400 mg/5 mL oral suspension Take 30 mL by mouth daily as needed for constipation      neomycin-bacitracin-polymyxin (NEOSPORIN) 5-400-5,000 ointment Apply 1 application topically every 3 (three) days With dressing and tegaderm until wound healed      sodium chloride (OCEAN) 0 65 % nasal spray 2 sprays into each nostril 3 (three) times a day   Tamsulosin HCl (FLOMAX PO) Take 0 4 mg by mouth daily at bedtime   bisacodyl (DULCOLAX) 10 mg suppository Insert into the rectum as needed         No current facility-administered medications for this visit  Allergies  No Known Allergies    Past Medical History, Social History, Family History, medications and allergies were reviewed  Vitals  Vitals:    10/26/18 0943   BP: 130/60   Pulse: 56   Weight: 84 8 kg (187 lb)   Height: 5' 8" (1 727 m)       Physical Exam  Physical Exam   Constitutional: He is oriented to person, place, and time  He appears well-developed and well-nourished  Elderly male in no distress  Cardiovascular: Normal rate  Pulmonary/Chest: Effort normal    Abdominal: Soft  Musculoskeletal:   Patient in wheelchair  Neurological: He is alert and oriented to person, place, and time  Skin: Skin is warm, dry and intact  Psychiatric: He has a normal mood and affect  Vitals reviewed          Results  No results found for: PSA  Lab Results   Component Value Date    GLUCOSE 129 01/26/2014    CALCIUM 8 1 (L) 12/15/2017     12/15/2017    K 3 8 12/15/2017    CO2 26 12/15/2017     12/15/2017    BUN 22 12/15/2017    CREATININE 1 01 12/15/2017 Lab Results   Component Value Date    WBC 8 19 12/14/2017    HGB 13 3 12/14/2017    HCT 39 8 12/14/2017    MCV 91 12/14/2017     (L) 12/14/2017

## 2018-10-26 NOTE — LETTER
October 26, 2018     Aleksandr Torres MD  [address] [facility] [address]    Patient: Keesha Puente   YOB: 1929   Date of Visit: 10/26/2018       Dear Dr Angie Orourke:    Thank you for referring Sapphire Gómez to me for evaluation  Below are my notes for this consultation  If you have questions, please do not hesitate to call me  I look forward to following your patient along with you  Sincerely,        Fernando Dahl MD        CC: No Recipients  Fernando Dahl MD  10/26/2018  2:59 PM  Sign at close encounter  10/26/2018    Keesha Puente  1/15/1929  [id number]        Assessment  Microscopic hematuria    Plan  I discussed the situation with the patient and his daughter who accompanied him  Urine dip tests are often inaccurate and may yield numerous false positive result  I do not recommend any further evaluation yet  He did have the ultrasound revealing normal urinary tract  He is doing well with urologic symptoms while taking tamsulosin and finasteride  I recommend checking a urinalysis with microscopic evaluation in the lab which can be collected at his nursing home  They will send us the results  If there is true microscopic hematuria with red blood cells, would then suggest cystoscopy to rule out bladder cancer  As long as there are no red blood cells identified, no further workup is necessary and then a follow-up only as needed  They are very comfortable with this plan  History of Present Illness  Nathan ragsdale is a 80 y o  male who resides at [facility]  He was referred for evaluation 1 8 urinalysis revealed trace blood in the urine on dipstick test   There have been mild voiding symptoms over the years  He has been taking tamsulosin and finasteride without much complaint  No complaints of abdominal, pelvic, or flank pain    An ultrasound was performed and reviewed, which reveals normal kidneys without postvoid residual   He does have a history of smoking many years ago  Review of Systems  Review of Systems   Constitutional: Negative  HENT: Negative  Respiratory: Negative  Cardiovascular: Negative  Gastrointestinal: Negative  Genitourinary:        As per HPI   Musculoskeletal: Negative  Skin: Negative  Neurological: Negative  Hematological: Negative  Past Medical History  Past Medical History:   Diagnosis Date    Actinic keratosis     Anemia     chronic hemolytic anemia    Atrial fibrillation (HCC)     Benign prostatic hypertrophy     Blood in urine     Cellulitis     Dermatitis     Disease of thyroid gland     Dyslipidemia     Epidural hematoma (HCC)     Epistaxis     Falling     Folate deficiency     GERD (gastroesophageal reflux disease)     Hip fracture (HCC)     Hyperlipidemia     Hypertension     Hypothyroidism (acquired)     Insomnia     Neuropathy     Nocturia     Osteoarthritis     Psychiatric disorder     depression    Renal disorder     Acute renal failure    Sepsis (HCC)     Shoulder pain, right     Squamous cell carcinoma     Subdural hematoma (HCC)     Thrombophlebitis     Urinary retention        Past Social History  Past Surgical History:   Procedure Laterality Date    HIP SURGERY         Past Family History  History reviewed  No pertinent family history  Past Social history  Social History     Social History    Marital status:      Spouse name: N/A    Number of children: N/A    Years of education: N/A     Occupational History    Not on file       Social History Main Topics    Smoking status: Former Smoker     Quit date: 2/4/1976    Smokeless tobacco: Never Used    Alcohol use No      Comment: once a week    Drug use: No    Sexual activity: Not on file     Other Topics Concern    Not on file     Social History Narrative    No narrative on file     History   Smoking Status    Former Smoker    Quit date: 2/4/1976   Smokeless Tobacco    Never Used       Current Medications  Current Outpatient Prescriptions   Medication Sig Dispense Refill    acetaminophen (TYLENOL) 325 mg tablet Take 2 tablets by mouth every 6 (six) hours as needed for mild pain 30 tablet 0    aspirin (ECOTRIN LOW STRENGTH) 81 mg EC tablet Take 81 mg by mouth daily      benzonatate (TESSALON PERLES) 100 mg capsule Take 100 mg by mouth every 6 (six) hours as needed for cough      Cholecalciferol (VITAMIN D3) 2000 UNITS TABS Take 2,000 Units by mouth daily   cyanocobalamin (VITAMIN B-12) 1,000 mcg tablet Take 1,000 mcg by mouth daily   finasteride (PROSCAR) 5 mg tablet Take 5 mg by mouth daily   furosemide (LASIX) 20 mg tablet Take 10 mg by mouth every morning        gabapentin (NEURONTIN) 100 mg capsule Take 1 capsule by mouth daily at bedtime 5 capsule 0    loratadine (CLARITIN) 10 mg tablet Take 10 mg by mouth daily as needed for allergies      magnesium hydroxide (MILK OF MAGNESIA) 400 mg/5 mL oral suspension Take 30 mL by mouth daily as needed for constipation      neomycin-bacitracin-polymyxin (NEOSPORIN) 5-400-5,000 ointment Apply 1 application topically every 3 (three) days With dressing and tegaderm until wound healed      sodium chloride (OCEAN) 0 65 % nasal spray 2 sprays into each nostril 3 (three) times a day   Tamsulosin HCl (FLOMAX PO) Take 0 4 mg by mouth daily at bedtime   bisacodyl (DULCOLAX) 10 mg suppository Insert into the rectum as needed         No current facility-administered medications for this visit  Allergies  No Known Allergies    Past Medical History, Social History, Family History, medications and allergies were reviewed  Vitals  Vitals:    10/26/18 0943   BP: 130/60   Pulse: 56   Weight: 84 8 kg (187 lb)   Height: 5' 8" (1 727 m)       Physical Exam  Physical Exam   Constitutional: He is oriented to person, place, and time  He appears well-developed and well-nourished  Elderly male in no distress  Cardiovascular: Normal rate  Pulmonary/Chest: Effort normal    Abdominal: Soft  Musculoskeletal:   Patient in wheelchair  Neurological: He is alert and oriented to person, place, and time  Skin: Skin is warm, dry and intact  Psychiatric: He has a normal mood and affect  Vitals reviewed          Results  No results found for: PSA  Lab Results   Component Value Date    GLUCOSE 129 01/26/2014    CALCIUM 8 1 (L) 12/15/2017     12/15/2017    K 3 8 12/15/2017    CO2 26 12/15/2017     12/15/2017    BUN 22 12/15/2017    CREATININE 1 01 12/15/2017     Lab Results   Component Value Date    WBC 8 19 12/14/2017    HGB 13 3 12/14/2017    HCT 39 8 12/14/2017    MCV 91 12/14/2017     (L) 12/14/2017

## 2018-11-06 ENCOUNTER — APPOINTMENT (EMERGENCY)
Dept: CT IMAGING | Facility: HOSPITAL | Age: 83
DRG: 552 | End: 2018-11-06
Payer: MEDICARE

## 2018-11-06 ENCOUNTER — HOSPITAL ENCOUNTER (EMERGENCY)
Facility: HOSPITAL | Age: 83
Discharge: HOME | DRG: 552 | End: 2018-11-06
Attending: EMERGENCY MEDICINE | Admitting: EMERGENCY MEDICINE
Payer: MEDICARE

## 2018-11-06 ENCOUNTER — APPOINTMENT (EMERGENCY)
Dept: RADIOLOGY | Facility: HOSPITAL | Age: 83
DRG: 552 | End: 2018-11-06
Payer: MEDICARE

## 2018-11-06 VITALS
WEIGHT: 195.11 LBS | DIASTOLIC BLOOD PRESSURE: 90 MMHG | TEMPERATURE: 97.7 F | OXYGEN SATURATION: 96 % | SYSTOLIC BLOOD PRESSURE: 143 MMHG | RESPIRATION RATE: 18 BRPM | BODY MASS INDEX: 29.67 KG/M2 | HEART RATE: 80 BPM

## 2018-11-06 DIAGNOSIS — S22.008A OTHER FRACTURE OF UNSPECIFIED THORACIC VERTEBRA, INITIAL ENCOUNTER FOR CLOSED FRACTURE (HCC): ICD-10-CM

## 2018-11-06 DIAGNOSIS — S32.000A LUMBAR COMPRESSION FRACTURE (HCC): Primary | ICD-10-CM

## 2018-11-06 PROCEDURE — 72131 CT LUMBAR SPINE W/O DYE: CPT

## 2018-11-06 PROCEDURE — 72100 X-RAY EXAM L-S SPINE 2/3 VWS: CPT

## 2018-11-06 PROCEDURE — 99285 EMERGENCY DEPT VISIT HI MDM: CPT

## 2018-11-06 PROCEDURE — 72080 X-RAY EXAM THORACOLMB 2/> VW: CPT

## 2018-11-06 PROCEDURE — 70450 CT HEAD/BRAIN W/O DYE: CPT

## 2018-11-06 PROCEDURE — 72070 X-RAY EXAM THORAC SPINE 2VWS: CPT

## 2018-11-06 PROCEDURE — 72128 CT CHEST SPINE W/O DYE: CPT

## 2018-11-06 RX ORDER — OXYCODONE HYDROCHLORIDE AND ACETAMINOPHEN 5; 325 MG/1; MG/1
1 TABLET ORAL EVERY 6 HOURS PRN
Qty: 20 TABLET | Refills: 0 | Status: SHIPPED | OUTPATIENT
Start: 2018-11-06 | End: 2018-11-10 | Stop reason: HOSPADM

## 2018-11-06 RX ORDER — LIDOCAINE 50 MG/G
1 PATCH TOPICAL ONCE
Status: DISCONTINUED | OUTPATIENT
Start: 2018-11-06 | End: 2018-11-06 | Stop reason: HOSPADM

## 2018-11-06 RX ORDER — DOCUSATE SODIUM 100 MG/1
100 CAPSULE, LIQUID FILLED ORAL 2 TIMES DAILY
Qty: 20 CAPSULE | Refills: 0 | Status: SHIPPED | OUTPATIENT
Start: 2018-11-06 | End: 2019-04-11 | Stop reason: ALTCHOICE

## 2018-11-06 RX ORDER — ACETAMINOPHEN 325 MG/1
650 TABLET ORAL ONCE
Status: COMPLETED | OUTPATIENT
Start: 2018-11-06 | End: 2018-11-06

## 2018-11-06 RX ORDER — LIDOCAINE 4 G/G
1 PATCH TOPICAL EVERY 12 HOURS PRN
Qty: 15 PATCH | Refills: 0 | Status: ON HOLD | OUTPATIENT
Start: 2018-11-06 | End: 2018-11-10

## 2018-11-06 RX ORDER — GLYCERIN/MALTODEXTRIN
30 LIQUID (ML) ORAL DAILY
COMMUNITY
End: 2019-01-16

## 2018-11-06 RX ORDER — NYSTATIN 100000 [USP'U]/G
1 POWDER TOPICAL 2 TIMES DAILY
COMMUNITY
End: 2019-01-16

## 2018-11-06 RX ADMIN — ACETAMINOPHEN 650 MG: 325 TABLET, FILM COATED ORAL at 09:05

## 2018-11-06 NOTE — ED NOTES
Report given to receiving JO Hernandez at [facility]    by [facility]  @ 17:00      Kurtis Rader RN  11/06/18 2183

## 2018-11-06 NOTE — ED PROVIDER NOTES
History  Chief Complaint   Patient presents with    Fall     Pt c/o of falling trying to go to bathroom  Pt states he fell backwards striking head  Pt denies LOC  Pt does take Coumadin  Pt states his left shoulder and back have pain  80-year-old male presents from [facility] for evaluation after falling  Patient states he was attempting to go to the bathroom when he lost his balance and fell backwards  Patient did strike his head but did not have a loss of consciousness  Patient's daughter is at the bedside to help provide history  The patient does have chronic left shoulder pain  His discomfort appears to be at his baseline  He does have new mid and lower back pain  The patient is minimally ambulatory and mostly is in a recliner or wheelchair  He does occasionally stand to transfer  Patient's daughter had been encouraging him to walk more but he has not been walking  The patient denies new numbness or weakness in the lower extremities  He is not having any difficulty with bowel or bladder habits  Patient has pain in his back only when he moves and feels comfortable resting and lying still  Patient has a history of a subdural hematoma and a complex T9 burst fracture that was managed by Neurosurgery in March of this year  At that time the patient had fallen and was taking Coumadin  Coumadin has been since discontinued due to his high risk of fall  Patient's daughter states that she would ultimately prefer the patient to be discharged back to the nursing facility if possible          History provided by:  Patient, medical records and relative   used: No    Fall   Mechanism of injury: fall    Injury location:  Torso  Torso injury location:  Back  Incident location:  Nursing home  Arrived directly from scene: yes    Fall:     Fall occurred:  Standing    Impact surface:  Hard floor    Point of impact:  Head and back  Suspicion of alcohol use: no    Suspicion of drug use: no Prior to arrival data:     Patient ambulatory at scene: no      Blood loss:  None    Responsiveness at scene:  Alert    Orientation at scene:  Person    Loss of consciousness: no      Amnesic to event: no    Associated symptoms: back pain and nausea    Associated symptoms: no difficulty breathing, no headaches, no loss of consciousness, no neck pain, no seizures and no vomiting    Risk factors: no anticoagulation therapy        Prior to Admission Medications   Prescriptions Last Dose Informant Patient Reported? Taking? Amino Acids (LIQUACEL) LIQD   Yes Yes   Sig: Take 30 mL by mouth daily   Cholecalciferol (VITAMIN D3) 2000 UNITS TABS  Outside Facility (Specify) Yes Yes   Sig: Take 2,000 Units by mouth daily  Schraggers Paste   Yes Yes   Sig: Apply 1 application topically 2 (two) times a day   Tamsulosin HCl (FLOMAX PO)  Outside Facility (Specify) Yes Yes   Sig: Take 0 4 mg by mouth daily     acetaminophen (TYLENOL) 325 mg tablet  Outside Facility (Specify) No Yes   Sig: Take 2 tablets by mouth every 6 (six) hours as needed for mild pain   Patient taking differently: Take 650 mg by mouth 3 (three) times a day     aspirin (ECOTRIN LOW STRENGTH) 81 mg EC tablet  Outside Facility (Specify) Yes Yes   Sig: Take 81 mg by mouth daily   benzonatate (TESSALON PERLES) 100 mg capsule  Outside Facility (Specify) Yes Yes   Sig: Take 100 mg by mouth every 6 (six) hours as needed for cough   cyanocobalamin (VITAMIN B-12) 1,000 mcg tablet  Outside Facility (Specify) Yes Yes   Sig: Take 1,000 mcg by mouth daily  finasteride (PROSCAR) 5 mg tablet  Outside Facility (Specify) Yes Yes   Sig: Take 5 mg by mouth daily     furosemide (LASIX) 20 mg tablet  Outside Facility (Specify) Yes Yes   Sig: Take 10 mg by mouth every morning     gabapentin (NEURONTIN) 100 mg capsule  Outside Facility (Specify) No Yes   Sig: Take 1 capsule by mouth daily at bedtime   loratadine (CLARITIN) 10 mg tablet  Outside Facility ([address]) Yes Yes   Sig: Take 10 mg by mouth daily as needed for allergies   nystatin (MYCOSTATIN) powder   Yes Yes   Sig: Apply 1 application topically 2 (two) times a day   nystatin (MYCOSTATIN) powder   Yes Yes   Sig: Apply 1 application topically 2 (two) times a day   sodium chloride (OCEAN) 0 65 % nasal spray  Outside Facility (Specify) Yes Yes   Si sprays into each nostril 3 (three) times a day  Facility-Administered Medications: None       Past Medical History:   Diagnosis Date    Actinic keratosis     Anemia     chronic hemolytic anemia    Atrial fibrillation (HCC)     Benign prostatic hypertrophy     Blood in urine     Cellulitis     Dermatitis     Disease of thyroid gland     Dyslipidemia     Epidural hematoma (HCC)     Epistaxis     Falling     Folate deficiency     GERD (gastroesophageal reflux disease)     Hip fracture (HCC)     Hyperlipidemia     Hypertension     Hypothyroidism (acquired)     Insomnia     Neuropathy     Nocturia     Osteoarthritis     Psychiatric disorder     depression    Renal disorder     Acute renal failure    Sepsis (HCC)     Shoulder pain, right     Squamous cell carcinoma     Subdural hematoma (HCC)     Thrombophlebitis     Urinary retention        Past Surgical History:   Procedure Laterality Date    HIP SURGERY         History reviewed  No pertinent family history  I have reviewed and agree with the history as documented  Social History   Substance Use Topics    Smoking status: Former Smoker     Quit date: 1976    Smokeless tobacco: Never Used    Alcohol use No      Comment: once a week        Review of Systems   Constitutional: Negative for chills and fever  Respiratory: Negative for shortness of breath  Cardiovascular: Negative for palpitations  Gastrointestinal: Positive for nausea  Negative for vomiting  Genitourinary: Negative for dysuria  Musculoskeletal: Positive for back pain and gait problem  Negative for neck pain     Skin: Positive for wound (Multiple healing skin tears on upper extremities)  Neurological: Negative for seizures, loss of consciousness and headaches  All other systems reviewed and are negative  Physical Exam  Physical Exam   Constitutional: He is oriented to person, place, and time  Vital signs are normal  He appears well-developed and well-nourished  No distress  Slightly hard of hearing   HENT:   Head: Normocephalic and atraumatic  No acute sign of trauma to the head or neck   Eyes: Pupils are equal, round, and reactive to light  Conjunctivae and EOM are normal    Neck: Normal range of motion  Neck supple  Cardiovascular: Normal rate, regular rhythm, normal heart sounds and intact distal pulses  Pulmonary/Chest: Effort normal and breath sounds normal  No accessory muscle usage  No respiratory distress  He exhibits no tenderness  Abdominal: Soft  Normal appearance and bowel sounds are normal  He exhibits no distension  There is no tenderness  There is no rebound and no guarding  Musculoskeletal: He exhibits no edema  Left shoulder: He exhibits decreased range of motion and tenderness  He exhibits no bony tenderness, no deformity, no spasm, normal pulse and normal strength  Right ankle: He exhibits deformity  Thoracic back: He exhibits decreased range of motion, tenderness, bony tenderness and swelling  He exhibits no deformity and no spasm  Back:         Arms:       Feet:    Lymphadenopathy:     He has no cervical adenopathy  Neurological: He is alert and oriented to person, place, and time  He has normal strength and normal reflexes  Coordination normal  GCS eye subscore is 4  GCS verbal subscore is 5  GCS motor subscore is 6  Patient is able to lift both legs equally to approximately 30°, strength and sensation intact  no hip tenderness  Gait not tested   Skin: Skin is warm, dry and intact  No rash noted  Psychiatric: He has a normal mood and affect   His behavior is normal  Judgment and thought content normal    Nursing note and vitals reviewed  Vital Signs  ED Triage Vitals   Temperature Pulse Respirations Blood Pressure SpO2   11/06/18 0755 11/06/18 0758 11/06/18 0758 11/06/18 0758 11/06/18 0758   97 7 °F (36 5 °C) (!) 54 18 (!) 184/88 99 %      Temp Source Heart Rate Source Patient Position - Orthostatic VS BP Location FiO2 (%)   11/06/18 0755 11/06/18 0758 11/06/18 0758 11/06/18 0758 --   Oral Monitor Lying Right arm       Pain Score       11/06/18 0758       2           Vitals:    11/06/18 1100 11/06/18 1215 11/06/18 1423 11/06/18 1530   BP: 161/78 (!) 179/89 169/90 143/90   Pulse: (!) 46 (!) 51 64 80   Patient Position - Orthostatic VS:   Lying Sitting       Visual Acuity  Visual Acuity      Most Recent Value   L Pupil Size (mm)  3   R Pupil Size (mm)  3          ED Medications  Medications   lidocaine (LIDODERM) 5 % patch 1 patch (0 patches Topical Hold 11/6/18 1601)   acetaminophen (TYLENOL) tablet 650 mg (650 mg Oral Given 11/6/18 0098)       Diagnostic Studies  Results Reviewed     None                 CT spine lumbar without contrast   Final Result by Francois Neil DO (11/06 1252)      1  Widening of the T12-L1 disc space consistent with traumatic distraction  Acute fracture of the L1 vertebral body superior endplate which appears superimposed on a chronic fracture deformity  Small amount of paravertebral hematoma at T12/L1  2  Nondisplaced fracture right T12 pedicle (series 602 image 44)  On sagittal imaging there also appears to be a fracture of the left posterior inferior T12 vertebral body corner with vertical extension along the junction of the vertebral body and    pedicle (series 602 images 60-61)  3  Chronic fracture deformity of T9  Cannot exclude superimposed acute superior endplate compression fracture along the left anterior endplate        4  Mild thoracolumbar dextroscoliosis with advanced multilevel degenerative changes and bridging marginal endplate osteophytes with multifocal fusion  5  Additional incidental findings as above including cholelithiasis, colonic diverticulosis, suspected right renal cyst and pulmonary vascular prominence  Workstation performed: [id number]         CT spine thoracic without contrast   Final Result by Ricardo Winn DO (11/06 1108)      1  Widening of the T12-L1 disc space consistent with traumatic distraction  Acute fracture of the L1 vertebral body superior endplate which appears superimposed on a chronic fracture deformity  Small amount of paravertebral hematoma at T12/L1  2  Nondisplaced fracture right T12 pedicle (series 602 image 44)  On sagittal imaging there also appears to be a fracture of the left posterior inferior T12 vertebral body corner with vertical extension along the junction of the vertebral body and    pedicle (series 602 images 60-61)  3  Chronic fracture deformity of T9  Cannot exclude superimposed acute superior endplate compression fracture along the left anterior endplate  4  Mild thoracolumbar dextroscoliosis with advanced multilevel degenerative changes and bridging marginal endplate osteophytes with multifocal fusion  5  Additional incidental findings as above including cholelithiasis, colonic diverticulosis, suspected right renal cyst and pulmonary vascular prominence  Workstation performed: [id number]         XR lumbar spine 2 or 3 views   Final Result by Ricardo Winn DO (11/06 3553)      Widening of the anterior disc space at T12-L1 suspicious for traumatic distraction, new from previous study  Cannot exclude traumatic deformity of the L1 superior endplate anteriorly  CT imaging recommended  Advanced multilevel lumbar spondylosis           Workstation performed: [id number]         XR thoracic spine 2 views   Final Result by Ricardo Winn DO (11/06 2793)      Question new superior endplate irregularity of T9 for which acute compression deformity cannot be excluded  This could represent interval endplate collapse from prior trauma given known T9 vertebral fracture on previous imaging  Correlation with CT    recommended  The study was marked in [facility] for immediate notification  Workstation performed: [id number]         CT head without contrast   Final Result by Elisa Capone MD (11/06 6467)      No acute intracranial abnormality  Workstation performed: [id number]         XR spine thoracolumbar 2 vw    (Results Pending)              Procedures  Procedures       Phone Contacts  ED Phone Contact    ED Course  ED Course as of Nov 06 1646   Tue Nov 06, 2018   1252 Med [facility] contacted and will fit patient for TLSO brace    1307 Case discussed with Cameron Antonio PA with neurosurgery - she will review the images with Dr Shae Dumont and call me back  [id number] Discussed case with Neurosurgery PA again, she is attempting to reach Dr Bang Farrell to review CT scan findings  Pt 's family updated  1550 Case discussed with neurosurgery Dr Bryson Negron and Cameron Antonio, they recommend a thoracolumbar upright AP and lateral film prior to discharge  The patient is to remain in the brace whenever he is upright/axial loading and he is to follow up in 2 weeks, they expect he will need to wear the brace for 2 months  Patient's daughter updated with plan of care  I will discharge the patient with pain medication  I did recommend the patient asked for pain meds before he plans to move around or transfer  Patient is very stoic  I will also prescribe the patient a topical lidocaine patch as well as Colace for stool softening if he needs to take the opiate pain medication                                  MDM  Number of Diagnoses or Management Options  Lumbar compression fracture [facility]): new and requires workup  Other fracture of unspecified thoracic vertebra, initial encounter for closed fracture [facility]): new and requires workup Amount and/or Complexity of Data Reviewed  Tests in the radiology section of CPT®: ordered and reviewed  Decide to obtain previous medical records or to obtain history from someone other than the patient: yes  Obtain history from someone other than the patient: yes  Discuss the patient with other providers: yes (Dr Rox Benavidez)  Independent visualization of images, tracings, or specimens: yes    Risk of Complications, Morbidity, and/or Mortality  General comments: 80-year-old male with acute on chronic thoracolumbar fractures, patient was placed in a TLSO brace in the department and pain is controlled  He will keep the brace in place at any time he is upright plan is that the patient will likely need to wear the brace for 2 months  Patient did wear similar brace with prior fracture  He will follow up with Neurosurgery in 2 weeks  I did prescribe the patient pain medication for as needed use at the nursing facility  Patient Progress  Patient progress: stable    CritCare Time    Disposition  Final diagnoses:   Lumbar compression fracture ([address] )   Other fracture of unspecified thoracic vertebra, initial encounter for closed fracture [facility])     Time reflects when diagnosis was documented in both MDM as applicable and the Disposition within this note     Time User Action Codes Description Comment    11/6/2018  1:30 PM Rhona Bright Add [S32 000A] Lumbar compression fracture ([address] )     11/6/2018  3:45 PM Rhona Bright Add [S22 008A] Other fracture of unspecified thoracic vertebra, initial encounter for closed fracture [facility])       ED Disposition     ED Disposition Condition Comment    Discharge  Kelsiemarceloalistair Dugan discharge to home/self care      Condition at discharge: Stable        Follow-up Information     Follow up With Specialties Details Why Contact Info    Indiana Brown MD Neurosurgery Schedule an appointment as soon as possible for a visit in 2 weeks for fracture evaluation and treatment [place] [address]  [phone]            Discharge Medication List as of 11/6/2018  3:49 PM      START taking these medications    Details   docusate sodium (COLACE) 100 mg capsule Take 1 capsule (100 mg total) by mouth 2 (two) times a day Prn constipation, Starting Tue 11/6/2018, Print      Lidocaine 4 % PTCH Apply 1 patch topically every 12 (twelve) hours as needed (pain), Starting Tue 11/6/2018, Print      oxyCODONE-acetaminophen (PERCOCET) 5-325 mg per tablet Take 1 tablet by mouth every 6 (six) hours as needed for moderate pain (May use up to two tablets every 6 hours ) for up to 10 days Max Daily Amount: 4 tablets, Starting Tue 11/6/2018, Until Fri 11/16/2018, Print         CONTINUE these medications which have NOT CHANGED    Details   acetaminophen (TYLENOL) 325 mg tablet Take 2 tablets by mouth every 6 (six) hours as needed for mild pain, Starting Sat 12/16/2017, Print      Amino Acids (LIQUACEL) LIQD Take 30 mL by mouth daily, Historical Med      aspirin (ECOTRIN LOW STRENGTH) 81 mg EC tablet Take 81 mg by mouth daily, Historical Med      benzonatate (TESSALON PERLES) 100 mg capsule Take 100 mg by mouth every 6 (six) hours as needed for cough, Historical Med      Cholecalciferol (VITAMIN D3) 2000 UNITS TABS Take 2,000 Units by mouth daily  , Historical Med      cyanocobalamin (VITAMIN B-12) 1,000 mcg tablet Take 1,000 mcg by mouth daily  , Historical Med      finasteride (PROSCAR) 5 mg tablet Take 5 mg by mouth daily  , Historical Med      furosemide (LASIX) 20 mg tablet Take 10 mg by mouth every morning  , Historical Med      gabapentin (NEURONTIN) 100 mg capsule Take 1 capsule by mouth daily at bedtime, Starting Sat 12/16/2017, Print      loratadine (CLARITIN) 10 mg tablet Take 10 mg by mouth daily as needed for allergies, Historical Med      !! nystatin (MYCOSTATIN) powder Apply 1 application topically 2 (two) times a day, Historical Med      !! nystatin (MYCOSTATIN) powder Apply 1 application topically 2 (two) times a day, Historical Med      Schraggers Paste Apply 1 application topically 2 (two) times a day, Historical Med      sodium chloride (OCEAN) 0 65 % nasal spray 2 sprays into each nostril 3 (three) times a day , Historical Med      Tamsulosin HCl (FLOMAX PO) Take 0 4 mg by mouth daily  , Historical Med       !! - Potential duplicate medications found  Please discuss with provider            Outpatient Discharge Orders  [facility]         ED Provider  Electronically Signed by           Mark Canseco DO  11/06/18 7025

## 2018-11-06 NOTE — ED NOTES
Report given to receiving facility  Discharge paperwork to be given to transport team  Patient awaiting         Hermila Alicea RN  11/06/18 4285

## 2018-11-06 NOTE — ED NOTES
Attempted to call for report at receiving facility, left voicemail  Will call back        Claudeen Russell, RN  11/06/18 5568

## 2018-11-06 NOTE — ED NOTES
Pt dressed  Awaiting arrival from [address] [place] [address] for TLSO fitting        Catalino Palacio RN  11/06/18 1225

## 2018-11-06 NOTE — TREATMENT PLAN
Treatment plan brief tele consult [facility] room [address] T  1/15/1929 one fifteen nineteen twenty nine    Call from  Dr Karan Walton re  Management newly noted T12- L1 anterior diastatic fracture vertebral bodies s/p fall    Apparently this 27-year-old man wheelchair-bound lives in [facility] and had a fall out of his  wheelchair  Apparently no LOC    Alert talking moving arms and legs nonfocal  Per primary nurse and  DRrWhite    Apparently Coumadin was stopped back in February when he had a small subdural    Imaging reviewed by me:  Head CT scan 2018 shows ventriculomegaly with increased extra-axial spaces and age-related moderately severe cerebral atrophy withatherosclerotic changes in the major vessels and low-density microvascular changes in the periventricular regions    No obvious intracranial hemorrhage     CT imaging of thoracolumbar spine showed open diastatic who fracture T12-11 in the setting of chronic L1 fracture known since 2018 on imaging and extensive advanced degenerative thoracolumbar spondylotic disease with collapse of disc spaces and auto fusion    There is advanced degenerative thoracolumbar disc disease    There is widening anteriorly of the T12-L1 disc space with traumatic distraction with evidence of hematoma anterior to the vertebral bodies on axial imaging    The L1 transverse superior end fracture is not new  It was present on CT imaging on 2017      A posterior superior triangular component of the fracture has migrated upwards and the posterior aspect of the disc space without any significant retropulsion into the spinal    There is age-related kyphosis    There is a nondisplaced fracture through the right pedicle extends into the rib articulation of T12 onto the lateral body of T12 on the the right    The left-sided pedicle of T12-L1 is intact as is the facet joint on that side    There is chronic fracture deformity of T9    There is grade 1 anterolisthesis of L4 and L5    I also reviewed his imaging later with Dr Lupe May is from neuro Radiology    Recommendation  In view of his age and dvanced degenerative disease conservative management TLSO brace is reasonable    Recommend upright x-rays thoracolumbar spine AP and lateral once TLSO brace is fitted    These follow-up x-ray can be carried out sitting    Recommend follow-up in NS office in two weeks time with PAC with upright x-rays of the thoracolumbar spine AP and lateral in the brace    Call [phone] for appointment    Presentation and management discussed with Dr Ana Cristina Ramos by telephone    11/6/2018 4:45 PM    Francesca Cordoba MD, Attending Neurological Surgeon

## 2018-11-06 NOTE — DISCHARGE INSTRUCTIONS
Vertebral Compression Fracture   WHAT YOU NEED TO KNOW:   A vertebral compression fracture (VCF) is a break in a part of the vertebra  Vertebrae are the round, strong bones that form your spine  VCFs most often occur in the thoracic (middle) and lumbar (lower) areas of your spine  Fractures may be mild to severe  DISCHARGE INSTRUCTIONS:   Medicines: You may need any of the following:  · NSAIDs , such as ibuprofen, help decrease swelling, pain, and fever  This medicine is available with or without a doctor's order  NSAIDs can cause stomach bleeding or kidney problems in certain people  If you take blood thinner medicine, always ask if NSAIDs are safe for you  Always read the medicine label and follow directions  Do not give these medicines to children under 10months of age without direction from your child's healthcare provider  · Acetaminophen  decreases pain and fever  It is available without a doctor's order  Ask how much to take and how often to take it  Follow directions  Acetaminophen can cause liver damage if not taken correctly  · Prescription pain medicine  may be given  Ask your healthcare provider how to take this medicine safely  · Bisphosphonates and calcitonin  may be recommended to help your bones get stronger  They can decrease the pain of a VCF caused by osteoporosis, and decrease your risk for another fracture  · Take your medicine as directed  Contact your healthcare provider if you think your medicine is not helping or if you have side effects  Tell him or her if you are allergic to any medicine  Keep a list of the medicines, vitamins, and herbs you take  Include the amounts, and when and why you take them  Bring the list or the pill bottles to follow-up visits  Carry your medicine list with you in case of an emergency  Follow up with your healthcare provider as directed: You may need to return for x-rays or other tests   Write down your questions so you remember to ask them during your visits  Heat and ice:   · Apply ice  on your back for 15 to 20 minutes every hour or as directed  Use an ice pack, or put crushed ice in a plastic bag  Cover it with a towel  Ice helps prevent tissue damage and decreases swelling and pain  · Apply heat  on your back for 20 to 30 minutes every 2 hours for as many days as directed  Heat helps decrease pain and muscle spasms  Activity:   · Avoid activities that may make the pain worse, such as picking up heavy objects  When the pain decreases, begin normal, slow movements as directed by your healthcare provider  Your healthcare provider may have you do weight-bearing exercises such as walking  You may also do non-weight-bearing exercises such as swimming and bicycling  · You may need to use a walker or cane  Ask your healthcare provider for more information about how to use a cane or a walker  · When you  objects, bend at the hips and knees  Never bend from the waist only  Use bent knees and your leg muscles as you lift the object  While you lift the object, keep it close to your chest  Try not to twist or lift anything above your waist   Physical and occupational therapy:  Your healthcare provider may recommend physical and occupational therapy  A physical therapist teaches you exercises to help improve movement and strength, and to decrease pain  An occupational therapist teaches you skills to help with your daily activities  Manage pain during sleep:   · Do not sleep on a waterbed  Waterbeds do not provide good back support  · Sleep on a firm mattress  You may also put a ½ to 1-inch piece of plywood between the mattress and box spring  · Sleep on your back with a pillow under your knees  This will decrease pressure on your back  You may also sleep on your side with 1 or both of your knees bent and a pillow between them  It may also be helpful to sleep on your stomach with a pillow under you at waist level    Contact your healthcare provider if:   · You are not hungry, and you are losing weight  · You cannot sleep or rest because of back pain  · You have pain or swelling in your back that is getting worse, or does not go away  · You have questions or concerns about your condition or care  Return to the emergency department if:   · You feel lightheaded, short of breath, and have chest pain  · You cough up blood  · Your arm or leg feels warm, tender, and painful  It may look swollen and red  · You have new problems urinating or having bowel movements  · You have severe pain in your back after falling, bending forward, sneezing, or coughing strongly  · You suddenly cannot feel your legs  · You suddenly have trouble moving your arms or legs  © 2017 [address]  Information is for End User's use only and may not be sold, redistributed or otherwise used for commercial purposes  All illustrations and images included in CareNotes® are the copyrighted property of [employer]  or [employer]cant[employer]  The above information is an  only  It is not intended as medical advice for individual conditions or treatments  Talk to your doctor, nurse or pharmacist before following any medical regimen to see if it is safe and effective for you  Thoracolumbosacral Orthosis   WHAT YOU NEED TO KNOW:   What is a thoracolumbosacral orthosis (TLSO)? A TLSO is a device used to support your spine and keep it from moving  A TLSO is made to fit from the middle of your chest to your tailbone  The TLSO provides support for your upper, middle, and lower spine at the same time  This is called a 3-point system  The 3 points are at your chest level, your ribs, and your pelvis  You may also need a piece that attaches to the top of the TLSO and fits under your chin  This prevents your head from moving   Examples of a TLSO include a clamshell brace and a thoracolumbar extension orthosis (often called a Erica brace)  Why may I need a TLSO? A TLSO may be needed to keep your spine stable after surgery  A TLSO may also be used instead of surgery to help correct spinal problems  An example is scoliosis (a curved spine) in adolescents  The TLSO will help your spine heal and protect it from injury  You may need to wear the TLSO for several months  Adolescents may need to wear the TLSO for a few years, until the end of puberty  How do I safely use a TLSO? · Get your TLSO fitted by your healthcare provider  It is very important that your orthosis is the right size for you and that it fits properly  Your healthcare provider will help you choose a TLSO that is right for your injury or condition  You may need to get a TLSO that is custom fit for your body  · Wear your orthosis as directed  You may need to wear your orthosis during certain activities or all the time  For example, you may need to wear it during any activity that could injure your back  Check the fit of the orthosis often  If it does not fit properly or moves out of place, it could cause more injury  · Care for your orthosis  Inspect your orthosis often  Do not wear your orthosis if it is damaged or broken  Ask your healthcare provider how to care for and clean your orthosis  · Start to strengthen your lower back as directed  You may need to work with a physical therapist to strengthen your lower back by doing exercises  Ask how much physical activity is safe for you  How do I care for my skin? · Always wear a clean, dry cotton shirt under your TLSO  The shirt will help absorb sweat and protect your skin  A small amount of powder may also help reduce the amount of moisture on the skin beneath the brace  · Check all areas of your skin beneath the brace every day  If you find red or irritated areas, check the position of your TLSO to make sure it is not too tight or too loose  If you have a rash, try changing your T-shirt more often  This can help if the rash is caused by heat, sweat, or laundry products  · Talk to your healthcare provider about showering  You may be able to take off the TLSO to shower  You also may be able to shower while you wear it  If you shower with the TLSO on, be sure to thoroughly dry the brace and the skin under it  When should I seek immediate care? · You have severe back pain  · You have numbness, tingling, or weakness in your legs  · You have problems urinating or having a bowel movement  When should I contact my healthcare provider? · Your back pain gets worse when you wear your brace  · Your skin is sore or raw after you wear your brace  · Your brace is damaged or broken  · You have questions or concerns about your condition or care  CARE AGREEMENT:   You have the right to help plan your care  Learn about your health condition and how it may be treated  Discuss treatment options with your caregivers to decide what care you want to receive  You always have the right to refuse treatment  The above information is an  only  It is not intended as medical advice for individual conditions or treatments  Talk to your doctor, nurse or pharmacist before following any medical regimen to see if it is safe and effective for you  © 2017 [address] [employer] Information is for End User's use only and may not be sold, redistributed or otherwise used for commercial purposes  All illustrations and images included in CareNotes® are the copyrighted property of A [employer] M [employer]  or [employer]

## 2018-11-07 ENCOUNTER — HOSPITAL ENCOUNTER (INPATIENT)
Facility: HOSPITAL | Age: 83
LOS: 3 days | Discharge: NURSING, REHAB OR LONG-TERM CARE | DRG: 552 | End: 2018-11-10
Attending: EMERGENCY MEDICINE | Admitting: INTERNAL MEDICINE
Payer: MEDICARE

## 2018-11-07 DIAGNOSIS — S32.019A L1 VERTEBRAL FRACTURE (HCC): ICD-10-CM

## 2018-11-07 DIAGNOSIS — S32.000A LUMBAR COMPRESSION FRACTURE (HCC): ICD-10-CM

## 2018-11-07 DIAGNOSIS — S32.019A CLOSED FRACTURE OF FIRST LUMBAR VERTEBRA, UNSPECIFIED FRACTURE MORPHOLOGY, INITIAL ENCOUNTER (HCC): ICD-10-CM

## 2018-11-07 DIAGNOSIS — D69.6 THROMBOCYTOPENIA (HCC): ICD-10-CM

## 2018-11-07 DIAGNOSIS — N18.9 CHRONIC RENAL INSUFFICIENCY: ICD-10-CM

## 2018-11-07 DIAGNOSIS — S22.089A CLOSED T12 FRACTURE (HCC): Primary | ICD-10-CM
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
## 2018-11-07 LAB
ANION GAP SERPL CALCULATED.3IONS-SCNC: 8 MMOL/L (ref 4–13)
BASOPHILS # BLD AUTO: 0.03 THOUSANDS/ΜL (ref 0–0.1)
BASOPHILS NFR BLD AUTO: 0 % (ref 0–1)
BUN SERPL-MCNC: 23 MG/DL (ref 5–25)
CALCIUM SERPL-MCNC: 8.7 MG/DL (ref 8.3–10.1)
CHLORIDE SERPL-SCNC: 106 MMOL/L (ref 100–108)
CO2 SERPL-SCNC: 28 MMOL/L (ref 21–32)
CREAT SERPL-MCNC: 1.39 MG/DL (ref 0.6–1.3)
EOSINOPHIL # BLD AUTO: 0.1 THOUSAND/ΜL (ref 0–0.61)
EOSINOPHIL NFR BLD AUTO: 1 % (ref 0–6)
ERYTHROCYTE [DISTWIDTH] IN BLOOD BY AUTOMATED COUNT: 14 % (ref 11.6–15.1)
GFR SERPL CREATININE-BSD FRML MDRD: 45 ML/MIN/1.73SQ M
GLUCOSE SERPL-MCNC: 97 MG/DL (ref 65–140)
HCT VFR BLD AUTO: 39.1 % (ref 36.5–49.3)
HGB BLD-MCNC: 13 G/DL (ref 12–17)
IMM GRANULOCYTES # BLD AUTO: 0.02 THOUSAND/UL (ref 0–0.2)
IMM GRANULOCYTES NFR BLD AUTO: 0 % (ref 0–2)
LYMPHOCYTES # BLD AUTO: 0.97 THOUSANDS/ΜL (ref 0.6–4.47)
LYMPHOCYTES NFR BLD AUTO: 13 % (ref 14–44)
MCH RBC QN AUTO: 30 PG (ref 26.8–34.3)
MCHC RBC AUTO-ENTMCNC: 33.2 G/DL (ref 31.4–37.4)
MCV RBC AUTO: 90 FL (ref 82–98)
MONOCYTES # BLD AUTO: 0.74 THOUSAND/ΜL (ref 0.17–1.22)
MONOCYTES NFR BLD AUTO: 10 % (ref 4–12)
NEUTROPHILS # BLD AUTO: 5.65 THOUSANDS/ΜL (ref 1.85–7.62)
NEUTS SEG NFR BLD AUTO: 76 % (ref 43–75)
NRBC BLD AUTO-RTO: 0 /100 WBCS
PLATELET # BLD AUTO: 106 THOUSANDS/UL (ref 149–390)
PMV BLD AUTO: 10.3 FL (ref 8.9–12.7)
POTASSIUM SERPL-SCNC: 4 MMOL/L (ref 3.5–5.3)
RBC # BLD AUTO: 4.33 MILLION/UL (ref 3.88–5.62)
SODIUM SERPL-SCNC: 142 MMOL/L (ref 136–145)
WBC # BLD AUTO: 7.51 THOUSAND/UL (ref 4.31–10.16)

## 2018-11-07 PROCEDURE — 85025 COMPLETE CBC W/AUTO DIFF WBC: CPT | Performed by: EMERGENCY MEDICINE

## 2018-11-07 PROCEDURE — 36415 COLL VENOUS BLD VENIPUNCTURE: CPT | Performed by: EMERGENCY MEDICINE

## 2018-11-07 PROCEDURE — 80048 BASIC METABOLIC PNL TOTAL CA: CPT | Performed by: EMERGENCY MEDICINE

## 2018-11-07 PROCEDURE — 87081 CULTURE SCREEN ONLY: CPT | Performed by: PHYSICIAN ASSISTANT

## 2018-11-07 PROCEDURE — 99223 1ST HOSP IP/OBS HIGH 75: CPT | Performed by: INTERNAL MEDICINE

## 2018-11-07 PROCEDURE — 99285 EMERGENCY DEPT VISIT HI MDM: CPT

## 2018-11-07 RX ORDER — HEPARIN SODIUM 5000 [USP'U]/ML
5000 INJECTION, SOLUTION INTRAVENOUS; SUBCUTANEOUS EVERY 8 HOURS SCHEDULED
Status: DISCONTINUED | OUTPATIENT
Start: 2018-11-07 | End: 2018-11-10 | Stop reason: HOSPADM

## 2018-11-07 RX ORDER — DOCUSATE SODIUM 100 MG/1
100 CAPSULE, LIQUID FILLED ORAL 2 TIMES DAILY
Status: DISCONTINUED | OUTPATIENT
Start: 2018-11-07 | End: 2018-11-10 | Stop reason: HOSPADM

## 2018-11-07 RX ORDER — NYSTATIN 100000 [USP'U]/G
1 POWDER TOPICAL 2 TIMES DAILY
Status: DISCONTINUED | OUTPATIENT
Start: 2018-11-07 | End: 2018-11-07

## 2018-11-07 RX ORDER — FINASTERIDE 5 MG/1
5 TABLET, FILM COATED ORAL DAILY
Status: DISCONTINUED | OUTPATIENT
Start: 2018-11-08 | End: 2018-11-10 | Stop reason: HOSPADM

## 2018-11-07 RX ORDER — ACETAMINOPHEN 325 MG/1
975 TABLET ORAL EVERY 8 HOURS SCHEDULED
Status: DISCONTINUED | OUTPATIENT
Start: 2018-11-07 | End: 2018-11-10 | Stop reason: HOSPADM

## 2018-11-07 RX ORDER — HYDRALAZINE HYDROCHLORIDE 20 MG/ML
10 INJECTION INTRAMUSCULAR; INTRAVENOUS EVERY 6 HOURS PRN
Status: DISCONTINUED | OUTPATIENT
Start: 2018-11-07 | End: 2018-11-10 | Stop reason: HOSPADM

## 2018-11-07 RX ORDER — MELATONIN
2000 DAILY
Status: DISCONTINUED | OUTPATIENT
Start: 2018-11-08 | End: 2018-11-10 | Stop reason: HOSPADM

## 2018-11-07 RX ORDER — OXYCODONE HYDROCHLORIDE 5 MG/1
2.5 TABLET ORAL EVERY 4 HOURS PRN
Status: DISCONTINUED | OUTPATIENT
Start: 2018-11-07 | End: 2018-11-10 | Stop reason: HOSPADM

## 2018-11-07 RX ORDER — TAMSULOSIN HYDROCHLORIDE 0.4 MG/1
0.4 CAPSULE ORAL DAILY
Status: DISCONTINUED | OUTPATIENT
Start: 2018-11-08 | End: 2018-11-10 | Stop reason: HOSPADM

## 2018-11-07 RX ORDER — BENZONATATE 100 MG/1
100 CAPSULE ORAL EVERY 6 HOURS PRN
Status: DISCONTINUED | OUTPATIENT
Start: 2018-11-07 | End: 2018-11-10 | Stop reason: HOSPADM

## 2018-11-07 RX ORDER — LIDOCAINE 50 MG/G
1 PATCH TOPICAL ONCE
Status: COMPLETED | OUTPATIENT
Start: 2018-11-07 | End: 2018-11-08

## 2018-11-07 RX ORDER — CHOLECALCIFEROL (VITAMIN D3) 125 MCG
1000 CAPSULE ORAL DAILY
Status: DISCONTINUED | OUTPATIENT
Start: 2018-11-08 | End: 2018-11-10 | Stop reason: HOSPADM

## 2018-11-07 RX ORDER — OXYCODONE HYDROCHLORIDE AND ACETAMINOPHEN 5; 325 MG/1; MG/1
1 TABLET ORAL ONCE
Status: COMPLETED | OUTPATIENT
Start: 2018-11-07 | End: 2018-11-07

## 2018-11-07 RX ORDER — HYDROMORPHONE HCL/PF 1 MG/ML
0.2 SYRINGE (ML) INJECTION EVERY 4 HOURS PRN
Status: DISCONTINUED | OUTPATIENT
Start: 2018-11-07 | End: 2018-11-10 | Stop reason: HOSPADM

## 2018-11-07 RX ORDER — ECHINACEA PURPUREA EXTRACT 125 MG
2 TABLET ORAL AS NEEDED
Status: DISCONTINUED | OUTPATIENT
Start: 2018-11-07 | End: 2018-11-10 | Stop reason: HOSPADM

## 2018-11-07 RX ORDER — GABAPENTIN 100 MG/1
100 CAPSULE ORAL
Status: DISCONTINUED | OUTPATIENT
Start: 2018-11-07 | End: 2018-11-10 | Stop reason: HOSPADM

## 2018-11-07 RX ORDER — OXYCODONE HYDROCHLORIDE 5 MG/1
5 TABLET ORAL EVERY 4 HOURS PRN
Status: DISCONTINUED | OUTPATIENT
Start: 2018-11-07 | End: 2018-11-10 | Stop reason: HOSPADM

## 2018-11-07 RX ORDER — ASPIRIN 81 MG/1
81 TABLET ORAL DAILY
Status: DISCONTINUED | OUTPATIENT
Start: 2018-11-08 | End: 2018-11-10 | Stop reason: HOSPADM

## 2018-11-07 RX ORDER — FUROSEMIDE 20 MG/1
10 TABLET ORAL EVERY MORNING
Status: DISCONTINUED | OUTPATIENT
Start: 2018-11-08 | End: 2018-11-07

## 2018-11-07 RX ORDER — NYSTATIN 100000 [USP'U]/G
1 POWDER TOPICAL 2 TIMES DAILY
Status: DISCONTINUED | OUTPATIENT
Start: 2018-11-07 | End: 2018-11-10 | Stop reason: HOSPADM

## 2018-11-07 RX ORDER — LORATADINE 10 MG/1
10 TABLET ORAL DAILY PRN
Status: DISCONTINUED | OUTPATIENT
Start: 2018-11-07 | End: 2018-11-10 | Stop reason: HOSPADM

## 2018-11-07 RX ADMIN — NYSTATIN 1 APPLICATION: 100000 POWDER TOPICAL at 19:27

## 2018-11-07 RX ADMIN — DOCUSATE SODIUM 100 MG: 100 CAPSULE, LIQUID FILLED ORAL at 19:28

## 2018-11-07 RX ADMIN — HEPARIN SODIUM 5000 UNITS: 5000 INJECTION, SOLUTION INTRAVENOUS; SUBCUTANEOUS at 21:24

## 2018-11-07 RX ADMIN — LIDOCAINE 1 PATCH: 50 PATCH CUTANEOUS at 16:13

## 2018-11-07 RX ADMIN — GABAPENTIN 100 MG: 100 CAPSULE ORAL at 21:24

## 2018-11-07 RX ADMIN — OXYCODONE AND ACETAMINOPHEN 1 TABLET: 5; 325 TABLET ORAL at 16:13

## 2018-11-07 RX ADMIN — ACETAMINOPHEN 975 MG: 325 TABLET, FILM COATED ORAL at 21:24

## 2018-11-07 RX ADMIN — Medication 1 APPLICATION: at 19:27

## 2018-11-07 NOTE — SOCIAL WORK
LOS 0 DAYS  PATIENT IS NOT A BUNDLE  PATIENT IS NOT A READMISSION  CM met with patient and daughter Isabelle at bedside, patient alert and oriented  Patient was seen in the ED yesterday and was fitted with a TLSO brace after being found with T12 and L1 fractures following a fall  Patient returns today with uncontrolled pain and inability to ambulate/transfer  Patient is a resident of [address] [place] [address]  Patient utilizes a wheelchair to ambulate and requires assistance with transfers  Patient reports receiving minimal assistance with ADLs  Patient is current with [facility] for wound care and has been to [facility] for rehab in the past  Patient's daughter stated that patient was not receiving the appropriate pain medication at [facility] and that he was unable to move this morning, requiring more care than they are able to provide at the assisted living portion  Patient's family normally provides transport  CM called and spoke with Veda Ley, assistant director of nursing at [facility] at [address]  Veda Ley stated that at this time, patient is performing below baseline and requires more assistance than they can provide  Veda Ley stated she would meet with her director and would get back to   CM continued discussion with patient and daughter at bedside when patient's daughter received call back from Katty collazo at [facility] admissions  Raymundoildya stated that at this time, patient is unsafe to return to his prior level of care as he requires pain control and more independent function and mobility prior to return to apartment  CM discussed with ED provider  At this time, patient will be admitted  CM Department to continue to assess for needs and will follow through discharge      CM reviewed discharge planning process including the following: identifying caregivers at home, preference for d/c planning needs, availability of treatment team to discuss questions or concerns patient and/or family may have regarding diagnosis, plan of care, old or new medications and discharge planning   CM will continue to follow for care coordination and update assessment as necessary

## 2018-11-07 NOTE — PLAN OF CARE
Problem: DISCHARGE PLANNING - CARE MANAGEMENT  Goal: Discharge to post-acute care or home with appropriate resources  INTERVENTIONS:  - Conduct assessment to determine patient/family and health care team treatment goals, and need for post-acute services based on payer coverage, community resources, and patient preferences, and barriers to discharge  - Address psychosocial, clinical, and financial barriers to discharge as identified in assessment in conjunction with the patient/family and health care team  - Arrange appropriate level of post-acute services according to patients   needs and preference and payer coverage in collaboration with the physician and health care team  - Communicate with and update the patient/family, physician, and health care team regarding progress on the discharge plan  - Arrange appropriate transportation to post-acute venues  Outcome: Progressing  LOS 0 DAYS  PATIENT IS NOT A BUNDLE  PATIENT IS NOT A READMISSION  CM met with patient and daughter Fatoumata Angry at bedside, patient alert and oriented  Patient was seen in the ED yesterday and was fitted with a TLSO brace after being found with T12 and L1 fractures following a fall  Patient returns today with uncontrolled pain and inability to ambulate/transfer  Patient is a resident of [address] [place] [address]  Patient utilizes a wheelchair to ambulate and requires assistance with transfers  Patient reports receiving minimal assistance with ADLs  Patient is current with [facility] for [facility] care and has been to [facility] for rehab in the past  Patient's daughter stated that patient was not receiving the appropriate pain medication at [facility] and that he was unable to move this morning, requiring more care than they are able to provide at the assisted living portion  Patient's family normally provides transport       CM called and spoke with Lavon Hood, assistant director of nursing at [facility] at [phone] ext [phone]  Kashmir Shaw stated that at this time, patient is performing below baseline and requires more assistance than they can provide  Kashmir Shaw stated she would meet with her director and would get back to CM  CM continued discussion with patient and daughter at bedside when patient's daughter received call back from Katty collazo at [facility] admissions  Alyce stated that at this time, patient is unsafe to return to his prior level of care as he requires pain control and more independent function and mobility prior to return to apartment  CM discussed with ED provider  At this time, patient will be admitted  CM Department to continue to assess for needs and will follow through discharge  CM reviewed discharge planning process including the following: identifying caregivers at home, preference for d/c planning needs, availability of treatment team to discuss questions or concerns patient and/or family may have regarding diagnosis, plan of care, old or new medications and discharge planning   CM will continue to follow for care coordination and update assessment as necessary

## 2018-11-07 NOTE — ASSESSMENT & PLAN NOTE
· Noted yesterday after sustaining fall at [facility]  Was in this ED for same  D/C'ed back to [facility] with pain control, stool softeners, and TLSO brace  Increased pain today  · CT results: 1  Widening of the T12-L1 disc space consistent with traumatic distraction  Acute fracture of the L1 vertebral body superior endplate which appears superimposed on a chronic fracture deformity  Small amount of paravertebral hematoma at T12/L1  2  Nondisplaced fracture right T12 pedicle (series 602 image 44)  On sagittal imaging there also appears to be a fracture of the left posterior inferior T12 vertebral body corner with vertical extension along the junction of the vertebral body and pedicle (series 602 images 60-61)     · Pain control: Oxy IR 2 5mg Q6 PRN moderate pain; oxy IR 5 mg q6 PRN severe pain; dilaudid 0 2mg IV Q4 PRN breakthrough; scheduled tylenol; continue home gabapentin dose of 100mg PO QHS  · PT/OT eval  · CM following

## 2018-11-07 NOTE — H&P
H&P- Joseph Dugan 1/15/1929, 80 y o  male MRN: [id number]    Unit/Bed#: ED 23 Encounter: [id number]    Primary Care Provider: Emiliana Junior   Date and time admitted to hospital: 11/7/2018 11:58 AM    * Closed L1 vertebral fracture ([address] )   Assessment & Plan    · Noted yesterday after sustaining fall at [facility]  Was in this ED for same  D/C'ed back to [facility] assisted living with pain control, stool softeners, and TLSO brace  Increased pain today  · CT results: 1  Widening of the T12-L1 disc space consistent with traumatic distraction  Acute fracture of the L1 vertebral body superior endplate which appears superimposed on a chronic fracture deformity  Small amount of paravertebral hematoma at T12/L1  2  Nondisplaced fracture right T12 pedicle (series 602 image 44)  On sagittal imaging there also appears to be a fracture of the left posterior inferior T12 vertebral body corner with vertical extension along the junction of the vertebral body and pedicle (series 602 images 60-61)  · Pain control: Oxy IR 2 5mg Q6 PRN moderate pain; oxy IR 5 mg q6 PRN severe pain; dilaudid 0 2mg IV Q4 PRN breakthrough; scheduled tylenol; continue home gabapentin dose of 100mg PO QHS  · PT/OT eval  · CM following      Closed T12 fracture (HCC)   Assessment & Plan    · Noted yesterday after sustaining fall at [facility]  Was in this ED for same  D/C'ed back to [facility] assisted living with pain control, stool softeners, and TLSO brace  Increased pain today  · CT results: 1  Widening of the T12-L1 disc space consistent with traumatic distraction  Acute fracture of the L1 vertebral body superior endplate which appears superimposed on a chronic fracture deformity  Small amount of paravertebral hematoma at T12/L1  2  Nondisplaced fracture right T12 pedicle (series 602 image 44)    On sagittal imaging there also appears to be a fracture of the left posterior inferior T12 vertebral body corner with vertical extension along the junction of the vertebral body and pedicle (series 602 images 60-61)  · Pain control: Oxy IR 2 5mg Q6 PRN moderate pain; oxy IR 5 mg q6 PRN severe pain; dilaudid 0 2mg IV Q4 PRN breakthrough; scheduled tylenol; continue home gabapentin dose of 100mg PO QHS  · PT/OT eval  · CM following     Ambulatory dysfunction   Assessment & Plan    · Reported increased pain and difficultly with transferring at [facility]  Most likely related to recent fall with fx  · At baseline, patient typically requires assistance for transfer from recliner to wheelchair, then self-propels  Usually 1 time per day he ambulates from his room to the dining chavez with a walker and assistance of aides  · PT/OT eval  · CM following  · Anticipate STR prior to return to previous level of care      Essential hypertension   Assessment & Plan    · BP elevated, suspect related to pain  · Continue pain control  · Continue home BP medications  · Hydralazine 10mg IV Q6 PRN SBP >180     Atrial fibrillation (HCC)   Assessment & Plan    · Rate controlled a fib today  · Not on anticoagulation 2/2 fall risk      Other hyperlipidemia   Assessment & Plan    · Patient is not currently on a statin         VTE Prophylaxis: Heparin  / sequential compression device   Code Status: Level 1-- Full Code  POLST: There is no POLST form on file for this patient (pre-hospital)  Discussion with family: daughter Harika Dwyer at bedside     Anticipated Length of Stay:  Patient will be admitted on an Inpatient basis with an anticipated length of stay of  > 2 midnights  Justification for Hospital Stay: pain control, anticipate PT/OT eval will reveal need for STR    Total Time for Visit, including Counseling / Coordination of Care: 30 minutes  Greater than 50% of this total time spent on direct patient counseling and coordination of care      Chief Complaint:   Intractable back pain     History of Present Illness:    José Luis Hansen is a 80 y o  male past medical history significant for atrial fibrillation, hypertension, hyperlipidemia since the ED for evaluation of intractable back pain after sustaining a fall yesterday  Per ED records yesterday, "Patient states he was attempting to go to the bathroom when he lost his balance and fell backwards  Patient did strike his head but did not have a loss of consciousness " Patient was seen in the ED yesterday and was diagnosed with a T12 and L1 fracture  Neurosurgery was consulted and patient was placed in a TLSO brace and recommended outpatient neurosurgery follow-up  According to staff at [facility], patient is having intractable back pain was having difficulty transitioning from wheelchair to recliner  They felt so they could not care for the patient with his current level of pain and transfer him to the ED for further evaluation  Patient does report increased pain and is having some difficulty transferring  He states his pain is worse when he does lie flat  No issues with bowel or bladder incontinence  Denies any change in sensation in his lower extremities  Has chronic right drop foot  No chest pain or shortness of breath      Review of Systems:    Review of Systems    Past Medical and Surgical History:     Past Medical History:   Diagnosis Date    Actinic keratosis     Anemia     chronic hemolytic anemia    Atrial fibrillation (HCC)     Benign prostatic hypertrophy     Blood in urine     Cellulitis     Dermatitis     Disease of thyroid gland     Dyslipidemia     Epidural hematoma (HCC)     Epistaxis     Falling     Folate deficiency     GERD (gastroesophageal reflux disease)     Hip fracture (HCC)     Hyperlipidemia     Hypertension     Hypothyroidism (acquired)     Insomnia     Neuropathy     Nocturia     Osteoarthritis     Psychiatric disorder     depression    Renal disorder     Acute renal failure    Sepsis (HCC)     Shoulder pain, right     Squamous cell carcinoma     Subdural hematoma (HCC)     Thrombophlebitis     Urinary retention        Past Surgical History:   Procedure Laterality Date    HIP SURGERY         Meds/Allergies:    Prior to Admission medications    Medication Sig Start Date End Date Taking? Authorizing Provider   acetaminophen (TYLENOL) 325 mg tablet Take 2 tablets by mouth every 6 (six) hours as needed for mild pain  Patient taking differently: Take 650 mg by mouth 3 (three) times a day   12/16/17   Negrita Ramesh PA-C   Amino Acids (LIQUACEL) LIQD Take 30 mL by mouth daily    Historical Provider, MD   aspirin (ECOTRIN LOW STRENGTH) 81 mg EC tablet Take 81 mg by mouth daily    Historical Provider, MD   benzonatate (TESSALON PERLES) 100 mg capsule Take 100 mg by mouth every 6 (six) hours as needed for cough    Historical Provider, MD   Cholecalciferol (VITAMIN D3) 2000 UNITS TABS Take 2,000 Units by mouth daily  Historical Provider, MD   cyanocobalamin (VITAMIN B-12) 1,000 mcg tablet Take 1,000 mcg by mouth daily  Historical Provider, MD   docusate sodium (COLACE) 100 mg capsule Take 1 capsule (100 mg total) by mouth 2 (two) times a day Prn constipation 11/6/18   Rhona Bright DO   finasteride (PROSCAR) 5 mg tablet Take 5 mg by mouth daily      Historical Provider, MD   furosemide (LASIX) 20 mg tablet Take 10 mg by mouth every morning      Historical Provider, MD   gabapentin (NEURONTIN) 100 mg capsule Take 1 capsule by mouth daily at bedtime 12/16/17   Negrita Ramesh PA-C   Lidocaine 4 % PTCH Apply 1 patch topically every 12 (twelve) hours as needed (pain) 11/6/18   Rhona Bright DO   loratadine (CLARITIN) 10 mg tablet Take 10 mg by mouth daily as needed for allergies    Historical Provider, MD   nystatin (MYCOSTATIN) powder Apply 1 application topically 2 (two) times a day    Historical Provider, MD   nystatin (MYCOSTATIN) powder Apply 1 application topically 2 (two) times a day    Historical Provider, MD   oxyCODONE-acetaminophen (PERCOCET) 5-325 mg per tablet Take 1 tablet by mouth every 6 (six) hours as needed for moderate pain (May use up to two tablets every 6 hours ) for up to 10 days Max Daily Amount: 4 tablets 11/6/18 11/16/18  DO Deonte Cisneros Paste Apply 1 application topically 2 (two) times a day    Historical Provider, MD   sodium chloride (OCEAN) 0 65 % nasal spray 2 sprays into each nostril 3 (three) times a day  Historical Provider, MD   Tamsulosin HCl (FLOMAX PO) Take 0 4 mg by mouth daily      Historical Provider, MD     I have reviewed home medications with patient personally  Allergies: No Known Allergies    Social History:     Marital Status:    Occupation: retired  Patient Pre-hospital Living Situation: [facility]-- Assisted Living  Patient Pre-hospital Level of Mobility: limited-- mostly wheelchair, occasionally walker  Patient Pre-hospital Diet Restrictions: none  Substance Use History:   History   Alcohol Use No     Comment: once a week     History   Smoking Status    Former Smoker    Quit date: 2/4/1976   Smokeless Tobacco    Former User     History   Drug Use No       Family History:    non-contributory    Physical Exam:     Vitals:   Blood Pressure: (!) 186/99 (11/07/18 1600)  Pulse: 58 (11/07/18 1600)  Temperature: 98 6 °F (37 °C) (11/07/18 1210)  Temp Source: Oral (11/07/18 1210)  Respirations: 18 (11/07/18 1544)  Weight - Scale: 88 3 kg (194 lb 10 7 oz) (11/07/18 1210)  SpO2: 96 % (11/07/18 1600)    Physical Exam   Constitutional: He is oriented to person, place, and time  He appears well-developed and well-nourished  No distress  HENT:   Head: Normocephalic and atraumatic  Mouth/Throat: No oropharyngeal exudate  Eyes: Pupils are equal, round, and reactive to light  Conjunctivae and EOM are normal  No scleral icterus  Neck: No JVD present  Cardiovascular: Exam reveals no gallop and no friction rub  Murmur heard  Irregularly irregular   Pulmonary/Chest: Effort normal and breath sounds normal  No respiratory distress   He has no wheezes  He has no rales  Abdominal: Soft  Bowel sounds are normal  He exhibits no distension  There is no tenderness  There is no rebound  Musculoskeletal: He exhibits edema (trace b/l LE), tenderness (low back, midline) and deformity (R drop foot; calcified R ankle)  Neurological: He is alert and oriented to person, place, and time  He displays normal reflexes  No cranial nerve deficit  He exhibits normal muscle tone  Skin: Skin is warm and dry  No rash noted  He is not diaphoretic  No erythema  Psychiatric: He has a normal mood and affect  His behavior is normal        Additional Data:     Lab Results: I have personally reviewed pertinent reports  Imaging: I have personally reviewed pertinent reports  No orders to display       EKG, Pathology, and Other Studies Reviewed on Admission:   · EKG: unavailable    Allscripts / Epic Records Reviewed: Yes     ** Please Note: This note has been constructed using a voice recognition system   **

## 2018-11-07 NOTE — ASSESSMENT & PLAN NOTE
· BP elevated, suspect related to pain  · Continue pain control  · Continue home BP medications  · Hydralazine 10mg IV Q6 PRN SBP >180

## 2018-11-07 NOTE — ED PROVIDER NOTES
History  Chief Complaint   Patient presents with    Gait Problem     from [facility] via EMS post fall on Monday  PT has L1 FX, unable to currently ambulate/reevaluate     80year-old male returns to the emergency department by EMS for re-evaluation  I evaluated the patient yesterday after he fell at [facility]  Patient was found to have T12 and L1 fractures and was placed in a TLSO brace  He was discharged back to [facility] after consultation with Neurosurgery  The patient's pain was well controlled  The patient is nonambulatory and transfers from recliner to wheelchair for meals  History provided by:  Patient and medical records   used: No    Medical Problem   Location:  Back pain  Severity:  Severe  Onset quality:  Sudden  Duration:  1 day  Timing:  Intermittent  Progression:  Unchanged  Chronicity:  New  Context:  Patient fell yesterday and was seen in the department, fitted for a TLSO brace  Upon return to the nursing home the patient was having difficulty transferring and they do not have the appropriate care to offer the patient with his increased needs  Associated symptoms: no abdominal pain, no chest pain, no fever, no nausea and no vomiting        Prior to Admission Medications   Prescriptions Last Dose Informant Patient Reported? Taking? Amino Acids (LIQUACEL) LIQD   Yes No   Sig: Take 30 mL by mouth daily   Cholecalciferol (VITAMIN D3) 2000 UNITS TABS  Outside Facility (Specify) Yes No   Sig: Take 2,000 Units by mouth daily     Lidocaine 4 % PTCH   No No   Sig: Apply 1 patch topically every 12 (twelve) hours as needed (pain)   Schraggers Paste   Yes No   Sig: Apply 1 application topically 2 (two) times a day   Tamsulosin HCl (FLOMAX PO)  Outside Facility (Specify) Yes No   Sig: Take 0 4 mg by mouth daily     acetaminophen (TYLENOL) 325 mg tablet  Outside Facility (Specify) No No   Sig: Take 2 tablets by mouth every 6 (six) hours as needed for mild pain   Patient taking differently: Take 650 mg by mouth 3 (three) times a day     aspirin (ECOTRIN LOW STRENGTH) 81 mg EC tablet  Outside Facility (Specify) Yes No   Sig: Take 81 mg by mouth daily   benzonatate (TESSALON PERLES) 100 mg capsule  Outside Facility (Specify) Yes No   Sig: Take 100 mg by mouth every 6 (six) hours as needed for cough   cyanocobalamin (VITAMIN B-12) 1,000 mcg tablet  Outside Facility (Specify) Yes No   Sig: Take 1,000 mcg by mouth daily  docusate sodium (COLACE) 100 mg capsule   No No   Sig: Take 1 capsule (100 mg total) by mouth 2 (two) times a day Prn constipation   finasteride (PROSCAR) 5 mg tablet  Outside Facility (Specify) Yes No   Sig: Take 5 mg by mouth daily  furosemide (LASIX) 20 mg tablet  Outside Facility (Specify) Yes No   Sig: Take 10 mg by mouth every morning     gabapentin (NEURONTIN) 100 mg capsule  Outside Facility (Specify) No No   Sig: Take 1 capsule by mouth daily at bedtime   loratadine (CLARITIN) 10 mg tablet  Outside Facility (Specify) Yes No   Sig: Take 10 mg by mouth daily as needed for allergies   nystatin (MYCOSTATIN) powder   Yes No   Sig: Apply 1 application topically 2 (two) times a day   nystatin (MYCOSTATIN) powder   Yes No   Sig: Apply 1 application topically 2 (two) times a day   oxyCODONE-acetaminophen (PERCOCET) 5-325 mg per tablet   No No   Sig: Take 1 tablet by mouth every 6 (six) hours as needed for moderate pain (May use up to two tablets every 6 hours ) for up to 10 days Max Daily Amount: 4 tablets   sodium chloride (OCEAN) 0 65 % nasal spray  Outside Facility (Specify) Yes No   Si sprays into each nostril 3 (three) times a day        Facility-Administered Medications: None       Past Medical History:   Diagnosis Date    Actinic keratosis     Anemia     chronic hemolytic anemia    Atrial fibrillation (HCC)     Benign prostatic hypertrophy     Blood in urine     Cellulitis     Dermatitis     Disease of thyroid gland     Dyslipidemia  Epidural hematoma (HCC)     Epistaxis     Falling     Folate deficiency     GERD (gastroesophageal reflux disease)     Hip fracture (HCC)     Hyperlipidemia     Hypertension     Hypothyroidism (acquired)     Insomnia     Neuropathy     Nocturia     Osteoarthritis     Psychiatric disorder     depression    Renal disorder     Acute renal failure    Sepsis (HCC)     Shoulder pain, right     Squamous cell carcinoma     Subdural hematoma (HCC)     Thrombophlebitis     Urinary retention        Past Surgical History:   Procedure Laterality Date    HIP SURGERY         History reviewed  No pertinent family history  I have reviewed and agree with the history as documented  Social History   Substance Use Topics    Smoking status: Former Smoker     Quit date: 2/4/1976    Smokeless tobacco: Former User    Alcohol use No      Comment: once a week        Review of Systems   Constitutional: Negative for fever  Cardiovascular: Negative for chest pain  Gastrointestinal: Negative for abdominal pain, nausea and vomiting  Genitourinary: Negative for dysuria  Musculoskeletal: Positive for back pain and gait problem  Skin: Positive for wound  Neurological: Negative for weakness  All other systems reviewed and are negative  Physical Exam  Physical Exam   Constitutional: He is oriented to person, place, and time  Vital signs are normal  He appears well-developed and well-nourished  Elderly appearing   HENT:   Head: Normocephalic and atraumatic  Eyes: Pupils are equal, round, and reactive to light  Conjunctivae and EOM are normal    Neck: Normal range of motion  Neck supple  Cardiovascular: Normal rate, regular rhythm, normal heart sounds and intact distal pulses  Pulmonary/Chest: Effort normal and breath sounds normal  No accessory muscle usage  No respiratory distress  He exhibits no tenderness  Abdominal: Soft   Normal appearance and bowel sounds are normal  He exhibits no distension  There is no tenderness  There is no rebound and no guarding  Musculoskeletal: Normal range of motion  He exhibits no edema, tenderness or deformity  Lymphadenopathy:     He has no cervical adenopathy  Neurological: He is alert and oriented to person, place, and time  He has normal strength and normal reflexes  Coordination normal    Skin: Skin is warm, dry and intact  No rash noted  Several scabbed lesions on the left forearm   Psychiatric: He has a normal mood and affect  His behavior is normal  Judgment and thought content normal    Vitals reviewed        Vital Signs  ED Triage Vitals   Temperature Pulse Respirations Blood Pressure SpO2   11/07/18 1202 11/07/18 1202 11/07/18 1202 11/07/18 1202 11/07/18 1202   99 3 °F (37 4 °C) 67 16 159/78 93 %      Temp Source Heart Rate Source Patient Position - Orthostatic VS BP Location FiO2 (%)   11/07/18 1202 11/07/18 1202 11/07/18 1202 11/07/18 1202 --   Oral Monitor Sitting Right arm       Pain Score       11/07/18 1210       No Pain           Vitals:    11/07/18 1544 11/07/18 1600 11/07/18 1713 11/07/18 1804   BP: (!) 188/96 (!) 186/99 (!) 172/98 162/90   Pulse: 75 58 65    Patient Position - Orthostatic VS: Lying  Lying Lying       Visual Acuity      ED Medications  Medications   lidocaine (LIDODERM) 5 % patch 1 patch (1 patch Topical Medication Applied 11/7/18 1613)   morphine injection 2 mg (not administered)   aspirin (ECOTRIN LOW STRENGTH) EC tablet 81 mg (not administered)   benzonatate (TESSALON PERLES) capsule 100 mg (not administered)   cholecalciferol (VITAMIN D3) tablet 2,000 Units (not administered)   cyanocobalamin (VITAMIN B-12) tablet 1,000 mcg (not administered)   docusate sodium (COLACE) capsule 100 mg (not administered)   finasteride (PROSCAR) tablet 5 mg (not administered)   gabapentin (NEURONTIN) capsule 100 mg (not administered)   loratadine (CLARITIN) tablet 10 mg (not administered)   nystatin (MYCOSTATIN) powder 1 application (not administered)   Schraggers Paste 1 application (not administered)   sodium chloride (OCEAN) 0 65 % nasal spray 2 spray (not administered)   tamsulosin (FLOMAX) capsule 0 4 mg (not administered)   heparin (porcine) subcutaneous injection 5,000 Units (5,000 Units Subcutaneous Not Given 11/7/18 1843)   acetaminophen (TYLENOL) tablet 975 mg (975 mg Oral Not Given 11/7/18 1843)   oxyCODONE (ROXICODONE) IR tablet 2 5 mg (not administered)   oxyCODONE (ROXICODONE) IR tablet 5 mg (not administered)   HYDROmorphone (DILAUDID) injection 0 2 mg (not administered)   hydrALAZINE (APRESOLINE) injection 10 mg (not administered)   oxyCODONE-acetaminophen (PERCOCET) 5-325 mg per tablet 1 tablet (1 tablet Oral Given 11/7/18 1613)       Diagnostic Studies  Results Reviewed     Procedure Component Value Units Date/Time    CBC and differential [[id number]]  (Abnormal) Collected:  11/07/18 1613    Lab Status:  Final result Specimen:  Blood from Arm, Right Updated:  11/07/18 1708     WBC 7 51 Thousand/uL      RBC 4 33 Million/uL      Hemoglobin 13 0 g/dL      Hematocrit 39 1 %      MCV 90 fL      MCH 30 0 pg      MCHC 33 2 g/dL      RDW 14 0 %      MPV 10 3 fL      Platelets 491 (L) Thousands/uL      nRBC 0 /100 WBCs      Neutrophils Relative 76 (H) %      Immat GRANS % 0 %      Lymphocytes Relative 13 (L) %      Monocytes Relative 10 %      Eosinophils Relative 1 %      Basophils Relative 0 %      Neutrophils Absolute 5 65 Thousands/µL      Immature Grans Absolute 0 02 Thousand/uL      Lymphocytes Absolute 0 97 Thousands/µL      Monocytes Absolute 0 74 Thousand/µL      Eosinophils Absolute 0 10 Thousand/µL      Basophils Absolute 0 03 Thousands/µL     Basic metabolic panel [[id number]]  (Abnormal) Collected:  11/07/18 1613    Lab Status:  Final result Specimen:  Blood from Arm, Right Updated:  11/07/18 1645     Sodium 142 mmol/L      Potassium 4 0 mmol/L      Chloride 106 mmol/L      CO2 28 mmol/L      ANION GAP 8 mmol/L      BUN 23 mg/dL Creatinine 1 39 (H) mg/dL      Glucose 97 mg/dL      Calcium 8 7 mg/dL      eGFR 45 ml/min/1 73sq m     Narrative:         National Kidney Disease Education Program recommendations are as follows:  GFR calculation is accurate only with a steady state creatinine  Chronic Kidney disease less than 60 ml/min/1 73 sq  meters  Kidney failure less than 15 ml/min/1 73 sq  meters  No orders to display              Procedures  Procedures       Phone Contacts  ED Phone Contact    ED Course                               MDM  Number of Diagnoses or Management Options  Chronic renal insufficiency: new and requires workup  Closed T12 fracture [facility]): new and requires workup  Fall: new and requires workup  L1 vertebral fracture [facility]): new and requires workup  Thrombocytopenia [facility]): new and requires workup     Amount and/or Complexity of Data Reviewed  Clinical lab tests: ordered and reviewed  Tests in the radiology section of CPT®: reviewed  Decide to obtain previous medical records or to obtain history from someone other than the patient: yes  Obtain history from someone other than the patient: yes  Discuss the patient with other providers: yes    Patient Progress  Patient progress: stable    CritCare Time    Disposition  Final diagnoses:   [facility] ([facility] )   L1 vertebral fracture ([facility] )   Fall   Thrombocytopenia ([facility] )   Chronic renal insufficiency     Time reflects when diagnosis was documented in both MDM as applicable and the Disposition within this note     Time User Action Codes Description Comment    11/7/2018  3:26 PM Rhona Bright [S22 089A] Closed T12 fracture ([facility] )     11/7/2018  3:26 PM Rhona Bright [S32 019A] L1 vertebral fracture ([facility] )     11/7/2018  3:27 PM Rhona Bright [W19  XXXA] Fall     11/7/2018  6:45 PM Rhona Bright [D69 6] Thrombocytopenia ([facility] )     11/7/2018  6:45 PM Rhona Bright [N18 9] Chronic renal insufficiency       ED Disposition     ED Disposition Condition Comment    Admit  Case was discussed with Dr Rosanna Benton and the patient's admission status was agreed to be Admission Status: inpatient status to the service of Dr Rosanna Benton   Follow-up Information    None         Current Discharge Medication List      CONTINUE these medications which have NOT CHANGED    Details   acetaminophen (TYLENOL) 325 mg tablet Take 2 tablets by mouth every 6 (six) hours as needed for mild pain  Qty: 30 tablet, Refills: 0      Amino Acids (LIQUACEL) LIQD Take 30 mL by mouth daily      aspirin (ECOTRIN LOW STRENGTH) 81 mg EC tablet Take 81 mg by mouth daily      benzonatate (TESSALON PERLES) 100 mg capsule Take 100 mg by mouth every 6 (six) hours as needed for cough      Cholecalciferol (VITAMIN D3) 2000 UNITS TABS Take 2,000 Units by mouth daily  cyanocobalamin (VITAMIN B-12) 1,000 mcg tablet Take 1,000 mcg by mouth daily  docusate sodium (COLACE) 100 mg capsule Take 1 capsule (100 mg total) by mouth 2 (two) times a day Prn constipation  Qty: 20 capsule, Refills: 0    Associated Diagnoses: Lumbar compression fracture (HCC)      finasteride (PROSCAR) 5 mg tablet Take 5 mg by mouth daily        furosemide (LASIX) 20 mg tablet Take 10 mg by mouth every morning        gabapentin (NEURONTIN) 100 mg capsule Take 1 capsule by mouth daily at bedtime  Qty: 5 capsule, Refills: 0      Lidocaine 4 % PTCH Apply 1 patch topically every 12 (twelve) hours as needed (pain)  Qty: 15 patch, Refills: 0    Associated Diagnoses: Lumbar compression fracture (HCC)      loratadine (CLARITIN) 10 mg tablet Take 10 mg by mouth daily as needed for allergies      !! nystatin (MYCOSTATIN) powder Apply 1 application topically 2 (two) times a day      !! nystatin (MYCOSTATIN) powder Apply 1 application topically 2 (two) times a day      oxyCODONE-acetaminophen (PERCOCET) 5-325 mg per tablet Take 1 tablet by mouth every 6 (six) hours as needed for moderate pain (May use up to two tablets every 6 hours ) for up to 10 days Max Daily Amount: 4 tablets  Qty: 20 tablet, Refills: 0    Associated Diagnoses: Lumbar compression fracture (HCC)      Schraggers Paste Apply 1 application topically 2 (two) times a day      sodium chloride (OCEAN) 0 65 % nasal spray 2 sprays into each nostril 3 (three) times a day  Tamsulosin HCl (FLOMAX PO) Take 0 4 mg by mouth daily         ! ! - Potential duplicate medications found  Please discuss with provider  No discharge procedures on file      ED Provider  Electronically Signed by           Tash Barroso DO  11/07/18 6340

## 2018-11-07 NOTE — ASSESSMENT & PLAN NOTE
· Reported increased pain and difficultly with transferring at [address]  Most likely related to recent fall with fx  · At baseline, patient typically requires assistance for transfer from recliner to wheelchair, then self-propels   Usually 1 time per day he ambulates from his room to the dining chavez with a walker and assistance of aides  · PT/OT eval  · CM following  · Anticipate STR prior to return to previous level of care

## 2018-11-07 NOTE — ASSESSMENT & PLAN NOTE
· Noted yesterday after sustaining fall at [facility]  Was in this ED for same  D/C'ed back to [facility] with pain control, stool softeners, and TLSO brace  Increased pain today  · CT results: 1  Widening of the T12-L1 disc space consistent with traumatic distraction  Acute fracture of the L1 vertebral body superior endplate which appears superimposed on a chronic fracture deformity  Small amount of paravertebral hematoma at T12/L1  2  Nondisplaced fracture right T12 pedicle (series 602 image 44)  On sagittal imaging there also appears to be a fracture of the left posterior inferior T12 vertebral body corner with vertical extension along the junction of the vertebral body and pedicle (series 602 images 60-61)     · Pain control: Oxy IR 2 5mg Q6 PRN moderate pain; oxy IR 5 mg q6 PRN severe pain; dilaudid 0 2mg IV Q4 PRN breakthrough; scheduled tylenol; continue home gabapentin dose of 100mg PO QHS  · PT/OT eval  · CM following

## 2018-11-08 LAB
ANION GAP SERPL CALCULATED.3IONS-SCNC: 9 MMOL/L (ref 4–13)
BUN SERPL-MCNC: 22 MG/DL (ref 5–25)
CALCIUM SERPL-MCNC: 8.2 MG/DL (ref 8.3–10.1)
CHLORIDE SERPL-SCNC: 109 MMOL/L (ref 100–108)
CO2 SERPL-SCNC: 26 MMOL/L (ref 21–32)
CREAT SERPL-MCNC: 1.3 MG/DL (ref 0.6–1.3)
ERYTHROCYTE [DISTWIDTH] IN BLOOD BY AUTOMATED COUNT: 13.9 % (ref 11.6–15.1)
GFR SERPL CREATININE-BSD FRML MDRD: 48 ML/MIN/1.73SQ M
GLUCOSE SERPL-MCNC: 107 MG/DL (ref 65–140)
HCT VFR BLD AUTO: 37.9 % (ref 36.5–49.3)
HGB BLD-MCNC: 12.5 G/DL (ref 12–17)
MCH RBC QN AUTO: 29.3 PG (ref 26.8–34.3)
MCHC RBC AUTO-ENTMCNC: 33 G/DL (ref 31.4–37.4)
MCV RBC AUTO: 89 FL (ref 82–98)
PLATELET # BLD AUTO: 105 THOUSANDS/UL (ref 149–390)
PMV BLD AUTO: 10.8 FL (ref 8.9–12.7)
POTASSIUM SERPL-SCNC: 3.6 MMOL/L (ref 3.5–5.3)
RBC # BLD AUTO: 4.26 MILLION/UL (ref 3.88–5.62)
SODIUM SERPL-SCNC: 144 MMOL/L (ref 136–145)
WBC # BLD AUTO: 7.14 THOUSAND/UL (ref 4.31–10.16)

## 2018-11-08 PROCEDURE — G8978 MOBILITY CURRENT STATUS: HCPCS

## 2018-11-08 PROCEDURE — 97163 PT EVAL HIGH COMPLEX 45 MIN: CPT

## 2018-11-08 PROCEDURE — 80048 BASIC METABOLIC PNL TOTAL CA: CPT | Performed by: PHYSICIAN ASSISTANT

## 2018-11-08 PROCEDURE — G8979 MOBILITY GOAL STATUS: HCPCS

## 2018-11-08 PROCEDURE — 85027 COMPLETE CBC AUTOMATED: CPT | Performed by: PHYSICIAN ASSISTANT

## 2018-11-08 PROCEDURE — 99232 SBSQ HOSP IP/OBS MODERATE 35: CPT | Performed by: PHYSICIAN ASSISTANT

## 2018-11-08 RX ADMIN — NYSTATIN 1 APPLICATION: 100000 POWDER TOPICAL at 08:47

## 2018-11-08 RX ADMIN — ASPIRIN 81 MG: 81 TABLET, COATED ORAL at 08:47

## 2018-11-08 RX ADMIN — HEPARIN SODIUM 5000 UNITS: 5000 INJECTION, SOLUTION INTRAVENOUS; SUBCUTANEOUS at 05:37

## 2018-11-08 RX ADMIN — TAMSULOSIN HYDROCHLORIDE 0.4 MG: 0.4 CAPSULE ORAL at 08:47

## 2018-11-08 RX ADMIN — FINASTERIDE 5 MG: 5 TABLET, FILM COATED ORAL at 08:47

## 2018-11-08 RX ADMIN — CYANOCOBALAMIN TAB 500 MCG 1000 MCG: 500 TAB at 08:47

## 2018-11-08 RX ADMIN — Medication 1 APPLICATION: at 17:00

## 2018-11-08 RX ADMIN — HEPARIN SODIUM 5000 UNITS: 5000 INJECTION, SOLUTION INTRAVENOUS; SUBCUTANEOUS at 21:01

## 2018-11-08 RX ADMIN — ACETAMINOPHEN 975 MG: 325 TABLET, FILM COATED ORAL at 21:01

## 2018-11-08 RX ADMIN — OXYCODONE HYDROCHLORIDE 5 MG: 5 TABLET ORAL at 19:09

## 2018-11-08 RX ADMIN — DOCUSATE SODIUM 100 MG: 100 CAPSULE, LIQUID FILLED ORAL at 17:00

## 2018-11-08 RX ADMIN — VITAMIN D, TAB 1000IU (100/BT) 2000 UNITS: 25 TAB at 08:47

## 2018-11-08 RX ADMIN — NYSTATIN 1 APPLICATION: 100000 POWDER TOPICAL at 17:00

## 2018-11-08 RX ADMIN — HEPARIN SODIUM 5000 UNITS: 5000 INJECTION, SOLUTION INTRAVENOUS; SUBCUTANEOUS at 14:05

## 2018-11-08 RX ADMIN — Medication 1 APPLICATION: at 08:48

## 2018-11-08 RX ADMIN — GABAPENTIN 100 MG: 100 CAPSULE ORAL at 21:01

## 2018-11-08 RX ADMIN — ACETAMINOPHEN 975 MG: 325 TABLET, FILM COATED ORAL at 14:05

## 2018-11-08 RX ADMIN — ACETAMINOPHEN 975 MG: 325 TABLET, FILM COATED ORAL at 05:36

## 2018-11-08 RX ADMIN — DOCUSATE SODIUM 100 MG: 100 CAPSULE, LIQUID FILLED ORAL at 08:47

## 2018-11-08 NOTE — ASSESSMENT & PLAN NOTE
· Noted yesterday after sustaining fall at [facility]  Was in this ED for same  D/C'ed back to [facility] with pain control, stool softeners, and TLSO brace  Increased pain today  · CT results: 1  Widening of the T12-L1 disc space consistent with traumatic distraction  Acute fracture of the L1 vertebral body superior endplate which appears superimposed on a chronic fracture deformity  Small amount of paravertebral hematoma at T12/L1  2  Nondisplaced fracture right T12 pedicle (series 602 image 44)  On sagittal imaging there also appears to be a fracture of the left posterior inferior T12 vertebral body corner with vertical extension along the junction of the vertebral body and pedicle (series 602 images 60-61)     · Pain control: Oxy IR 2 5mg Q6 PRN moderate pain; oxy IR 5 mg q6 PRN severe pain; dilaudid 0 2mg IV Q4 PRN breakthrough; scheduled tylenol; continue home gabapentin dose of 100mg PO QHS  · PT/OT eval-- STR recommended  · CM following

## 2018-11-08 NOTE — SOCIAL WORK
CM sent referral to [facility] for review  CM will follow patient for discharge planning purposes

## 2018-11-08 NOTE — PLAN OF CARE
DISCHARGE PLANNING     Discharge to home or other facility with appropriate resources Progressing        DISCHARGE PLANNING - CARE MANAGEMENT     Discharge to post-acute care or home with appropriate resources Progressing        MUSCULOSKELETAL - ADULT     Maintain or return mobility to safest level of function Progressing        PAIN - ADULT     Verbalizes/displays adequate comfort level or baseline comfort level Progressing        Potential for Falls     Patient will remain free of falls Progressing        Prexisting or High Potential for Compromised Skin Integrity     Skin integrity is maintained or improved Progressing        SKIN/TISSUE INTEGRITY - ADULT     Skin integrity remains intact Progressing     Incision(s), wounds(s) or drain site(s) healing without S/S of infection Progressing

## 2018-11-08 NOTE — ASSESSMENT & PLAN NOTE
· Noted yesterday after sustaining fall at [facility]  Was in this ED for same  D/C'ed back to [facility] with pain control, stool softeners, and TLSO brace  Increased pain today  · CT results: 1  Widening of the T12-L1 disc space consistent with traumatic distraction  Acute fracture of the L1 vertebral body superior endplate which appears superimposed on a chronic fracture deformity  Small amount of paravertebral hematoma at T12/L1  2  Nondisplaced fracture right T12 pedicle (series 602 image 44)  On sagittal imaging there also appears to be a fracture of the left posterior inferior T12 vertebral body corner with vertical extension along the junction of the vertebral body and pedicle (series 602 images 60-61)     · Pain control: Oxy IR 2 5mg Q6 PRN moderate pain; oxy IR 5 mg q6 PRN severe pain; dilaudid 0 2mg IV Q4 PRN breakthrough; scheduled tylenol; continue home gabapentin dose of 100mg PO QHS  · PT/OT eval-- STR recommended  · CM following

## 2018-11-08 NOTE — PLAN OF CARE
Problem: PHYSICAL THERAPY ADULT  Goal: Performs mobility at highest level of function for planned discharge setting  See evaluation for individualized goals  Treatment/Interventions: Functional transfer training, LE strengthening/ROM, Therapeutic exercise, Endurance training, Patient/family training, Bed mobility, Gait training, Spoke to nursing, Spoke to case management  Equipment Recommended: Wheelchair, Jimmie Fernandez (walker for transfers )       See flowsheet documentation for full assessment, interventions and recommendations  Prognosis: Fair  Problem List: Decreased strength, Decreased range of motion, Decreased endurance, Impaired balance, Decreased mobility, Decreased coordination, Decreased cognition, Decreased skin integrity, Pain (gait deviations)  Assessment: Pt is a 80 y o  male that presents to [facility] w/ T12, L1 fracture after a fall  Pt recently DCed from ED and was fit for TLSO brace  Prior to admission, pt was living at [facility], needed minimal assistance w/ ADLs, used a wheelchair to ambulate, and needed assistance w/ transfers  Upon evaluation, pt required max A x 2 for bed mobility and transfers w/ rolling walker  Pt was dependent w/ donning TLSO brace  Pt presented w/ decreased strength, endurance, balance, mobility, and coordination  Pt required verbal instruction for walker management and hand placement during transfers  Pt's condition is currently unstable/unpredictable given the functional mobility deficits above and his decreased skin integrity, history of falls, decreased cognition, increased respiratory rate, and decreased platelets  Pt will benefit from skilled physical therapy while in the hospital and upon discharge to address the deficits above and return to PLOF  From PT/mobility standpoint, recommendation at time of d/c would be rehab pending progress to maximize pt's functional independence   Recommend performing transfers, bed mobility, and wheelchair management/propulsion next session  Barriers to Discharge: Inaccessible home environment, Decreased caregiver support  Barriers to Discharge Comments: Will need physical assistance for mobility upon DC  Recommendation: Post acute IP rehab          See flowsheet documentation for full assessment

## 2018-11-08 NOTE — PLAN OF CARE
DISCHARGE PLANNING     Discharge to home or other facility with appropriate resources Progressing        MUSCULOSKELETAL - ADULT     Maintain or return mobility to safest level of function Progressing        PAIN - ADULT     Verbalizes/displays adequate comfort level or baseline comfort level Progressing        Potential for Falls     Patient will remain free of falls Progressing        Prexisting or High Potential for Compromised Skin Integrity     Skin integrity is maintained or improved Progressing        SKIN/TISSUE INTEGRITY - ADULT     Skin integrity remains intact Progressing     Incision(s), wounds(s) or drain site(s) healing without S/S of infection Progressing

## 2018-11-08 NOTE — PLAN OF CARE
Problem: DISCHARGE PLANNING - CARE MANAGEMENT  Goal: Discharge to post-acute care or home with appropriate resources  INTERVENTIONS:  - Conduct assessment to determine patient/family and health care team treatment goals, and need for post-acute services based on payer coverage, community resources, and patient preferences, and barriers to discharge  - Address psychosocial, clinical, and financial barriers to discharge as identified in assessment in conjunction with the patient/family and health care team  - Arrange appropriate level of post-acute services according to patients   needs and preference and payer coverage in collaboration with the physician and health care team  - Communicate with and update the patient/family, physician, and health care team regarding progress on the discharge plan  - Arrange appropriate transportation to post-acute venues   Outcome: Progressing  CM sent referral to [facility] for review  CM will follow patient for discharge planning purposes

## 2018-11-08 NOTE — UTILIZATION REVIEW
Initial Clinical Review    Admission: Date/Time/Statement: 11/7/18 @ 1534     Orders Placed This Encounter   Procedures    Inpatient Admission (expected length of stay for this patient is greater than two midnights)     Standing Status:   Standing     Number of Occurrences:   1     Order Specific Question:   Admitting Physician     Answer:   Maikol Gorman [[id number]]     Order Specific Question:   Level of Care     Answer:   Med Surg [16]     Order Specific Question:   Estimated length of stay     Answer:   More than 2 Midnights     Order Specific Question:   Certification     Answer:   I certify that inpatient services are medically necessary for this patient for a duration of greater than two midnights  See H&P and MD Progress Notes for additional information about the patient's course of treatment  ED: Date/Time/Mode of Arrival:   ED Arrival Information     Expected Arrival Acuity Means of Arrival Escorted By Service Admission Type    - 11/7/2018 11:58 [facility] General Medicine Urgent    Arrival Complaint    unable to walk          Chief Complaint:   Chief Complaint   Patient presents with    Gait Problem     from [facility] via EMS post fall on Monday  PT has L1 FX, unable to currently ambulate/reevaluate       History of Illness: A 81 y/o female presented to the ED with c/o back pain from a fall on Monday  Patient was seen/evaluated yesterday in the ED for same      ED Vital Signs:   ED Triage Vitals   Temperature Pulse Respirations Blood Pressure SpO2   11/07/18 1202 11/07/18 1202 11/07/18 1202 11/07/18 1202 11/07/18 1202   99 3 °F (37 4 °C) 67 16 159/78 93 %      Temp Source Heart Rate Source Patient Position - Orthostatic VS BP Location FiO2 (%)   11/07/18 1202 11/07/18 1202 11/07/18 1202 11/07/18 1202 --   Oral Monitor Sitting Right arm       Pain Score       11/07/18 1210       No Pain        Wt Readings from Last 1 Encounters:   11/07/18 87 kg (191 lb 12 8 oz) Vital Signs (abnormal):   Date and Time Temp Pulse SpO2 Resp BP   11/07/18 1600 -- 58 96 % --  186/99   11/07/18 1544 -- 75 95 % 18  188/96   11/07/18 1500 -- -- -- -- 169/78   11/07/18 1400 -- 62 93 % -- 166/80   11/07/18 1330 -- -- -- --  178/84   11/07/18 1315 -- 62 94 % -- --   11/07/18 1300 -- 56 94 % -- 155/83     Abnormal Labs/Diagnostic Test Results: Platelets 066, Neutro 76%, Creat 1 39    CT of Thoracolumbar Spine:   1  Widening of the T12-L1 disc space consistent with traumatic distraction   Acute fracture of the L1 vertebral body superior endplate which appears superimposed on a chronic fracture deformity   Small amount of paravertebral hematoma at T12/L1  2  Nondisplaced fracture right T12 pedicle (series 602 image 44)   On sagittal imaging there also appears to be a fracture of the left posterior inferior T12 vertebral body corner with vertical extension along the junction of the vertebral body and   pedicle (series 602 images 60-61)  3  Chronic fracture deformity of T9   Cannot exclude superimposed acute superior endplate compression fracture along the left anterior endplate  4  Mild thoracolumbar dextroscoliosis with advanced multilevel degenerative changes and bridging marginal endplate osteophytes with multifocal fusion  5  Additional incidental findings as above including cholelithiasis, colonic diverticulosis, suspected right renal cyst and pulmonary vascular prominence      ED Treatment:   Medication Administration from 11/07/2018 1158 to 11/07/2018 1648       Date/Time Order Dose Route Action Action by Comments     11/07/2018 1613 oxyCODONE-acetaminophen (PERCOCET) 5-325 mg per tablet 1 tablet 1 tablet Oral Given Denney Lundborg, RN      11/07/2018 1613 lidocaine (LIDODERM) 5 % patch 1 patch 1 patch Topical Medication Applied Denney Lundborg, RN           Past Medical/Surgical History:   Diagnosis    Actinic keratosis    Anemia    Atrial fibrillation ([facility] [address] )    Benign prostatic hypertrophy    Blood in urine    Cellulitis    Dermatitis    Disease of thyroid gland    Dyslipidemia    Epidural hematoma (HCC)    Epistaxis    Falling    Folate deficiency    GERD (gastroesophageal reflux disease)    Hip fracture (HCC)    Hyperlipidemia    Hypertension    Hypothyroidism (acquired)    Insomnia    Neuropathy    Nocturia    Osteoarthritis    Psychiatric disorder    Renal disorder    Sepsis ([facility] [address] )    Shoulder pain, right    Squamous cell carcinoma    Subdural hematoma (HCC)    Thrombophlebitis    Urinary retention       Admitting Diagnosis: Fall [W19  XXXA]  Inability to walk [R26 2]  Closed T12 fracture ([facility] [address] ) [S22 089A]  [facility] [S32 019A]    Age/Sex: 80 y o  male    Assessment/Plan:     * Closed L1 vertebral fracture (HCC)   Assessment & Plan     · Noted yesterday after sustaining fall at [facility]  Was in this ED for same  D/C'ed back to [facility] assisted living with pain control, stool softeners, and TLSO brace  Increased pain today  · CT results: 1  Widening of the T12-L1 disc space consistent with traumatic distraction   Acute fracture of the L1 vertebral body superior endplate which appears superimposed on a chronic fracture deformity   Small amount of paravertebral hematoma at T12/L1  2  Nondisplaced fracture right T12 pedicle (series 602 image 44)   On sagittal imaging there also appears to be a fracture of the left posterior inferior T12 vertebral body corner with vertical extension along the junction of the vertebral body and pedicle (series 602 images 60-61)  · Pain control: Oxy IR 2 5mg Q6 PRN moderate pain; oxy IR 5 mg q6 PRN severe pain; dilaudid 0 2mg IV Q4 PRN breakthrough; scheduled tylenol; continue home gabapentin dose of 100mg PO QHS  · PT/OT eval  · CM following       Closed T12 fracture (HCC)   Assessment & Plan     · Noted yesterday after sustaining fall at [facility]  Was in this ED for same   D/C'ed back to [facility] assisted living with pain control, stool softeners, and TLSO brace  Increased pain today  · CT results: 1  Widening of the T12-L1 disc space consistent with traumatic distraction   Acute fracture of the L1 vertebral body superior endplate which appears superimposed on a chronic fracture deformity   Small amount of paravertebral hematoma at T12/L1  2  Nondisplaced fracture right T12 pedicle (series 602 image 44)   On sagittal imaging there also appears to be a fracture of the left posterior inferior T12 vertebral body corner with vertical extension along the junction of the vertebral body and pedicle (series 602 images 60-61)  · Pain control: Oxy IR 2 5mg Q6 PRN moderate pain; oxy IR 5 mg q6 PRN severe pain; dilaudid 0 2mg IV Q4 PRN breakthrough; scheduled tylenol; continue home gabapentin dose of 100mg PO QHS  · PT/OT eval  · CM following      Ambulatory dysfunction   Assessment & Plan     · Reported increased pain and difficultly with transferring at [facility]  Most likely related to recent fall with fx  · At baseline, patient typically requires assistance for transfer from recliner to wheelchair, then self-propels  Usually 1 time per day he ambulates from his room to the dining chavez with a walker and assistance of aides  · PT/OT eval  · CM following  · Anticipate STR prior to return to previous level of care       VTE Prophylaxis: Heparin  / sequential compression device   Code Status: Level 1-- Full Code  POLST: There is no POLST form on file for this patient (pre-hospital)  Discussion with family: daughter Dennis Pedersen at bedside      Anticipated Length of Stay:  Patient will be admitted on an Inpatient basis with an anticipated length of stay of  > 2 midnights     Justification for Hospital Stay: pain control, anticipate PT/OT eval will reveal need for STR    Admission Orders:  Inpatient/MedSurg  Bilateral Sequential Compression Device  OT/PT Evaluations   [place] Pad q1h    Scheduled Meds:   Current Facility-Administered Medications:  acetaminophen 975 mg Oral Q8H [address] Deborah Olivera, BEAN   aspirin 81 mg Oral Daily Deborah Olivera, BEAN   cholecalciferol 2,000 Units Oral Daily Deborah MACK Olivera, PA-JEF   cyanocobalamin 1,000 mcg Oral Daily Deborah MACK Olivera, BEAN   docusate sodium 100 mg Oral BID Deborah MACK Olivera, BEAN   finasteride 5 mg Oral Daily Deborah Olivera, BEAN   gabapentin 100 mg Oral HS Deborah MACK Olivera, BEAN   heparin (porcine) 5,000 Units Subcutaneous Q8H [address] Deborah Olivera, BEAN   nystatin 1 application Topical BID Deborah Olivera, BEAN   oxyCODONE 2 5 mg Oral Q4H PRN Deborah Olivera, BEAN   oxyCODONE 5 mg Oral Q4H PRN Deborah Olivera, BEAN   Schraggers 1 application Topical BID Deborah Olivera, BEAN   tamsulosin 0 4 mg Oral Daily Deborah Olivera PA-C     PRN Meds: benzonatate    hydrALAZINE    HYDROmorphone    loratadine    morphine injection    oxyCODONE    oxyCODONE    sodium chloride

## 2018-11-08 NOTE — PHYSICAL THERAPY NOTE
PHYSICAL THERAPY NOTE    Patient Name: Zelalem Valdivia  HEXTU'K Date: 18 1032   Note Type   Note type Eval only   Pain Assessment   Pain Assessment No/denies pain   Pain Score No Pain   Pain Location Back   Pain Orientation Lower   Home Living   Type of Home Assisted living  ([facility])   Home Layout One level   [address]   Additional Comments Pt unable to report if he uses walker w/ transfers   Prior Function   Level of [place] Total dependent   Lives With Other (Comment)  ([address] [facility] [address] [address])   Receives Help From Other (Comment)  ([address] [facility] [address] [address])   ADL Assistance Needs assistance   IADLs Needs assistance   Falls in the last 6 months 1 to 4   Vocational Retired   Restrictions/Precautions   [place] Bearing Precautions Per Order No   Braces or Orthoses TLSO  (Pt fitted for TLSO on Last ED visit per notes)   Other Precautions Chair Alarm; Bed Alarm; Fall Risk;Pain;Hard of hearing;Cognitive   General   Additional Pertinent History Pt has history of falls, impaired cognition   Family/Caregiver Present No   Cognition   Overall Cognitive Status Impaired   Arousal/Participation Cooperative   Orientation Level Oriented to person; Other (Comment); Disoriented to situation;Disoriented to place  (Increased time for , )   Memory Decreased short term memory;Decreased recall of recent events   Following Commands Follows one step commands inconsistently   RUE Assessment   RUE Assessment X   RUE Overall AROM   R Shoulder Flexion 90 degrees   LUE Assessment   LUE Assessment X   LUE Overall AROM   L Shoulder Flexion 90 degrees   RLE Assessment   RLE Assessment (increased girth noted at R ankle)   Strength RLE   R Hip Flexion 3/5   R Knee Extension 4/5   R Ankle Dorsiflexion 4-/5   R Ankle Plantar Flexion 4/5   Strength LLE   L Hip Flexion 3/5   L Knee Extension 4/5   L Ankle Dorsiflexion 4/5   L Ankle Plantar Flexion 4/5   Coordination Movements are Fluid and Coordinated 0   Coordination and Movement Description tremor, impaired finger to nose   Sensation X  (vison intact, [place])   Light Touch   RLE Light Touch Grossly intact   LLE Light Touch Grossly intact   Bed Mobility   Supine to Sit 2  Maximal assistance   Additional items Assist x 2;HOB elevated; Bedrails; Increased time required;LE management   Additional Comments dependent w/ donning TLSO brace   Transfers   Sit to Stand 2  Maximal assistance   Additional items Assist x 2; Increased time required;Verbal cues  (instruction for hand placement)   Stand to Sit 2  Maximal assistance   Additional items Assist x 2; Increased time required;Verbal cues  (intstruction for hand placement)   Additional Comments Posture: substantial kyphosis, forward head, rounded shoulders   Ambulation/Elevation   Gait pattern Excessively slow; Step to;Short stride; Foward flexed; Wide [place]  (From EOB to recliner, steppage transfer)   Gait Assistance 2  Maximal assist   Additional items Assist x 2;Verbal cues  (walker management)   Assistive Device Rolling walker   Distance 3'   Stair Management Assistance Not tested   Balance   Static Sitting Poor +   Static Standing Zero   Ambulatory Zero   Endurance Deficit   Endurance Deficit Yes   Endurance Deficit Description due to fatigue, increased respiration rate, pain   Activity Tolerance   Activity Tolerance Patient limited by fatigue;Patient limited by pain   Nurse Made Aware Spoke to [facility] RN   Assessment   Prognosis Fair   Problem List Decreased strength;Decreased range of motion;Decreased endurance; Impaired balance;Decreased mobility; Decreased coordination;Decreased cognition;Decreased skin integrity;Pain  (gait deviations)   Assessment Pt is a 80 y o  male that presents to [facility] w/ T12, L1 fracture after a fall  Pt recently DCed from ED and was fit for TLSO brace   Prior to admission, pt was living at [facility], needed minimal assistance w/ ADLs, used a wheelchair to ambulate, and needed assistance w/ transfers  Upon evaluation, pt required max A x 2 for bed mobility and transfers w/ rolling walker  Pt was dependent w/ donning TLSO brace  Pt presented w/ decreased strength, endurance, balance, mobility, and coordination  Pt required verbal instruction for walker management and hand placement during transfers  Pt's condition is currently unstable/unpredictable given the functional mobility deficits above and his decreased skin integrity, history of falls, decreased cognition, increased respiratory rate, and decreased platelets  Pt will benefit from skilled physical therapy while in the hospital and upon discharge to address the deficits above and return to PLOF  From PT/mobility standpoint, recommendation at time of d/c would be rehab pending progress to maximize pt's functional independence  Recommend performing transfers, bed mobility, and wheelchair management/propulsion next session  Barriers to Discharge Inaccessible home environment;Decreased caregiver support   Barriers to Discharge Comments Will need physical assistance for mobility upon DC   Goals   Patient Goals pt did not state goals   STG Expiration Date 11/18/18   Short Term Goal #1 Pt will be mod A x1 w/ bed mobility to improve functional mobility and prevent skin breakdown  Pt will be mod A x1 w/ transfers to decrease risk of falls and decrease burden of care  Pt will be min A x 1 w/ wheelchair management/propulsion to improve functional independence in his environment  Pt will be able to instruct others >50% of the time to assist with donning TLSO brace to improve compliance and consistency of brace wear  Treatment Day 0   Plan   Treatment/Interventions Functional transfer training;LE strengthening/ROM; Therapeutic exercise; Endurance training;Patient/family training;Bed mobility;Gait training;Spoke to nursing;Spoke to case management   PT Frequency Other (Comment)  (3-5x/week)   Recommendation   Recommendation Post acute IP rehab   Equipment Recommended Wheelchair;Walker  (walker for transfers )   Barthel Index   Feeding 0   Bathing 0   Grooming Score 0   Dressing Score 0   Bladder Score 0   Bowels Score 0   Toilet Use Score 0   Transfers (Bed/Chair) Score 5   Mobility (Level Surface) Score 0   Stairs Score 0   Barthel Index Score 5   Kain Canales, SPT

## 2018-11-08 NOTE — PROGRESS NOTES
Progress Note - Leslie Paniagua 1/15/1929, 80 y o  male MRN: [id number]    Unit/Bed#: -01 Encounter: [id number]    Primary Care Provider: Chandler Chaudhry   Date and time admitted to hospital: 11/7/2018 11:58 AM    * Closed L1 vertebral fracture ([facility] [address] )   Assessment & Plan    · Noted yesterday after sustaining fall at [address]  Was in this ED for same  D/C'ed back to [facility] assisted living with pain control, stool softeners, and TLSO brace  Increased pain today  · CT results: 1  Widening of the T12-L1 disc space consistent with traumatic distraction  Acute fracture of the L1 vertebral body superior endplate which appears superimposed on a chronic fracture deformity  Small amount of paravertebral hematoma at T12/L1  2  Nondisplaced fracture right T12 pedicle (series 602 image 44)  On sagittal imaging there also appears to be a fracture of the left posterior inferior T12 vertebral body corner with vertical extension along the junction of the vertebral body and pedicle (series 602 images 60-61)  · Pain control: Oxy IR 2 5mg Q6 PRN moderate pain; oxy IR 5 mg q6 PRN severe pain; dilaudid 0 2mg IV Q4 PRN breakthrough; scheduled tylenol; continue home gabapentin dose of 100mg PO QHS  · PT/OT eval-- STR recommended  · CM following      Closed T12 fracture ([address] )   Assessment & Plan    · Noted yesterday after sustaining fall at [address]  Was in this ED for same  D/C'ed back to [facility] assisted living with pain control, stool softeners, and TLSO brace  Increased pain today  · CT results: 1  Widening of the T12-L1 disc space consistent with traumatic distraction  Acute fracture of the L1 vertebral body superior endplate which appears superimposed on a chronic fracture deformity  Small amount of paravertebral hematoma at T12/L1  2  Nondisplaced fracture right T12 pedicle (series 602 image 44)    On sagittal imaging there also appears to be a fracture of the left posterior inferior T12 vertebral body corner with vertical extension along the junction of the vertebral body and pedicle (series 602 images 60-61)  · Pain control: Oxy IR 2 5mg Q6 PRN moderate pain; oxy IR 5 mg q6 PRN severe pain; dilaudid 0 2mg IV Q4 PRN breakthrough; scheduled tylenol; continue home gabapentin dose of 100mg PO QHS  · PT/OT eval-- STR recommended  · CM following     Ambulatory dysfunction   Assessment & Plan    · Reported increased pain and difficultly with transferring at [facility]  Most likely related to recent fall with fx  · At baseline, patient typically requires assistance for transfer from recliner to wheelchair, then self-propels  Usually 1 time per day he ambulates from his room to the dining chavez with a walker and assistance of aides  · PT/OT eval  · CM following  · Anticipate STR prior to return to previous level of care -- will need 3 midnights stay     Essential hypertension   Assessment & Plan    · BP elevated, suspect related to pain  · Continue pain control  · Continue home BP medications  · Hydralazine 10mg IV Q6 PRN SBP >180     Atrial fibrillation (HCC)   Assessment & Plan    · Still in rate controlled a fib today  · Not on anticoagulation 2/2 fall risk      Other hyperlipidemia   Assessment & Plan    · Patient is not currently on a statin       VTE Pharmacologic Prophylaxis:   Pharmacologic: Heparin  Mechanical VTE Prophylaxis in Place: Yes    Patient Centered Rounds: I have performed bedside rounds with nursing staff today  Discussions with Specialists or Other Care Team Provider: CM    Education and Discussions with Family / Patient: patient, daughter Fabrice Kay via phone    Time Spent for Care: 30 minutes  More than 50% of total time spent on counseling and coordination of care as described above      Current Length of Stay: 1 day(s)    Current Patient Status: Inpatient   Certification Statement: The patient will continue to require additional inpatient hospital stay due to need for STR    Discharge Plan: d/c to STR when medically stable-- will need 3 midnights-- anticipate d/c on Saturday    Code Status: Level 1 - Full Code      Subjective:   Patient offers no complaints today  Tolerating TLSO brace well  RN reports no complaints of pain or need for pain medications  Objective:     Vitals:   Temp (24hrs), Av 8 °F (37 1 °C), Min:98 2 °F (36 8 °C), Max:99 5 °F (37 5 °C)    Temp:  [98 2 °F (36 8 °C)-99 5 °F (37 5 °C)] 98 3 °F (36 8 °C)  HR:  [56-75] 58  Resp:  [16-24] 20  BP: (138-188)/(73-99) 162/73  SpO2:  [92 %-96 %] 96 %  Body mass index is 29 16 kg/m²  Input and Output Summary (last 24 hours): Intake/Output Summary (Last 24 hours) at 18 1152  Last data filed at 18 0800   Gross per 24 hour   Intake              120 ml   Output              179 ml   Net              -59 ml       Physical Exam:     Physical Exam   Constitutional: He is oriented to person, place, and time  No distress  HENT:   Head: Normocephalic and atraumatic  Mouth/Throat: No oropharyngeal exudate  Eyes: Pupils are equal, round, and reactive to light  Conjunctivae and EOM are normal  No scleral icterus  Neck: No JVD present  Cardiovascular: Exam reveals no gallop and no friction rub  No murmur heard  irreg irreg   Pulmonary/Chest: Effort normal and breath sounds normal  No respiratory distress  He has no wheezes  He has no rales  Abdominal: Soft  Bowel sounds are normal  He exhibits no distension  There is no tenderness  Musculoskeletal: He exhibits no edema or deformity  TLSO brace in place   Neurological: He is alert and oriented to person, place, and time  He displays normal reflexes  No cranial nerve deficit  He exhibits normal muscle tone  Skin: Skin is warm and dry  No rash noted  He is not diaphoretic  No erythema  Psychiatric: He has a normal mood and affect   His behavior is normal        Additional Data:     Labs:      Results from last 7 days  Lab Units 18  0525 18  1613   WBC Thousand/uL 7 14 7 51 HEMOGLOBIN g/dL 12 5 13 0   HEMATOCRIT % 37 9 39 1   PLATELETS Thousands/uL 105* 106*   NEUTROS PCT %  --  76*   LYMPHS PCT %  --  13*   MONOS PCT %  --  10   EOS PCT %  --  1       Results from last 7 days  Lab Units 11/08/18  0526   POTASSIUM mmol/L 3 6   CHLORIDE mmol/L 109*   CO2 mmol/L 26   BUN mg/dL 22   CREATININE mg/dL 1 30   ANION GAP mmol/L 9   CALCIUM mg/dL 8 2*                           * I Have Reviewed All Lab Data Listed Above  * Additional Pertinent Lab Tests Reviewed:  [address] Admission Reviewed    Imaging:    Imaging Reports Reviewed Today Include: none  Imaging Personally Reviewed by Myself Includes:  none    Recent Cultures (last 7 days):           Last 24 Hours Medication List:     Current Facility-Administered Medications:  acetaminophen 975 mg Oral Q8H [facility] Deborah MACK Olivera, PA-C   aspirin 81 mg Oral Daily herminia, PA-C   benzonatate 100 mg Oral Q6H PRN [facility]   cholecalciferol 2,000 Units Oral Daily [facility], PA-C   cyanocobalamin 1,000 mcg Oral Daily [facility], PA-C   docusate sodium 100 mg Oral BID [facility], PA-C   finasteride 5 mg Oral Daily [facility], PA-C   gabapentin 100 mg Oral HS [facility], PA-C   heparin (porcine) 5,000 Units Subcutaneous Q8H [facility] Deborah MACK Olivera, PA-C   hydrALAZINE 10 mg Intravenous Q6H PRN Deborah [facility], [facility]   HYDROmorphone 0 2 mg Intravenous Q4H PRN Deborah A [facility], PA-C   loratadine 10 mg Oral Daily PRN Deborah A [facility], PA-C   morphine injection 2 mg Intravenous Once PRN Rhona Bright DO   nystatin 1 application Topical BID Deborah A Jarod, PA-C   oxyCODONE 2 5 mg Oral Q4H PRN Deborah [facility], PA-C   oxyCODONE 5 mg Oral Q4H PRN Deborah A BEAN Olivera   Schraggers 1 application Topical BID Deborah A [place], PA-C   sodium chloride 2 spray Each Nare PRN Deborah Olivera PA-C   tamsulosin 0 4 mg Oral Daily Deborah Olivera, BEAN        Today, Patient Was Seen By: Kristina Reeder PA-C    ** Please Note: Dictation voice to text software may have been used in the creation of this document   **

## 2018-11-08 NOTE — ASSESSMENT & PLAN NOTE
· Reported increased pain and difficultly with transferring at [address]  Most likely related to recent fall with fx  · At baseline, patient typically requires assistance for transfer from recliner to wheelchair, then self-propels   Usually 1 time per day he ambulates from his room to the dining chavez with a walker and assistance of aides  · PT/OT eval  · CM following  · Anticipate STR prior to return to previous level of care -- will need 3 midnights stay

## 2018-11-09 LAB — MRSA NOSE QL CULT: NORMAL

## 2018-11-09 PROCEDURE — 99232 SBSQ HOSP IP/OBS MODERATE 35: CPT | Performed by: PHYSICIAN ASSISTANT

## 2018-11-09 RX ADMIN — Medication 1 APPLICATION: at 08:22

## 2018-11-09 RX ADMIN — NYSTATIN 1 APPLICATION: 100000 POWDER TOPICAL at 18:35

## 2018-11-09 RX ADMIN — ACETAMINOPHEN 975 MG: 325 TABLET, FILM COATED ORAL at 13:13

## 2018-11-09 RX ADMIN — NYSTATIN 1 APPLICATION: 100000 POWDER TOPICAL at 08:22

## 2018-11-09 RX ADMIN — HEPARIN SODIUM 5000 UNITS: 5000 INJECTION, SOLUTION INTRAVENOUS; SUBCUTANEOUS at 05:39

## 2018-11-09 RX ADMIN — TAMSULOSIN HYDROCHLORIDE 0.4 MG: 0.4 CAPSULE ORAL at 08:18

## 2018-11-09 RX ADMIN — ACETAMINOPHEN 975 MG: 325 TABLET, FILM COATED ORAL at 21:34

## 2018-11-09 RX ADMIN — DOCUSATE SODIUM 100 MG: 100 CAPSULE, LIQUID FILLED ORAL at 08:18

## 2018-11-09 RX ADMIN — GABAPENTIN 100 MG: 100 CAPSULE ORAL at 21:34

## 2018-11-09 RX ADMIN — CYANOCOBALAMIN TAB 500 MCG 1000 MCG: 500 TAB at 08:17

## 2018-11-09 RX ADMIN — VITAMIN D, TAB 1000IU (100/BT) 2000 UNITS: 25 TAB at 08:17

## 2018-11-09 RX ADMIN — FINASTERIDE 5 MG: 5 TABLET, FILM COATED ORAL at 08:17

## 2018-11-09 RX ADMIN — Medication 1 APPLICATION: at 18:35

## 2018-11-09 RX ADMIN — HEPARIN SODIUM 5000 UNITS: 5000 INJECTION, SOLUTION INTRAVENOUS; SUBCUTANEOUS at 13:13

## 2018-11-09 RX ADMIN — ACETAMINOPHEN 975 MG: 325 TABLET, FILM COATED ORAL at 05:30

## 2018-11-09 RX ADMIN — OXYCODONE HYDROCHLORIDE 2.5 MG: 5 TABLET ORAL at 08:25

## 2018-11-09 RX ADMIN — HEPARIN SODIUM 5000 UNITS: 5000 INJECTION, SOLUTION INTRAVENOUS; SUBCUTANEOUS at 21:35

## 2018-11-09 RX ADMIN — ASPIRIN 81 MG: 81 TABLET, COATED ORAL at 08:17

## 2018-11-09 NOTE — SOCIAL WORK
CM called daughter cell however, she is currently at work and CM could not leave a message due to message machine being full  CM called the house and spoke with the son inlaw and determined that they are in fact interested in patient going to SNF part of the facility  CM will follow through discharge  Tent d c Saturday pending accepting facility and medical stability  CM will follow patient

## 2018-11-09 NOTE — PLAN OF CARE
DISCHARGE PLANNING     Discharge to home or other facility with appropriate resources Not Progressing        MUSCULOSKELETAL - ADULT     Maintain or return mobility to safest level of function Not Progressing        PAIN - ADULT     Verbalizes/displays adequate comfort level or baseline comfort level Not Progressing        Potential for Falls     Patient will remain free of falls Not Progressing        Prexisting or High Potential for Compromised Skin Integrity     Skin integrity is maintained or improved Not Progressing        SKIN/TISSUE INTEGRITY - ADULT     Skin integrity remains intact Not Progressing     Incision(s), wounds(s) or drain site(s) healing without S/S of infection Not Progressing

## 2018-11-09 NOTE — ASSESSMENT & PLAN NOTE
· Noted yesterday after sustaining fall at [facility]  Was in this ED for same  D/C'ed back to [facility] with pain control, stool softeners, and TLSO brace  Pain is more controlled today as per patient  · CT results: 1  Widening of the T12-L1 disc space consistent with traumatic distraction  Acute fracture of the L1 vertebral body superior endplate which appears superimposed on a chronic fracture deformity  Small amount of paravertebral hematoma at T12/L1  2  Nondisplaced fracture right T12 pedicle (series 602 image 44)  On sagittal imaging there also appears to be a fracture of the left posterior inferior T12 vertebral body corner with vertical extension along the junction of the vertebral body and pedicle (series 602 images 60-61)     · Pain control: Oxy IR 2 5mg Q6 PRN moderate pain; oxy IR 5 mg q6 PRN severe pain; dilaudid 0 2mg IV Q4 PRN breakthrough; scheduled tylenol; continue home gabapentin dose of 100mg PO QHS  · PT/OT eval-- STR recommended and is pending   · CM following

## 2018-11-09 NOTE — ASSESSMENT & PLAN NOTE
· Noted yesterday after sustaining fall at [facility]  Was in this ED for same  D/C'ed back to [facility] with pain control, stool softeners, and TLSO brace  Pain is improved  · CT results: 1  Widening of the T12-L1 disc space consistent with traumatic distraction  Acute fracture of the L1 vertebral body superior endplate which appears superimposed on a chronic fracture deformity  Small amount of paravertebral hematoma at T12/L1  2  Nondisplaced fracture right T12 pedicle (series 602 image 44)  On sagittal imaging there also appears to be a fracture of the left posterior inferior T12 vertebral body corner with vertical extension along the junction of the vertebral body and pedicle (series 602 images 60-61)     · Pain control: Oxy IR 2 5mg Q6 PRN moderate pain; oxy IR 5 mg q6 PRN severe pain; dilaudid 0 2mg IV Q4 PRN breakthrough; scheduled tylenol; continue home gabapentin dose of 100mg PO QHS  · PT/OT eval-- STR recommended and is pending   · CM following

## 2018-11-09 NOTE — ASSESSMENT & PLAN NOTE
· Reported increased pain and difficultly with transferring at [facility]  Most likely related to recent fall with fx  · At baseline, patient typically requires assistance for transfer from recliner to wheelchair, then self-propels   Usually 1 time per day he ambulates from his room to the dining chavez with a walker and assistance of aides  · PT/OT eval  · CM following  · Anticipate STR prior to return to previous level of care -- will need 3 midnights stay and will satisfy tomorrow

## 2018-11-09 NOTE — PLAN OF CARE
Problem: DISCHARGE PLANNING - CARE MANAGEMENT  Goal: Discharge to post-acute care or home with appropriate resources  INTERVENTIONS:  - Conduct assessment to determine patient/family and health care team treatment goals, and need for post-acute services based on payer coverage, community resources, and patient preferences, and barriers to discharge  - Address psychosocial, clinical, and financial barriers to discharge as identified in assessment in conjunction with the patient/family and health care team  - Arrange appropriate level of post-acute services according to patients   needs and preference and payer coverage in collaboration with the physician and health care team  - Communicate with and update the patient/family, physician, and health care team regarding progress on the discharge plan  - Arrange appropriate transportation to post-acute venues     Outcome: Adequate for Discharge  LOS 2 DAYS  D C SETUP FOR TMW VIA [facility] FROM [facility] TO [facility] HERMAN Amato obtained [facility] morning 10am

## 2018-11-09 NOTE — PLAN OF CARE
Problem: DISCHARGE PLANNING - CARE MANAGEMENT  Goal: Discharge to post-acute care or home with appropriate resources  INTERVENTIONS:  - Conduct assessment to determine patient/family and health care team treatment goals, and need for post-acute services based on payer coverage, community resources, and patient preferences, and barriers to discharge  - Address psychosocial, clinical, and financial barriers to discharge as identified in assessment in conjunction with the patient/family and health care team  - Arrange appropriate level of post-acute services according to patients   needs and preference and payer coverage in collaboration with the physician and health care team  - Communicate with and update the patient/family, physician, and health care team regarding progress on the discharge plan  - Arrange appropriate transportation to post-acute venues    Outcome: Progressing  CM called daughter cell however, she is currently at work and CM could not leave a message due to message machine being full  CM called the house and spoke with the son inlmary and determined that they are in fact interested in patient going to SNF part of the facility  CM will follow through discharge  Tent d c Saturday pending accepting facility and medical stability  CM will follow patient

## 2018-11-09 NOTE — PLAN OF CARE
Problem: DISCHARGE PLANNING - CARE MANAGEMENT  Goal: Discharge to post-acute care or home with appropriate resources  INTERVENTIONS:  - Conduct assessment to determine patient/family and health care team treatment goals, and need for post-acute services based on payer coverage, community resources, and patient preferences, and barriers to discharge  - Address psychosocial, clinical, and financial barriers to discharge as identified in assessment in conjunction with the patient/family and health care team  - Arrange appropriate level of post-acute services according to patients   needs and preference and payer coverage in collaboration with the physician and health care team  - Communicate with and update the patient/family, physician, and health care team regarding progress on the discharge plan  - Arrange appropriate transportation to post-acute venues     Outcome: Adequate for Discharge  CM spoke with daughter and patient about transport via [facility] and are agreeable to transfer from [address] [place] [address] to [facility] tmw  CM obtained 10am transport and IMM was reviewed with daughter at bedside  CM will follow through discharge

## 2018-11-09 NOTE — SOCIAL WORK
LOS 2 DAYS  D [facility] morning 10am

## 2018-11-09 NOTE — PROGRESS NOTES
[facility]  Progress Note - Keesha Lenardnoelle 1/15/1929, 80 y o  male MRN: [id number]    Unit/Bed#: -[facility] Encounter: [id number]    Primary Care Provider: Karly Ramos   Date and time admitted to hospital: 11/7/2018 11:58 AM        * Closed L1 vertebral fracture ([address] )   Assessment & Plan    · Noted yesterday after sustaining fall at [facility]  Was in this ED for same  D/C'ed back to [facility] assisted living with pain control, stool softeners, and TLSO brace  Pain is more controlled today as per patient  · CT results: 1  Widening of the T12-L1 disc space consistent with traumatic distraction  Acute fracture of the L1 vertebral body superior endplate which appears superimposed on a chronic fracture deformity  Small amount of paravertebral hematoma at T12/L1  2  Nondisplaced fracture right T12 pedicle (series 602 image 44)  On sagittal imaging there also appears to be a fracture of the left posterior inferior T12 vertebral body corner with vertical extension along the junction of the vertebral body and pedicle (series 602 images 60-61)  · Pain control: Oxy IR 2 5mg Q6 PRN moderate pain; oxy IR 5 mg q6 PRN severe pain; dilaudid 0 2mg IV Q4 PRN breakthrough; scheduled tylenol; continue home gabapentin dose of 100mg PO QHS  · PT/OT eval-- STR recommended and is pending   · CM following      Closed T12 fracture (HCC)   Assessment & Plan    · Noted yesterday after sustaining fall at [facility]  Was in this ED for same  D/C'ed back to [facility] assisted living with pain control, stool softeners, and TLSO brace  Pain is improved  · CT results: 1  Widening of the T12-L1 disc space consistent with traumatic distraction  Acute fracture of the L1 vertebral body superior endplate which appears superimposed on a chronic fracture deformity  Small amount of paravertebral hematoma at T12/L1  2  Nondisplaced fracture right T12 pedicle (series 602 image 44)    On sagittal imaging there also appears to be a fracture of the left posterior inferior T12 vertebral body corner with vertical extension along the junction of the vertebral body and pedicle (series 602 images 60-61)  · Pain control: Oxy IR 2 5mg Q6 PRN moderate pain; oxy IR 5 mg q6 PRN severe pain; dilaudid 0 2mg IV Q4 PRN breakthrough; scheduled tylenol; continue home gabapentin dose of 100mg PO QHS  · PT/OT eval-- STR recommended and is pending   · CM following     Atrial fibrillation (HCC)   Assessment & Plan    · Still in rate controlled a fib today  · Not on anticoagulation 2/2 fall risk      Essential hypertension   Assessment & Plan    · BP elevated, suspect related to pain  · Continue pain control  · Continue home BP medications  · Hydralazine 10mg IV Q6 PRN SBP >180     Other hyperlipidemia   Assessment & Plan    · Patient is not currently on a statin     Ambulatory dysfunction   Assessment & Plan    · Reported increased pain and difficultly with transferring at [facility]  Most likely related to recent fall with fx  · At baseline, patient typically requires assistance for transfer from recliner to wheelchair, then self-propels  Usually 1 time per day he ambulates from his room to the dining chavez with a walker and assistance of aides  · PT/OT eval  · CM following  · Anticipate STR prior to return to previous level of care -- will need 3 midnights stay and will satisfy tomorrow       VTE Pharmacologic Prophylaxis:   Pharmacologic: Heparin  Mechanical VTE Prophylaxis in Place: Yes    Patient Centered Rounds: I have performed bedside rounds with nursing staff today  Discussions with Specialists or Other Care Team Provider: Discussed with RN, PETE    Education and Discussions with Family / Patient: Discussed with patient, called daughter and left message    Time Spent for Care: 30 minutes  More than 50% of total time spent on counseling and coordination of care as described above      Current Length of Stay: 2 day(s)    Current Patient Status: Inpatient Certification Statement: The patient will continue to require additional inpatient hospital stay due to pending safe discharge plan     Discharge Plan: Likely tomorrow     Code Status: Level 1 - Full Code      Subjective:   Patient reports that he is doing ok today  Denies back pain  Reports tolerating brace well  Denies numbness, tingling, or difficulties going to the bathroom  Objective:     Vitals:   Temp (24hrs), Av 4 °F (36 9 °C), Min:98 3 °F (36 8 °C), Max:98 5 °F (36 9 °C)    Temp:  [98 3 °F (36 8 °C)-98 5 °F (36 9 °C)] 98 3 °F (36 8 °C)  HR:  [57-71] 57  Resp:  [20] 20  BP: (115-170)/(66-99) 170/91  SpO2:  [96 %-97 %] 97 %  Body mass index is 29 16 kg/m²  Input and Output Summary (last 24 hours): Intake/Output Summary (Last 24 hours) at 18 1033  Last data filed at 18 7956   Gross per 24 hour   Intake              240 ml   Output              420 ml   Net             -180 ml       Physical Exam:     Physical Exam   Constitutional: He is oriented to person, place, and time  Vital signs are normal  He appears well-developed and well-nourished  Non-toxic appearance  No distress  HENT:   Head: Normocephalic and atraumatic  Mouth/Throat: Abnormal dentition  Eyes: Pupils are equal, round, and reactive to light  Conjunctivae and EOM are normal    Neck: Neck supple  Cardiovascular: Normal rate, S1 normal and intact distal pulses  An irregularly irregular rhythm present  Exam reveals no S3 and no S4  Murmur heard  Pulmonary/Chest: Effort normal and breath sounds normal  No accessory muscle usage  No respiratory distress  He has no decreased breath sounds  He has no wheezes  He has no rhonchi  He has no rales  He exhibits no tenderness  Abdominal: Soft  Bowel sounds are normal  He exhibits no distension and no mass  There is no tenderness  There is no rigidity, no rebound and no guarding     Musculoskeletal:   TLSO brace present    Neurological: He is alert and oriented to person, place, and time  He is not disoriented  Skin: Skin is warm and dry  Additional Data:     Labs:      Results from last 7 days  Lab Units 11/08/18  0525 11/07/18  1613   WBC Thousand/uL 7 14 7 51   HEMOGLOBIN g/dL 12 5 13 0   HEMATOCRIT % 37 9 39 1   PLATELETS Thousands/uL 105* 106*   NEUTROS PCT %  --  76*   LYMPHS PCT %  --  13*   MONOS PCT %  --  10   EOS PCT %  --  1       Results from last 7 days  Lab Units 11/08/18  0526   POTASSIUM mmol/L 3 6   CHLORIDE mmol/L 109*   CO2 mmol/L 26   BUN mg/dL 22   CREATININE mg/dL 1 30   CALCIUM mg/dL 8 2*           * I Have Reviewed All Lab Data Listed Above  * Additional Pertinent Lab Tests Reviewed:  Patricia Villarreal Admission Reviewed    Imaging:    Imaging Reports Reviewed Today Include: None  Imaging Personally Reviewed by Myself Includes:  None    Recent Cultures (last 7 days):           Last 24 Hours Medication List:     Current Facility-Administered Medications:  acetaminophen 975 mg Oral Q8H [address] JESSICA Magdaleno-C   aspirin 81 mg Oral Daily Deborah Olivera, PA-C   benzonatate 100 mg Oral Q6H PRN Deborah Olivera, PA-C   cholecalciferol 2,000 Units Oral Daily Deborah Olivera, PA-C   cyanocobalamin 1,000 mcg Oral Daily Deborah Olivera, PA-C   docusate sodium 100 mg Oral BID Deborah Olivera PA-C   finasteride 5 mg Oral Daily Deborah Olivera, PA-C   gabapentin 100 mg Oral HS Deborah Olivera PA-C   heparin (porcine) 5,000 Units Subcutaneous Q8H [address] Deborah Olivera PA-C   hydrALAZINE 10 mg Intravenous Q6H PRN JESSICA Magdaleno-C   HYDROmorphone 0 2 mg Intravenous Q4H PRN Deborah Olivera, PA-C   loratadine 10 mg Oral Daily PRN Deborah Olivera, PA-C   morphine injection 2 mg Intravenous Once PRN Rhona Bright DO   nystatin 1 application Topical BID Deborah Olivera PA-C   oxyCODONE 2 5 mg Oral Q4H PRN Deborah Olivera PA-C   oxyCODONE 5 mg Oral Q4H PRN Deborah Olivera, BEAN Deonte 1 application Topical BID Deborah Olivera PA-C   sodium chloride 2 spray Each Nare PRN Deborah Olivera PA-C   tamsulosin 0 4 mg Oral Daily Deborah Olivera PA-C        Today, Patient Was Seen By: Joy Barry PA-C    ** Please Note: Dictation voice to text software may have been used in the creation of this document   **

## 2018-11-09 NOTE — SOCIAL WORK
CM spoke with daughter and patient about transport via [address] [place] [address] and are agreeable to transfer from [address] [place] [address] to [facility] tmw  CM obtained 10am transport and IMM was reviewed with daughter at bedside  CM will follow through discharge

## 2018-11-10 VITALS
RESPIRATION RATE: 18 BRPM | BODY MASS INDEX: 29.16 KG/M2 | SYSTOLIC BLOOD PRESSURE: 146 MMHG | TEMPERATURE: 97.3 F | WEIGHT: 191.8 LBS | DIASTOLIC BLOOD PRESSURE: 77 MMHG | OXYGEN SATURATION: 97 % | HEART RATE: 65 BPM

## 2018-11-10 PROCEDURE — 99239 HOSP IP/OBS DSCHRG MGMT >30: CPT | Performed by: INTERNAL MEDICINE

## 2018-11-10 RX ORDER — OXYCODONE HYDROCHLORIDE 5 MG/1
2.5 TABLET ORAL EVERY 4 HOURS PRN
Qty: 15 TABLET | Refills: 0 | Status: SHIPPED | OUTPATIENT
Start: 2018-11-10 | End: 2018-11-15

## 2018-11-10 RX ORDER — LIDOCAINE 4 G/G
1 PATCH TOPICAL EVERY 12 HOURS PRN
Qty: 15 PATCH | Refills: 0 | Status: SHIPPED | OUTPATIENT
Start: 2018-11-10 | End: 2019-04-11 | Stop reason: ALTCHOICE

## 2018-11-10 RX ADMIN — ACETAMINOPHEN 975 MG: 325 TABLET, FILM COATED ORAL at 05:49

## 2018-11-10 RX ADMIN — DOCUSATE SODIUM 100 MG: 100 CAPSULE, LIQUID FILLED ORAL at 08:10

## 2018-11-10 RX ADMIN — TAMSULOSIN HYDROCHLORIDE 0.4 MG: 0.4 CAPSULE ORAL at 08:10

## 2018-11-10 RX ADMIN — FINASTERIDE 5 MG: 5 TABLET, FILM COATED ORAL at 08:10

## 2018-11-10 RX ADMIN — NYSTATIN 1 APPLICATION: 100000 POWDER TOPICAL at 08:11

## 2018-11-10 RX ADMIN — HEPARIN SODIUM 5000 UNITS: 5000 INJECTION, SOLUTION INTRAVENOUS; SUBCUTANEOUS at 05:49

## 2018-11-10 RX ADMIN — ASPIRIN 81 MG: 81 TABLET, COATED ORAL at 08:10

## 2018-11-10 RX ADMIN — CYANOCOBALAMIN TAB 500 MCG 1000 MCG: 500 TAB at 08:10

## 2018-11-10 RX ADMIN — VITAMIN D, TAB 1000IU (100/BT) 2000 UNITS: 25 TAB at 08:10

## 2018-11-10 RX ADMIN — Medication 1 APPLICATION: at 08:11

## 2018-11-10 NOTE — PLAN OF CARE
MUSCULOSKELETAL - ADULT     Maintain or return mobility to safest level of function Not Progressing          DISCHARGE PLANNING     Discharge to home or other facility with appropriate resources Progressing        Nutrition/Hydration-ADULT     Nutrient/Hydration intake appropriate for improving, restoring or maintaining nutritional needs Progressing        PAIN - ADULT     Verbalizes/displays adequate comfort level or baseline comfort level Progressing        Potential for Falls     Patient will remain free of falls Progressing        Prexisting or High Potential for Compromised Skin Integrity     Skin integrity is maintained or improved Progressing        SKIN/TISSUE INTEGRITY - ADULT     Skin integrity remains intact Progressing     Incision(s), wounds(s) or drain site(s) healing without S/S of infection Progressing

## 2018-11-10 NOTE — DISCHARGE SUMMARY
Discharge Summary - [facility] Internal Medicine    Patient Information: Arina Ramos 80 y o  male MRN: [id number]  Unit/Bed#: -01 Encounter: [id number]    Discharging Physician / Practitioner: Juhi Goodson MD  PCP: Danie Gross  Admission Date: 11/7/2018  Discharge Date: 11/10/18    Reason for Admission:  Ambulatory dysfunction, L1 vertebral fracture    Discharge Diagnoses:     Principal Problem:    Closed L1 vertebral fracture ([facility] [address] )  Active Problems:    Atrial fibrillation ([address] )    Essential hypertension    Other hyperlipidemia    Ambulatory dysfunction    Closed T12 fracture ([facility] [address] )      Consultations During Hospital Stay:  · None    Procedures Performed:   · None    Significant findings:  · None    Hospital Course:   Arina Ramos is a 80 y o  male patient who originally presented to the hospital on 11/7/2018 due to back pain and ambulatory dysfunction  Patient was recently seen in emergency department following fall at his nursing facility  Imaging studies at that time had shown acute fracture of the L1 vertebral body superior endplate  He had been evaluated by the neurosurgical team and recommended for TLSO brace and discharged back to his nursing facility  Due to continued pain and increasing ambulatory dysfunction, he presented back to the hospital for further evaluation  He was seen by PT and recommended for rehab  Pain was controlled with Tylenol and oxycodone  He remained stable through his hospital course  He is recommended to wear the TLSO brace when out of bed or ambulating  He was discharged to rehab on 11/10/18  Condition at Discharge: stable     Discharge Day Visit / Exam:     Subjective:  No new complaints or acute overnight events  He denied back pain at the time of my evaluation this morning      Vitals: Blood Pressure: 146/77 (11/10/18 0749)  Pulse: 65 (11/10/18 0749)  Temperature: (!) 97 3 °F (36 3 °C) (11/10/18 0749)  Temp Source: Oral (11/10/18 0749)  Respirations: 18 (11/10/18 0749)  Weight - Scale: 87 kg (191 lb 12 8 oz) (11/07/18 1713)  SpO2: 97 % (11/10/18 0749)    General Appearance:    Alert, laying comfortably in bed, cooperative, no distress, appropriately responsive    Head:    Normocephalic, without obvious abnormality, atraumatic, mucous membranes moist, poor dentition    Eyes:    Conjunctiva/corneas clear, EOM's intact   Neck:   Supple   Lungs:     Clear to auscultation bilaterally, respirations unlabored, no crackles or wheeze     Heart:    Irregularly irregular, S1 and S2, +SM    Abdomen:     Soft, non-tender, nondistended   Extremities:   No edema   Neurologic:  Alert and oriented x3, moves all extremities      Discharge instructions/Information to patient and family:   See after visit summary for information provided to patient and family  Provisions for Follow-Up Care:  See after visit summary for information related to follow-up care and any pertinent home health orders  Disposition: Short-term rehab at [facility]      Discharge Statement:  I spent >30 minutes discharging the patient  This time was spent on the day of discharge  I had direct contact with the patient on the day of discharge  Greater than 50% of the total time was spent examining patient, answering all patient questions, arranging and discussing plan of care with patient as well as directly providing post-discharge instructions  Additional time then spent on discharge activities  Discharge Medications:  See after visit summary for reconciled discharge medications provided to patient and family  ** Please Note: Dragon 360 Dictation voice to text software may have been used in the creation of this document   **

## 2018-11-29 ENCOUNTER — TELEPHONE (OUTPATIENT)
Dept: NEUROSURGERY | Facility: CLINIC | Age: 83
End: 2018-11-29

## 2018-11-29 DIAGNOSIS — S22.080A T12 COMPRESSION FRACTURE (HCC): Primary | ICD-10-CM

## 2018-11-29 DIAGNOSIS — S32.010A CLOSED COMPRESSION FRACTURE OF FIRST LUMBAR VERTEBRA, INITIAL ENCOUNTER: ICD-10-CM

## 2018-12-17 ENCOUNTER — TRANSCRIBE ORDERS (OUTPATIENT)
Dept: ADMINISTRATIVE | Facility: HOSPITAL | Age: 83
End: 2018-12-17

## 2018-12-17 ENCOUNTER — HOSPITAL ENCOUNTER (OUTPATIENT)
Dept: RADIOLOGY | Facility: HOSPITAL | Age: 83
Discharge: HOME | End: 2018-12-17
Payer: MEDICARE

## 2018-12-17 DIAGNOSIS — R93.89 ABNORMAL CHEST X-RAY: Primary | ICD-10-CM

## 2018-12-17 DIAGNOSIS — S22.080A T12 COMPRESSION FRACTURE (HCC): ICD-10-CM

## 2018-12-17 DIAGNOSIS — S32.010A CLOSED COMPRESSION FRACTURE OF FIRST LUMBAR VERTEBRA, INITIAL ENCOUNTER: ICD-10-CM

## 2018-12-17 DIAGNOSIS — R93.89 ABNORMAL CHEST X-RAY: ICD-10-CM

## 2018-12-17 PROCEDURE — 71046 X-RAY EXAM CHEST 2 VIEWS: CPT

## 2018-12-17 PROCEDURE — 72080 X-RAY EXAM THORACOLMB 2/> VW: CPT

## 2018-12-18 ENCOUNTER — OFFICE VISIT (OUTPATIENT)
Dept: NEUROSURGERY | Facility: CLINIC | Age: 83
End: 2018-12-18
Payer: MEDICARE

## 2018-12-18 DIAGNOSIS — S32.010G CLOSED WEDGE COMPRESSION FRACTURE OF FIRST LUMBAR VERTEBRA WITH DELAYED HEALING, SUBSEQUENT ENCOUNTER: Primary | ICD-10-CM

## 2018-12-18 DIAGNOSIS — S22.080D CLOSED WEDGE COMPRESSION FRACTURE OF TWELFTH THORACIC VERTEBRA WITH ROUTINE HEALING, SUBSEQUENT ENCOUNTER: ICD-10-CM

## 2018-12-18 PROCEDURE — 99213 OFFICE O/P EST LOW 20 MIN: CPT | Performed by: PHYSICIAN ASSISTANT

## 2018-12-18 NOTE — PATIENT INSTRUCTIONS
Continue with TLSO brace at all times when out of bed  Continue with restrictions, lifting, pushing or pulling  no greater than 10 lb  Ambulation with assistance at all times  Further follow-up with Neurosurgery in approximately 1 month      Thoracolumbar spine study a few days prior to follow-up visit

## 2018-12-18 NOTE — LETTER
December 18, 2018     Yovanny Morin  [address] [facility] [address]    Patient: Constantin Hernandez   YOB: 1929   Date of Visit: 12/18/2018       Dear Dr Elmer Gonsales: Thank you for referring Zoë Potter to me for evaluation  Below are my notes for this consultation  If you have questions, please do not hesitate to call me  I look forward to following your patient along with you  Sincerely,        Alea Piper PA-C        CC: [address]  12/18/2018 10:35 AM  Sign at close encounter  Assessment/Plan:    Very pleasant 75-year-old male, arrives by wheelchair today, accompanied by his daughter, has history of a fall with T12 and L1 fractures  Approximately 6 weeks post fall  Returns for hospital follow-up  He has a TLSO brace in place appropriately applied, and when questioned reports he wears it at all times other than bed  His daughter reports he has had multiple falls in the past, and he does understand to always asked for assistance however he will at times is failed to do this as in this case which resulted in a fall  He reports he has no back pain  Regardless of position or activity  He has had updated thoracolumbar spine study 12/17/18  Studies are reviewed in detail by Dr Sampson Kimball, compared with prior studies, thoracolumbar spine 11/6/18, and CT lumbar spine 12/6/18  Overall alignment remains unchanged  Dr Sampson Kimball comments that the T12 fracture is a 3 column fracture and as such is inherently unstable  In light of the fact that the patient is pain-free, and has multiple comorbid conditions, which include coagulopathy, renal insufficiency, atrial fibrillation and chronic anemia, continued bracing is advised, as opposed to surgical stabilization       In addition any ambulation should always be with a walker and with assistance  He is again cautioned to be to particularly careful with his activities so as to avoid fall risks      On examination there is no pain with palpation and or percussion of the thoracolumbar spine  Motor examination lower extremities is 5 x 5 for power reflexes are intact sensation is intact  He denies bowel or bladder incontinence, motor or sensory difficulties in the lower extremities, he does continue to ambulate with a 4 point walker and assistance  Further follow-up is planned in approximately 4 weeks with repeat thoracolumbar spine study a few days prior to visit  Continue ambulation as discussed, lifting pushing pulling no greater than 10 lb  Avoid bending  These findings, impressions and recommendations are reviewed in great detail with the patient and his daughter, they expressed understanding and agreement, their questions were answered completely and to their satisfaction  Follow up has been scheduled  Diagnoses and all orders for this visit:    Closed wedge compression fracture of first lumbar vertebra with delayed healing, subsequent encounter  -     X-ray thoracolumbar spine 2 views; Future    Closed wedge compression fracture of twelfth thoracic vertebra with routine healing, subsequent encounter  -     X-ray thoracolumbar spine 2 views; Future          Return in about 4 weeks (around 1/15/2019) for Review thoracolumbar spine study  Subjective:      Patient ID: Marko Garcia is a 80 y o  male  HPI    The following portions of the patient's history were reviewed and updated as appropriate: allergies, current medications, past family history, past medical history, past social history and past surgical history  Review of Systems   Constitutional: Negative  HENT: Negative  Eyes: Negative  Respiratory: Negative  Cardiovascular: Negative  Gastrointestinal: Negative  Endocrine: Negative  Genitourinary: Negative  Musculoskeletal: Negative  Allergic/Immunologic: Negative  Neurological: Negative  Hematological: Negative      Psychiatric/Behavioral: Negative  Objective:    Physical Exam   Constitutional: He is oriented to person, place, and time  He appears well-developed  Eyes: Pupils are equal, round, and reactive to light  EOM are normal    Cardiovascular: Normal rate  Grade 2/6 systolic ejection murmur heard best at the left sternal border  Neurological: He is alert and oriented to person, place, and time  Skin: Skin is warm and dry  Neurologic Exam     Mental Status   Oriented to person, place, and time  Cranial Nerves     CN III, IV, VI   Pupils are equal, round, and reactive to light  Extraocular motions are normal             THORACOLUMBAR SPINE   12/17/18     INDICATION:   S22 080A: Wedge compression fracture of t11-T12 vertebra, initial encounter for closed fracture  S32 010A: Wedge compression fracture of first lumbar vertebra, initial encounter for closed fracture      COMPARISON: Spine radiographs 11/6/2018     VIEWS:  XR SPINE THORACOLUMBAR 2 VW        FINDINGS:  The patient is osteopenic which limits evaluation of the osseous structures      T12-L1 disc space is unchanged, which does not appear widened on the current study  L1 superior endplate fracture is not well identified on the current study due to osteopenia      Thoracolumbar S-shaped scoliosis is unchanged  Grade 1 anterolisthesis L4 on 5 is unchanged      No significant degenerative changes       There is no displacement of the paraspinal line       The pedicles appear intact      IMPRESSION:  Limited exam as above

## 2018-12-18 NOTE — PROGRESS NOTES
Assessment/Plan:    Very pleasant 59-year-old male, arrives by wheelchair today, accompanied by his daughter, has history of a fall with T12 and L1 fractures  Approximately 6 weeks post fall  Returns for hospital follow-up  He has a TLSO brace in place appropriately applied, and when questioned reports he wears it at all times other than bed  His daughter reports he has had multiple falls in the past, and he does understand to always asked for assistance however he will at times is failed to do this as in this case which resulted in a fall  He reports he has no back pain  Regardless of position or activity  He has had updated thoracolumbar spine study 12/17/18  Studies are reviewed in detail by Dr Larissa Isaac, compared with prior studies, thoracolumbar spine 11/6/18, and CT lumbar spine 12/6/18  Overall alignment remains unchanged  Dr Larissa Isaac comments that the T12 fracture is a 3 column fracture and as such is inherently unstable  In light of the fact that the patient is pain-free, and has multiple comorbid conditions, which include coagulopathy, renal insufficiency, atrial fibrillation and chronic anemia, continued bracing is advised, as opposed to surgical stabilization       In addition any ambulation should always be with a walker and with assistance  He is again cautioned to be to particularly careful with his activities so as to avoid fall risks  On examination there is no pain with palpation and or percussion of the thoracolumbar spine  Motor examination lower extremities is 5 x 5 for power reflexes are intact sensation is intact  He denies bowel or bladder incontinence, motor or sensory difficulties in the lower extremities, he does continue to ambulate with a 4 point walker and assistance  Further follow-up is planned in approximately 4 weeks with repeat thoracolumbar spine study a few days prior to visit      Continue ambulation as discussed, lifting pushing pulling no greater than 10 lb  Avoid bending  These findings, impressions and recommendations are reviewed in great detail with the patient and his daughter, they expressed understanding and agreement, their questions were answered completely and to their satisfaction  Follow up has been scheduled  Diagnoses and all orders for this visit:    Closed wedge compression fracture of first lumbar vertebra with delayed healing, subsequent encounter  -     X-ray thoracolumbar spine 2 views; Future    Closed wedge compression fracture of twelfth thoracic vertebra with routine healing, subsequent encounter  -     X-ray thoracolumbar spine 2 views; Future          Return in about 4 weeks (around 1/15/2019) for Review thoracolumbar spine study  Subjective:      Patient ID: Tyseha Jones is a 80 y o  male  HPI    The following portions of the patient's history were reviewed and updated as appropriate: allergies, current medications, past family history, past medical history, past social history and past surgical history  Review of Systems   Constitutional: Negative  HENT: Negative  Eyes: Negative  Respiratory: Negative  Cardiovascular: Negative  Gastrointestinal: Negative  Endocrine: Negative  Genitourinary: Negative  Musculoskeletal: Negative  Allergic/Immunologic: Negative  Neurological: Negative  Hematological: Negative  Psychiatric/Behavioral: Negative  Objective:    Physical Exam   Constitutional: He is oriented to person, place, and time  He appears well-developed  Eyes: Pupils are equal, round, and reactive to light  EOM are normal    Cardiovascular: Normal rate  Grade 2/6 systolic ejection murmur heard best at the left sternal border  Neurological: He is alert and oriented to person, place, and time  Skin: Skin is warm and dry  Neurologic Exam     Mental Status   Oriented to person, place, and time       Cranial Nerves     CN III, IV, VI   Pupils are equal, round, and reactive to light  Extraocular motions are normal             THORACOLUMBAR SPINE   12/17/18     INDICATION:   S22 080A: Wedge compression fracture of t11-T12 vertebra, initial encounter for closed fracture  S32 010A: Wedge compression fracture of first lumbar vertebra, initial encounter for closed fracture      COMPARISON: Spine radiographs 11/6/2018     VIEWS:  XR SPINE THORACOLUMBAR 2 VW        FINDINGS:  The patient is osteopenic which limits evaluation of the osseous structures      T12-L1 disc space is unchanged, which does not appear widened on the current study  L1 superior endplate fracture is not well identified on the current study due to osteopenia      Thoracolumbar S-shaped scoliosis is unchanged  Grade 1 anterolisthesis L4 on 5 is unchanged      No significant degenerative changes       There is no displacement of the paraspinal line       The pedicles appear intact      IMPRESSION:  Limited exam as above

## 2019-01-14 ENCOUNTER — HOSPITAL ENCOUNTER (OUTPATIENT)
Dept: RADIOLOGY | Facility: HOSPITAL | Age: 84
Discharge: HOME | End: 2019-01-14
Payer: MEDICARE

## 2019-01-14 DIAGNOSIS — S32.010G CLOSED WEDGE COMPRESSION FRACTURE OF FIRST LUMBAR VERTEBRA WITH DELAYED HEALING, SUBSEQUENT ENCOUNTER: ICD-10-CM

## 2019-01-14 DIAGNOSIS — S22.080D CLOSED WEDGE COMPRESSION FRACTURE OF TWELFTH THORACIC VERTEBRA WITH ROUTINE HEALING, SUBSEQUENT ENCOUNTER: ICD-10-CM

## 2019-01-14 PROCEDURE — 72080 X-RAY EXAM THORACOLMB 2/> VW: CPT

## 2019-01-16 ENCOUNTER — OFFICE VISIT (OUTPATIENT)
Dept: NEUROSURGERY | Facility: CLINIC | Age: 84
End: 2019-01-16
Payer: MEDICARE

## 2019-01-16 ENCOUNTER — TELEPHONE (OUTPATIENT)
Dept: NEUROSURGERY | Facility: CLINIC | Age: 84
End: 2019-01-16

## 2019-01-16 VITALS
SYSTOLIC BLOOD PRESSURE: 134 MMHG | WEIGHT: 187 LBS | RESPIRATION RATE: 16 BRPM | HEART RATE: 83 BPM | BODY MASS INDEX: 28.34 KG/M2 | TEMPERATURE: 97.6 F | DIASTOLIC BLOOD PRESSURE: 71 MMHG | HEIGHT: 68 IN

## 2019-01-16 DIAGNOSIS — S22.080D CLOSED WEDGE COMPRESSION FRACTURE OF TWELFTH THORACIC VERTEBRA WITH ROUTINE HEALING, SUBSEQUENT ENCOUNTER: ICD-10-CM

## 2019-01-16 DIAGNOSIS — S32.010D CLOSED WEDGE COMPRESSION FRACTURE OF FIRST LUMBAR VERTEBRA WITH ROUTINE HEALING, SUBSEQUENT ENCOUNTER: Primary | ICD-10-CM

## 2019-01-16 PROCEDURE — 99213 OFFICE O/P EST LOW 20 MIN: CPT | Performed by: PHYSICIAN ASSISTANT

## 2019-01-16 RX ORDER — PSEUDOEPHEDRINE HCL 30 MG
4 TABLET ORAL DAILY
COMMUNITY
End: 2020-06-10 | Stop reason: HOSPADM

## 2019-01-16 RX ORDER — PREDNISONE 20 MG/1
20 TABLET ORAL
COMMUNITY
End: 2019-02-22

## 2019-01-16 NOTE — TELEPHONE ENCOUNTER
Parker Huang, PAC asked me to call [facility] and ask for their fax to fax our office note to them

## 2019-01-16 NOTE — PATIENT INSTRUCTIONS
Gradually increase activities as tolerated  Ambulation with assistance/supervision is encouraged  Course of physical therapy for gait, balance and core strengthening is advised  Further follow-up with Neurosurgery on an as-needed basis  Return with any new back pain or change in character of current discomfort

## 2019-01-16 NOTE — LETTER
January 16, 2019     [address] [address]    Patient: Shivani Mosqueda   YOB: 1929   Date of Visit: 1/16/2019       Dear Dr Heena Corbin: Thank you for referring Jessee Conte to me for evaluation  Below are my notes for this consultation  If you have questions, please do not hesitate to call me  I look forward to following your patient along with you  Sincerely,        Caroline Pedersen PA-C        CC: [address]  1/16/2019  2:46 PM  Sign at close encounter  Assessment/Plan:    Very pleasant 80-year-old male accompanied by his daughter, returns for one-month follow-up, history of a fall approximately 10 weeks ago, with L1 and T12 compression fracture  He arrives by wheelchair, today without a TLSO brace in place, and when questioned his he and his daughter reports he only used is brace for about 1 week after his last visit  He reports he only has very occasional low back pain principally upon arising in the morning certain movements also cause some pain, but he is unsure and/or does not recall which, and is unable to duplicate that today  He has had updated x-ray studies of his thoracolumbar spine as requested 1/14/19  These reviewed reveal overall stability of the thoracolumbar spine, they were compared with prior studies, 11/6/18  On examination today he has no pain with palpation or percussion over the thoracolumbar spine, and he denies pain with flexion or extension  Motor examination lower extremities were 5 x 5 for power sensation is grossly intact  As he has already wean from his TLSO brace he may gradually return to all usual activities  Further follow-up with Neurosurgery will be on an as-needed basis  A course of physical therapy for gait, balance and core strengthening is advised and a request has been sent      These findings, impressions and recommendations are reviewed in great detail with the patient and his daughter, they expressed understanding and agreement, their questions were answered completely and to their satisfaction           Diagnoses and all orders for this visit:    Closed wedge compression fracture of first lumbar vertebra with routine healing, subsequent encounter  -     Ambulatory referral to Physical Therapy; Future    Closed wedge compression fracture of twelfth thoracic vertebra with routine healing, subsequent encounter  -     Ambulatory referral to Physical Therapy; Future    Other orders  -     Calcium Citrate 250 MG TABS; Take 4 tablets by mouth daily  -     SB POLYETHYLENE GLYCOL 3350 PO; Take by mouth as needed  -     predniSONE 20 mg tablet; Take 20 mg by mouth Daily and every other day as needed  -     ALBUTEROL SULFATE HFA IN; Inhale 2 5 mg every 6 (six) hours as needed          No Follow-up on file  Subjective:      Patient ID: Luann Faith is a 80 y o  male  HPI    The following portions of the patient's history were reviewed and updated as appropriate: allergies, current medications, past family history, past medical history, past social history and past surgical history  Review of Systems   Constitutional: Positive for activity change  HENT: Negative  Eyes: Negative  Respiratory: Negative  Cardiovascular: Negative  Gastrointestinal: Negative  Endocrine: Negative  Genitourinary: Negative  Musculoskeletal: Positive for gait problem  Skin: Negative  Allergic/Immunologic: Negative  Neurological: Positive for weakness  Hematological: Negative  Psychiatric/Behavioral: Negative  All other systems reviewed and are negative  Objective:    Physical Exam   Constitutional: He appears well-developed  HENT:   Head: Normocephalic  Eyes: Pupils are equal, round, and reactive to light  EOM are normal    Cardiovascular: Normal rate and regular rhythm  Pulmonary/Chest: Effort normal and breath sounds normal    Skin: Skin is warm and dry  Psychiatric: He has a normal mood and affect  Neurologic Exam     Cranial Nerves     CN III, IV, VI   Pupils are equal, round, and reactive to light  Extraocular motions are normal           THORACOLUMBAR SPINE   1/14/19     INDICATION:   S32 010G: Wedge compression fracture of first lumbar vertebra, subsequent encounter for fracture with delayed healing  S22 080D: Wedge compression fracture of t11-T12 vertebra, subsequent encounter for fracture with routine healing      COMPARISON: None     VIEWS:  XR SPINE THORACOLUMBAR 2 VW     ADDITIONAL TECH NOTES:"pt came w/out a brace & stood w/assistance for as long as possible for imaging*  best images possible"        FINDINGS:     Very limited visualization of the lumbar spine on the lateral images secondary to patient's body habitus and osteopenia      The known T12 and L1 fractures are not well visualized      Stable scoliosis  Stable advanced diffuse endplate and facet degenerative changes      No significant degenerative changes       There is no displacement of the paraspinal line       The pedicles appear intact      IMPRESSION:     Very limited visualization of the lumbar spine on the lateral images secondary to patient's body habitus and osteopenia      The known T12 and L1 fractures are not well visualized

## 2019-01-28 NOTE — ASSESSMENT & PLAN NOTE
· BP elevated, suspect related to pain  · Continue pain control  · Continue home BP medications  · Hydralazine 10mg IV Q6 PRN SBP >180 Principal Discharge DX:	Vertigo  Secondary Diagnosis:	Pseudoaneurysm of carotid artery  Secondary Diagnosis:	HTN (hypertension)  Goal:	To maintain a normal blood pressure to prevent heart attack, stroke and renal failure.  Secondary Diagnosis:	Asthma  Goal:	Prevent exacerbations and hospitalizations Principal Discharge DX:	Vertigo  Secondary Diagnosis:	Pseudoaneurysm of carotid artery  Goal:	Appropriate follow up and prevent worsening of aneurysm  Assessment and plan of treatment:	Neurosurgery recommend outpatient follow up with Dr. Hagan for possible serial imaging of the pseudoaneurysm. Findings likely represent the presence of pseudoaneurysm in the setting of a healed arterial dissection.  Secondary Diagnosis:	HTN (hypertension)  Goal:	To maintain a normal blood pressure to prevent heart attack, stroke and renal failure.  Assessment and plan of treatment:	Low sodium and fat diet, continue anti-hypertensive medications, and follow up with primary care physician.  Secondary Diagnosis:	Asthma  Goal:	Prevent exacerbations and hospitalizations  Assessment and plan of treatment:	Continue with home asthma medications as prescribed. Principal Discharge DX:	Peripheral vertigo  Secondary Diagnosis:	Pseudoaneurysm of carotid artery  Goal:	Appropriate follow up and prevent worsening of aneurysm  Assessment and plan of treatment:	Neurosurgery recommend outpatient follow up with Dr. Hagan for possible serial imaging of the pseudoaneurysm. Findings likely represent the presence of pseudoaneurysm in the setting of a healed arterial dissection.  Secondary Diagnosis:	HTN (hypertension)  Goal:	To maintain a normal blood pressure to prevent heart attack, stroke and renal failure.  Assessment and plan of treatment:	Low sodium and fat diet, continue anti-hypertensive medications, and follow up with primary care physician.  Secondary Diagnosis:	Asthma  Goal:	Prevent exacerbations and hospitalizations  Assessment and plan of treatment:	Continue with home asthma medications as prescribed. Principal Discharge DX:	TIA (transient ischemic attack)  Goal:	To help recover or improve as much as possible your sensory and motor abilities, to become more independent, and prevent future strokes.  Assessment and plan of treatment:	-Follow up with Neurology appointment on 2/1/19 at 9:30 AM  -No evidence of stroke on CT scan or MRI   -Please take ASPIRIN DAILY AND LIPITOR 40mg DAILY   -LDL 76  Secondary Diagnosis:	Pseudoaneurysm of carotid artery  Goal:	Appropriate follow up and prevent worsening of aneurysm  Assessment and plan of treatment:	Neurosurgery recommend outpatient follow up with Dr. Hagan for possible serial imaging of the pseudoaneurysm. Findings likely represent the presence of pseudoaneurysm in the setting of a healed arterial dissection.  Secondary Diagnosis:	Asthma  Goal:	Prevent exacerbations and hospitalizations  Assessment and plan of treatment:	Continue with home asthma medications as prescribed. Principal Discharge DX:	TIA (transient ischemic attack)  Goal:	To help recover or improve as much as possible your sensory and motor abilities, to become more independent, and prevent future strokes.  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 LE dopplers negative for DVT. Follow up with neurology.  Secondary Diagnosis:	Pseudoaneurysm of carotid artery  Goal:	Appropriate follow up and prevent worsening of aneurysm  Assessment and plan of treatment:	Neurosurgery recommend outpatient follow up with Dr. Hagan for possible serial imaging of the pseudoaneurysm. Findings likely represent the presence of pseudoaneurysm in the setting of a healed arterial dissection.  Secondary Diagnosis:	Asthma  Goal:	Prevent exacerbations and hospitalizations  Assessment and plan of treatment:	Continue with home asthma medications as prescribed.

## 2019-02-20 ENCOUNTER — TELEPHONE (OUTPATIENT)
Dept: NEUROSURGERY | Facility: CLINIC | Age: 84
End: 2019-02-20

## 2019-02-20 NOTE — TELEPHONE ENCOUNTER
[employer] staff had questions about Mr  Lyle and activity level  Also had questions about his TLSO brace  I recent my consultation letter of 1/16/19  I clarified the patient no longer needs his TLSO brace  In addition he is encouraged to go for therapy as tolerated  Nursing staff, Ms Landin, RN, expressed understanding

## 2019-02-21 ENCOUNTER — TRANSCRIBE ORDERS (OUTPATIENT)
Dept: ADMINISTRATIVE | Facility: HOSPITAL | Age: 84
End: 2019-02-21

## 2019-02-21 DIAGNOSIS — I73.9 PAD (PERIPHERAL ARTERY DISEASE) (HCC): Primary | ICD-10-CM

## 2019-02-21 DIAGNOSIS — I82.493 DEEP VEIN THROMBOSIS (DVT) OF OTHER VEIN OF BOTH LOWER EXTREMITIES, UNSPECIFIED CHRONICITY (HCC): ICD-10-CM

## 2019-02-22 ENCOUNTER — APPOINTMENT (EMERGENCY)
Dept: RADIOLOGY | Facility: HOSPITAL | Age: 84
End: 2019-02-22
Payer: MEDICARE

## 2019-02-22 ENCOUNTER — HOSPITAL ENCOUNTER (EMERGENCY)
Facility: HOSPITAL | Age: 84
Discharge: HOME | End: 2019-02-22
Attending: EMERGENCY MEDICINE | Admitting: EMERGENCY MEDICINE
Payer: MEDICARE

## 2019-02-22 VITALS
WEIGHT: 187 LBS | OXYGEN SATURATION: 98 % | SYSTOLIC BLOOD PRESSURE: 134 MMHG | HEART RATE: 74 BPM | RESPIRATION RATE: 20 BRPM | DIASTOLIC BLOOD PRESSURE: 84 MMHG | BODY MASS INDEX: 28.43 KG/M2 | TEMPERATURE: 98.5 F

## 2019-02-22 DIAGNOSIS — S91.101A OPEN WOUND OF RIGHT GREAT TOE, INITIAL ENCOUNTER: Primary | ICD-10-CM

## 2019-02-22 DIAGNOSIS — L03.115 MRSA CELLULITIS OF RIGHT FOOT: ICD-10-CM

## 2019-02-22 DIAGNOSIS — B95.62 MRSA CELLULITIS OF RIGHT FOOT: ICD-10-CM

## 2019-02-22 PROCEDURE — 87147 CULTURE TYPE IMMUNOLOGIC: CPT | Performed by: STUDENT IN AN ORGANIZED HEALTH CARE EDUCATION/TRAINING PROGRAM

## 2019-02-22 PROCEDURE — 87205 SMEAR GRAM STAIN: CPT | Performed by: STUDENT IN AN ORGANIZED HEALTH CARE EDUCATION/TRAINING PROGRAM

## 2019-02-22 PROCEDURE — 87075 CULTR BACTERIA EXCEPT BLOOD: CPT | Performed by: STUDENT IN AN ORGANIZED HEALTH CARE EDUCATION/TRAINING PROGRAM

## 2019-02-22 PROCEDURE — 99283 EMERGENCY DEPT VISIT LOW MDM: CPT

## 2019-02-22 PROCEDURE — 87186 SC STD MICRODIL/AGAR DIL: CPT | Performed by: STUDENT IN AN ORGANIZED HEALTH CARE EDUCATION/TRAINING PROGRAM

## 2019-02-22 PROCEDURE — 87070 CULTURE OTHR SPECIMN AEROBIC: CPT | Performed by: STUDENT IN AN ORGANIZED HEALTH CARE EDUCATION/TRAINING PROGRAM

## 2019-02-22 PROCEDURE — 73620 X-RAY EXAM OF FOOT: CPT

## 2019-02-22 NOTE — DISCHARGE INSTRUCTIONS
Patient was evaluated by Podiatry today in the emergency department  Inpatient admission was recommended, but patient and daughter electing to continue IV antibiotics and follow up with Podiatry as outpatient  Continue Lovenox for DVT

## 2019-02-22 NOTE — ED PROVIDER NOTES
History  Chief Complaint   Patient presents with    Wound Infection     pt brought from [facility] for infection in left foot ulcer  also had recent vascular study with possible DVTs  denies fevers     HPI   Patient is a 51-year-old gentleman who presents to the emergency department for a wound infection of the right hallux  The patient is a resident of [facility]  He was recently diagnosed with a wound on his right great toe that was swabbed and tested positive for MRSA  A PICC line was placed and he was started on IV vancomycin  According to daughter she believes that he has only received 1 dose of this antibiotic  Patient was sent to the emergency department today because he now has some erythema covering the distal dorsal aspect of his right foot  He was also 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 Taking? ALBUTEROL SULFATE HFA IN  Outside Facility (Specify) Yes Yes   Sig: Inhale 2 5 mg every 6 (six) hours as needed   Calcium Citrate 250 MG TABS  Outside Facility (Specify) Yes Yes   Sig: Take 4 tablets by mouth daily   Cholecalciferol (VITAMIN D3) [address] (Specify) Yes Yes   Sig: Take 2,000 Units by mouth daily     Lidocaine 4 % PTCH  Outside Facility (Specify) No Yes   Sig: Apply 1 patch topically every 12 (twelve) hours as needed (pain)   SB POLYETHYLENE GLYCOL 3350 PO  Outside Facility (Specify) Yes Yes   Sig: Take by mouth as needed   Tamsulosin HCl (FLOMAX PO)  Outside Facility (Specify) Yes Yes   Sig: Take 0 4 mg by mouth daily     acetaminophen (TYLENOL) 325 mg tablet  Outside Facility (Specify) No Yes   Sig: Take 2 tablets by mouth every 6 (six) hours as needed for mild pain   Patient taking differently: Take 650 mg by mouth 3 (three) times a day     aspirin (ECOTRIN LOW STRENGTH) 81 mg EC tablet  Outside Facility (Specify) Yes Yes   Sig: Take 81 mg by mouth daily   benzonatate (TESSALON PERLES) 100 mg capsule  Outside Facility (Specify) Yes Yes   Sig: Take 100 mg by mouth every 6 (six) hours as needed for cough   cyanocobalamin (VITAMIN B-12) 1,000 mcg tablet  Outside Facility (Specify) Yes Yes   Sig: Take 1,000 mcg by mouth daily  docusate sodium (COLACE) 100 mg capsule  Outside Facility (Specify) No Yes   Sig: Take 1 capsule (100 mg total) by mouth 2 (two) times a day Prn constipation   enoxaparin (LOVENOX) 80 mg/0 8 mL   Yes Yes   Sig: Inject 80 mg under the skin 2 (two) times a day   finasteride (PROSCAR) 5 mg tablet  Outside Facility (Specify) Yes Yes   Sig: Take 5 mg by mouth daily  gabapentin (NEURONTIN) 100 mg capsule  Outside Facility (Specify) No Yes   Sig: Take 1 capsule by mouth daily at bedtime   sodium chloride (OCEAN) 0 65 % nasal spray  Outside Facility (Specify) Yes Yes   Si sprays into each nostril 3 (three) times a day     vancomycin (VANCOCIN)   Yes Yes   Sig: Infuse 1,000 mg into a venous catheter daily In 250 ml Normal Saline daily at 0000      Facility-Administered Medications: None       Past Medical History:   Diagnosis Date    Actinic keratosis     Anemia     chronic hemolytic anemia    Atrial fibrillation (HCC)     Benign prostatic hypertrophy     Blood in urine     Cellulitis     Dermatitis     Disease of thyroid gland     Dyslipidemia     Epidural hematoma (HCC)     Epistaxis     Falling     Folate deficiency     GERD (gastroesophageal reflux disease)     Hip fracture (HCC)     Hyperlipidemia     Hypertension     Hypothyroidism (acquired)     Insomnia     Neuropathy     Nocturia     Osteoarthritis     Psychiatric disorder     depression    Renal disorder     Acute renal failure    Sepsis (HCC)     Shoulder pain, right     Squamous cell carcinoma     Subdural hematoma (HCC)     Thrombophlebitis     Urinary retention        Past Surgical History:   Procedure Laterality Date    HIP SURGERY         History reviewed  No pertinent family history  I have reviewed and agree with the history as documented  Social History     Tobacco Use    Smoking status: Former Smoker     Last attempt to quit: 1976     Years since quittin 0    Smokeless tobacco: Former User   Substance Use Topics    Alcohol use: No     Comment: once a week    Drug use: No        Review of Systems   Constitutional: Negative for chills, diaphoresis, fatigue and fever  HENT: Negative for hearing loss, nosebleeds, sinus pain and sore throat  Eyes: Negative for photophobia, pain, redness and visual disturbance  Respiratory: Negative for cough, chest tightness, shortness of breath, wheezing and stridor  Cardiovascular: Negative for chest pain, palpitations and leg swelling  Gastrointestinal: Negative for abdominal distention, abdominal pain, constipation, diarrhea, nausea and vomiting  Genitourinary: Negative for dysuria, flank pain and hematuria  Musculoskeletal: Negative for back pain, neck pain and neck stiffness  Skin: Negative for pallor and rash  Allergic/Immunologic: Negative for immunocompromised state  Neurological: Negative for syncope, weakness, numbness and headaches  Hematological: Negative for adenopathy  Psychiatric/Behavioral: Negative for agitation and confusion  All other systems reviewed and are negative        Physical Exam  ED Triage Vitals   Temperature Pulse Respirations Blood Pressure SpO2   19 1507 19 1506 19 1506 19 1506 19 1506   98 5 °F (36 9 °C) 80 20 140/74 98 %      Temp Source Heart Rate Source Patient Position - Orthostatic VS BP Location FiO2 (%)   19 1507 -- -- -- --   Oral          Pain Score       --                  Orthostatic Vital Signs  Vitals:    19 1506 19 1515   BP: 140/74 128/77   Pulse: 80 76 Physical Exam   Constitutional: He is oriented to person, place, and time  He appears well-developed and well-nourished  No distress  HENT:   Head: Normocephalic and atraumatic  Right Ear: External ear normal    Left Ear: External ear normal    Nose: Nose normal    Mouth/Throat: Oropharynx is clear and moist    Eyes: Pupils are equal, round, and reactive to light  Conjunctivae and EOM are normal  No scleral icterus  Neck: Normal range of motion  Neck supple  No JVD present  No tracheal deviation present  Cardiovascular: Normal rate and regular rhythm  Exam reveals no gallop and no friction rub  No murmur heard  Pulmonary/Chest: Effort normal and breath sounds normal  No stridor  No respiratory distress  He has no wheezes  He has no rales  He exhibits no tenderness  Abdominal: Soft  He exhibits no distension  There is no tenderness  There is no rebound and no guarding  Musculoskeletal: Normal range of motion  He exhibits no edema or tenderness  Lymphadenopathy:     He has no cervical adenopathy  Neurological: He is alert and oriented to person, place, and time  No cranial nerve deficit or sensory deficit  He exhibits normal muscle tone  Skin: Skin is warm and dry  He is not diaphoretic  There is erythema  No pallor  There is a small open wound on the dorsal aspect of the right hallux that probes to bone with expressible purulence  There is several cm of erythema extending up the dorsal aspect of the right foot  Psychiatric: He has a normal mood and affect  His behavior is normal    Nursing note and vitals reviewed  ED Medications  Medications - No data to display    Diagnostic Studies  Results Reviewed     None                 No orders to display         Procedures  Procedures      Phone Consults  ED Phone Contact    ED Course  ED Course as of Feb 24 1713 Fri Feb 22, 2019 1947 Patient going back to sniff at 8:30 p m                 Identification of Seniors at Risk Most Recent Value   (ISAR) Identification of Seniors at Risk   Before the illness or injury that brought you to the Emergency, did you need someone to help you on a regular basis? 1 Filed at: 02/22/2019 1508   In the last 24 hours, have you needed more help than usual?  1 Filed at: 02/22/2019 8150   Have you been hospitalized for one or more nights during the past 6 months? 1 Filed at: 02/22/2019 1508   In general, do you see well?  0 Filed at: 02/22/2019 1508   In general, do you have serious problems with your memory? 1 Filed at: 02/22/2019 1508   Do you take more than three different medications every day? 1 Filed at: 02/22/2019 1508   ISAR Score  5 Filed at: 02/22/2019 1508          MDM  Number of Diagnoses or Management Options  MRSA cellulitis of right foot: new and requires workup  Open wound of right great toe, initial encounter: new and requires workup     Amount and/or Complexity of Data Reviewed  Clinical lab tests: ordered and reviewed  Tests in the radiology section of CPT®: ordered and reviewed  Discussion of test results with the performing providers: yes  Decide to obtain previous medical records or to obtain history from someone other than the patient: yes  Obtain history from someone other than the patient: yes  Review and summarize past medical records: yes  Discuss the patient with other providers: yes  Independent visualization of images, tracings, or specimens: yes    Patient Progress  Patient progress: stable     19-year-old gentleman presenting to the emergency department for MRSA positive infection of the right hallux  Skilled nursing facility concerned about spreading erythema, although patient is already on IV vancomycin via PICC placed yesterday  I attempted to contact [facility] to confirm patient has only received 1 dose of IV vancomycin, but there was no one available to speak with me that was able to confirm this    If patient has only received a single dose of vancomycin then I suspect that his erythema does not truly represent antibiotic failure  Patient had blood work done yesterday at [facility]  No leukocytosis  No bumped creatinine  He had an x-ray performed yesterday as well of the right foot that showed no changes concerning for osteomyelitis  PICC placement was also confirmed by x-ray yesterday  Podiatry consulted (Dr Eliz Mcadams), who came and evaluated the patient in the emergency department  Patient and daughter are not interested in admission to the hospital, although podiatry did recommend admission for monitoring, continued IV abxs, and possible debridement versus amputation of the right toe  Patient and daughter tell me they understand that by electing for discharge there is a possibility that the infection could become worse, lead to infection of the bone, and result in patient requiring more significant surgical debridement or amputation of a larger portion of his lower extremity  They also understand that they should return to the emergency department if patient begins feeling ill, becomes febrile, has worsening pain in his right foot, or spreading erythema beyond the currently marked margins  Wound swabs ordered by Podiatry  They will follow up with him in 1 week as an outpatient  Patient already has an MRI ordered for evaluation of osteomyelitis of the right hallux  He should continue anticoagulation for his DVTs  He is not complaining of any worsening leg pain, swelling, shortness of breath, or chest pain today  Disposition  Final diagnoses:   None     ED Disposition     None      Follow-up Information    None         Patient's Medications   Discharge Prescriptions    No medications on file     No discharge procedures on file  ED Provider  Attending physically available and evaluated Tyesha Jones I managed the patient along with the ED Attending      Electronically Signed by         Sanjeev Glass MD  02/24/19 4919

## 2019-02-22 NOTE — ED ATTENDING ATTESTATION
Marlin Sprague MD, saw and evaluated the patient  I have discussed the patient with the resident/non-physician practitioner and agree with the resident's/non-physician practitioner's findings, Plan of Care, and MDM as documented in the resident's/non-physician practitioner's note, except where noted  All available labs and Radiology studies were reviewed  At this point I agree with the current assessment done in the Emergency Department  I have conducted an independent evaluation of this patient a history and physical is as follows:      Critical Care Time  Procedures     79 yo male from [facility], recently dx with mrsa infection of right great toe and had picc placed and iv vancomycin given started yesterday  Pt with recent le dopplers and has dvt bilaterally and on lovenox  Pt today noted to have worsening redness of toe  No cp, no sob, no fever  Pt with no complaints  Vss, afebrile, lungs cta, rrr, abdomen soft nontender, toe with purulent drainage, erythema on foot  Discuss with podiatry

## 2019-02-22 NOTE — CONSULTS
Consult - Podiatry   Paco Jacobo 80 y o  male MRN: [id number]  Unit/Bed#: ED 19 Encounter: [id number]    Assessment/Plan     Assessment:  1) Right foot wound with cellulitis and clinical suspicion of osteomyelitis  - wound on dorsal hallux IPJ, mild purulent drainage from joint, probes to bone, erythema to dorsal foot    2) Peripheral arterial disease  - nonpalpable pulses, dopplerable    Plan:  - discussed with daughter treatment will likely include eventual amputation especially with purulent drainage coming from joint; discussed goals of care with daughter and she does not wish patient to be admitted to hospital at this time and would like to try conservative treatment with abx first  - patient is afebrile and his toe appears to be stable at this time however if redness gets worst and if patient becomes medically unstable, he will need further workup and likely admission   - recommend oral bactrim for 10days if no NELLY or doxycycline 100mg BID, follow-up with podiatry outpatient within one week  - follow-up on wound cultures    History of Present Illness     HPI:  Paco Jacobo is a 80 y o  male with PMH significant for atrial fibrillation, ambulatory dysfunction presents for right foot cellulitis  Daughter is at bedside and provided majority history  Patient is a resident [facility]  Was recently evaluated by a nursing home podiatrist who noticed wound on top of right hallux and was told need to be further evaluated  Nursing home staff noted redness to his right big toe 2 days ago, wound cultures revealed MRSA, x-ray negative for osteomyelitis however recommended MRI  Nursing home sent patient to ED for further evaluation as cellulitis was getting worst  Patient's daughter states she doesn't think patient will tolerate being in hospital because it takes him awhile to "bounce back"  States he was started on IV vancomycin last evening and so far received two doses      Consults  Review of Systems   Constitutional: Negative  HENT: Negative  Eyes: Negative  Respiratory: Negative  Cardiovascular: Negative  Gastrointestinal: Negative  Musculoskeletal: severe right ankle deformity   Skin:open wound on right great toe with redness   Neurological: Negative  Psych: negative  Historical Information   Past Medical History:   Diagnosis Date    Actinic keratosis     Anemia     chronic hemolytic anemia    Atrial fibrillation (HCC)     Benign prostatic hypertrophy     Blood in urine     Cellulitis     Dermatitis     Disease of thyroid gland     Dyslipidemia     Epidural hematoma (HCC)     Epistaxis     Falling     Folate deficiency     GERD (gastroesophageal reflux disease)     Hip fracture (HCC)     Hyperlipidemia     Hypertension     Hypothyroidism (acquired)     Insomnia     Neuropathy     Nocturia     Osteoarthritis     Psychiatric disorder     depression    Renal disorder     Acute renal failure    Sepsis (HCC)     Shoulder pain, right     Squamous cell carcinoma     Subdural hematoma (HCC)     Thrombophlebitis     Urinary retention      Past Surgical History:   Procedure Laterality Date    HIP SURGERY       Social History   Social History     Substance and Sexual Activity   Alcohol Use No    Comment: once a week     Social History     Substance and Sexual Activity   Drug Use No     Social History     Tobacco Use   Smoking Status Former Smoker    Last attempt to quit: 1976    Years since quittin 0   Smokeless Tobacco Former User     Family History: History reviewed  No pertinent family history      Meds/Allergies     (Not in a hospital admission)  No Known Allergies    Objective   First Vitals:   Blood Pressure: 140/74 (19 1506)  Pulse: 80 (19 1506)  Temperature: 98 5 °F (36 9 °C) (19 1507)  Temp Source: Oral (19 1507)  Respirations: 20 (19 1506)  Weight - Scale: 84 8 kg (187 lb) (19 1506)  SpO2: 98 % (02/22/19 1506)    Current Vitals:   Blood Pressure: 134/84 (02/22/19 1615)  Pulse: 74 (02/22/19 1615)  Temperature: 98 5 °F (36 9 °C) (02/22/19 1507)  Temp Source: Oral (02/22/19 1507)  Respirations: 20 (02/22/19 1506)  Weight - Scale: 84 8 kg (187 lb) (02/22/19 1506)  SpO2: 98 % (02/22/19 1615)        /84   Pulse 74   Temp 98 5 °F (36 9 °C) (Oral)   Resp 20   Wt 84 8 kg (187 lb)   SpO2 98%   BMI 28 43 kg/m²      General Appearance:    Alert, cooperative, no distress   Head:    Normocephalic, without obvious abnormality, atraumatic   Eyes:    PERRL, conjunctiva/corneas clear, EOM's intact        Nose:   Moist mucous membranes   Neck:   Supple, symmetrical, trachea midline   Back:     Symmetric   Lungs:     Respirations unlabored   Heart:    Regular rate and rhythm, S1 and S2 normal, no murmur, rub   or gallop   Abdomen:     Soft, non-tender   Extremities:   MSK function WNL   Pulses:   Pedal pulses nonpalpable   Skin:   Open wound on right hallux IPJ, purulence from joint, probes to bone, erythema to dorsal foot   Neurologic:   Gross sensation is intact  Protective sensation is diminished  Lab Results:   No visits with results within 1 Day(s) from this visit     Latest known visit with results is:   Admission on 11/07/2018, Discharged on 11/10/2018   Component Date Value    WBC 11/07/2018 7 51     RBC 11/07/2018 4 33     Hemoglobin 11/07/2018 13 0     Hematocrit 11/07/2018 39 1     MCV 11/07/2018 90     MCH 11/07/2018 30 0     MCHC 11/07/2018 33 2     RDW 11/07/2018 14 0     MPV 11/07/2018 10 3     Platelets 53/97/1770 106*    nRBC 11/07/2018 0     Neutrophils Relative 11/07/2018 76*    Immat GRANS % 11/07/2018 0     Lymphocytes Relative 11/07/2018 13*    Monocytes Relative 11/07/2018 10     Eosinophils Relative 11/07/2018 1     Basophils Relative 11/07/2018 0     Neutrophils Absolute 11/07/2018 5 65     Immature Grans Absolute 11/07/2018 0 02     Lymphocytes Absolute 11/07/2018 0 97     Monocytes Absolute 11/07/2018 0 74     Eosinophils Absolute 11/07/2018 0 10     Basophils Absolute 11/07/2018 0 03     Sodium 11/07/2018 142     Potassium 11/07/2018 4 0     Chloride 11/07/2018 106     CO2 11/07/2018 28     ANION GAP 11/07/2018 8     BUN 11/07/2018 23     Creatinine 11/07/2018 1 39*    Glucose 11/07/2018 97     Calcium 11/07/2018 8 7     eGFR 11/07/2018 45     MRSA Culture Only 11/07/2018 No Methicillin Resistant Staphlyococcus aureus (MRSA) isolated     Sodium 11/08/2018 144     Potassium 11/08/2018 3 6     Chloride 11/08/2018 109*    CO2 11/08/2018 26     ANION GAP 11/08/2018 9     BUN 11/08/2018 22     Creatinine 11/08/2018 1 30     Glucose 11/08/2018 107     Calcium 11/08/2018 8 2*    eGFR 11/08/2018 48     WBC 11/08/2018 7 14     RBC 11/08/2018 4 26     Hemoglobin 11/08/2018 12 5     Hematocrit 11/08/2018 37 9     MCV 11/08/2018 89     MCH 11/08/2018 29 3     MCHC 11/08/2018 33 0     RDW 11/08/2018 13 9     Platelets 21/37/3381 105*    MPV 11/08/2018 10 8                    Invalid input(s): LABAEARO            Imaging: I have personally reviewed pertinent films in PACS  EKG, Pathology, and Other Studies: I have personally reviewed pertinent reports        Code Status: Prior  Advance Directive and Living Will:      Power of :    POLST:

## 2019-02-22 NOTE — ED NOTES
[facility]  830pm BLS    SLETS 930pm  [place] medical transport - none  [place] - After midnight         Susan Hicks RN  02/22/19 7400

## 2019-02-22 NOTE — ED NOTES
Dr Cornell states to hold off on blood work as patient will not be admitted at this time       Rudi Don, JO  02/22/19 7680

## 2019-02-24 LAB
BACTERIA WND AEROBE CULT: ABNORMAL
GRAM STN SPEC: ABNORMAL
GRAM STN SPEC: ABNORMAL

## 2019-02-25 LAB — BACTERIA SPEC ANAEROBE CULT: NORMAL

## 2019-03-07 ENCOUNTER — HOSPITAL ENCOUNTER (OUTPATIENT)
Dept: RADIOLOGY | Facility: HOSPITAL | Age: 84
Discharge: HOME | End: 2019-03-07
Payer: MEDICARE

## 2019-03-07 DIAGNOSIS — I73.9 PAD (PERIPHERAL ARTERY DISEASE) (HCC): ICD-10-CM

## 2019-03-07 DIAGNOSIS — I82.493 DEEP VEIN THROMBOSIS (DVT) OF OTHER VEIN OF BOTH LOWER EXTREMITIES, UNSPECIFIED CHRONICITY (HCC): ICD-10-CM

## 2019-03-07 PROCEDURE — 73718 MRI LOWER EXTREMITY W/O DYE: CPT

## 2019-04-11 ENCOUNTER — OFFICE VISIT (OUTPATIENT)
Dept: INFECTIOUS DISEASES | Facility: CLINIC | Age: 84
End: 2019-04-11
Payer: MEDICARE

## 2019-04-11 VITALS
HEIGHT: 72 IN | TEMPERATURE: 96.7 F | BODY MASS INDEX: 25.06 KG/M2 | HEART RATE: 52 BPM | RESPIRATION RATE: 17 BRPM | WEIGHT: 185 LBS | DIASTOLIC BLOOD PRESSURE: 65 MMHG | SYSTOLIC BLOOD PRESSURE: 145 MMHG

## 2019-04-11 DIAGNOSIS — A49.02 MRSA INFECTION: ICD-10-CM

## 2019-04-11 DIAGNOSIS — M86.671 CHRONIC OSTEOMYELITIS OF TOE OF RIGHT FOOT (HCC): Primary | ICD-10-CM

## 2019-04-11 PROCEDURE — 99203 OFFICE O/P NEW LOW 30 MIN: CPT | Performed by: INTERNAL MEDICINE

## 2019-04-11 RX ORDER — DOXYCYCLINE HYCLATE 100 MG/1
100 CAPSULE ORAL EVERY 12 HOURS SCHEDULED
COMMUNITY
End: 2020-06-10 | Stop reason: HOSPADM

## 2019-04-11 RX ORDER — LEVOTHYROXINE SODIUM 0.07 MG/1
75 TABLET ORAL DAILY
COMMUNITY
End: 2020-06-10 | Stop reason: HOSPADM

## 2019-04-11 RX ORDER — BISACODYL 10 MG
10 SUPPOSITORY, RECTAL RECTAL DAILY
COMMUNITY
End: 2020-06-10 | Stop reason: HOSPADM

## 2019-04-11 RX ORDER — SODIUM PHOSPHATE, DIBASIC AND SODIUM PHOSPHATE, MONOBASIC, UNSPECIFIED FORM 7; 19 G/118ML; G/118ML
ENEMA RECTAL DAILY PRN
COMMUNITY
End: 2020-06-10 | Stop reason: HOSPADM

## 2019-04-11 RX ORDER — TRAMADOL HYDROCHLORIDE 50 MG/1
25 TABLET ORAL EVERY 6 HOURS PRN
Status: ON HOLD | COMMUNITY
End: 2020-05-10 | Stop reason: SDUPTHER

## 2019-07-31 ENCOUNTER — OFFICE VISIT (OUTPATIENT)
Dept: CARDIOLOGY CLINIC | Facility: CLINIC | Age: 84
End: 2019-07-31
Payer: MEDICARE

## 2019-07-31 VITALS — HEART RATE: 55 BPM | SYSTOLIC BLOOD PRESSURE: 126 MMHG | DIASTOLIC BLOOD PRESSURE: 66 MMHG

## 2019-07-31 DIAGNOSIS — I10 ESSENTIAL HYPERTENSION: ICD-10-CM

## 2019-07-31 DIAGNOSIS — I48.19 PERSISTENT ATRIAL FIBRILLATION (HCC): Primary | Chronic | ICD-10-CM

## 2019-07-31 DIAGNOSIS — E78.5 DYSLIPIDEMIA: ICD-10-CM

## 2019-07-31 DIAGNOSIS — Z86.79 HISTORY OF SUBDURAL HEMATOMA: Chronic | ICD-10-CM

## 2019-07-31 PROCEDURE — 99214 OFFICE O/P EST MOD 30 MIN: CPT | Performed by: INTERNAL MEDICINE

## 2019-07-31 PROCEDURE — 93000 ELECTROCARDIOGRAM COMPLETE: CPT | Performed by: INTERNAL MEDICINE

## 2019-07-31 PROCEDURE — 1124F ACP DISCUSS-NO DSCNMKR DOCD: CPT | Performed by: INTERNAL MEDICINE

## 2019-07-31 NOTE — PROGRESS NOTES
Cardiology Follow Up    Cipriano Vaughn  1/15/1929  [id number]  [address] [facility]  [address] [place] [address] [address]    1  Persistent atrial fibrillation ([facility] [address] )     2  Essential hypertension     3  Dyslipidemia     4  History of subdural hematoma         Discussion/Summary: At my last visit the patient was taken off anticoagulation now he has been started back on Eliquis  He is now wheelchair bound and fall risk was felt to be decreased  Based on physical exam aortic stenosis has worsened  In discussions with the patient and family will proceed with conservative medical management alone  Will not do any further testing  EKG shows rate controlled atrial fibrillation  Blood pressures been stable  Patient offers no complaints  Follow-up 8-12 months  Interval History:  80year-old gentle with a history of persistent atrial fibrillation off anticoagulation secondary to multiple falls and subdural hematoma, hypertension, hyperlipidemia presents for follow-up visit  Denies chest pain, shortness of breath, palpitations, lightheadedness, dizziness, or syncope  He denies any lower extremity edema, PND, orthopnea  He is quite deconditioned he has a bedsore now  Functional capacity has been declining with age  Since her last visit the patient has been further reducing in terms of his mental and functional capacity  He is now wheelchair bound  He gets through the day okay with minimal symptoms  There has been no recent falls  He denies any chest pain, shortness of breath, lightheadedness, dizziness, or syncope  His been no lower extremity edema, PND, orthopnea  His memory has been somewhat difficult  He has been suffering from some skin tears another typical problems of a 29-year-old gentleman      Problem List     Subdural hematoma (HCC)    Mild traumatic brain injury [facility])    Atrial fibrillation ([facility] [address] ) (Chronic)    Overview Signed 2/6/2018  7:45 AM by Sekou Angelo DO     Description: Paroxysmal, on chronic anticoagulation         History of coagulopathy    Hypothyroidism (Chronic)    Chronic anemia (Chronic)    Hypertension (Chronic)    Hyperlipidemia (Chronic)    BPH (benign prostatic hyperplasia) (Chronic)    History of subdural hematoma (Chronic)    Overview Signed 2/4/2016 10:12 AM by Karthik Anderson PA-C     January 2015         Osteoarthritis (Chronic)    Ambulatory dysfunction    Hypoalbuminemia due to protein-calorie malnutrition (HCC)    Benign prostatic hypertrophy    Hypothyroidism (acquired)    Closed fracture of second lumbar vertebra with routine healing    Subarachnoid bleed (HCC)    Acute midline low back pain with sciatica    Dyslipidemia    Leg edema        Past Medical History:   Diagnosis Date    Actinic keratosis     Anemia     chronic hemolytic anemia    Atrial fibrillation (HCC)     Benign prostatic hypertrophy     Blood in urine     Cellulitis     Dermatitis     Disease of thyroid gland     Dyslipidemia     Epidural hematoma (HCC)     Epistaxis     Falling     Folate deficiency     GERD (gastroesophageal reflux disease)     Hip fracture (HCC)     Hyperlipidemia     Hypertension     Hypothyroidism (acquired)     Insomnia     Neuropathy     Nocturia     Osteoarthritis     Psychiatric disorder     depression    Renal disorder     Acute renal failure    Sepsis ([facility] [address] )     Shoulder pain, right     Squamous cell carcinoma     Subdural hematoma (HCC)     Thrombophlebitis     Urinary retention      Social History     Socioeconomic History    Marital status:       Spouse name: Not on file    Number of children: Not on file    Years of education: Not on file    Highest education level: Not on file   Occupational History    Not on file   Social Needs    Financial resource strain: Not on file    Food insecurity:     Worry: Not on file     Inability: Not on file    Transportation needs:     Medical: Not on file     Non-medical: Not on file   Tobacco Use    Smoking status: Former Smoker     Last attempt to quit: 1976     Years since quittin 5    Smokeless tobacco: Former User   Substance and Sexual Activity    Alcohol use: No     Comment: once a week    Drug use: No    Sexual activity: Not on file   Lifestyle    Physical activity:     Days per week: Not on file     Minutes per session: Not on file    Stress: Not on file   Relationships    Social connections:     Talks on phone: Not on file     Gets together: Not on file     Attends [employer] service: Not on file     Active member of club or organization: Not on file     Attends meetings of clubs or organizations: Not on file     Relationship status: Not on file    Intimate partner violence:     Fear of current or ex partner: Not on file     Emotionally abused: Not on file     Physically abused: Not on file     Forced sexual activity: Not on file   Other Topics Concern    Not on file   Social History Narrative    Not on file      No family history on file  Past Surgical History:   Procedure Laterality Date    HIP SURGERY         Current Outpatient Medications:     acetaminophen (TYLENOL) 325 mg tablet, Take 2 tablets by mouth every 6 (six) hours as needed for mild pain (Patient taking differently: Take 650 mg by mouth 3 (three) times a day  ), Disp: 30 tablet, Rfl: 0    apixaban (ELIQUIS) 5 mg, Take 5 mg by mouth 2 (two) times a day, Disp: , Rfl:     bisacodyl (BISAC-EVAC) 10 mg suppository, Insert 10 mg into the rectum daily, Disp: , Rfl:     Calcium Citrate 250 MG TABS, Take 4 tablets by mouth daily, Disp: , Rfl:     Cholecalciferol (VITAMIN D3) 2000 UNITS TABS, Take 2,000 Units by mouth daily  , Disp: , Rfl:     cyanocobalamin (VITAMIN B-12) 1,000 mcg tablet, Take 1,000 mcg by mouth daily  , Disp: , Rfl:     doxycycline hyclate (VIBRAMYCIN) 100 mg capsule, Take 100 mg by mouth every 12 (twelve) hours, Disp: , Rfl:     finasteride (PROSCAR) 5 mg tablet, Take 5 mg by mouth daily  , Disp: , Rfl:     gabapentin (NEURONTIN) 100 mg capsule, Take 1 capsule by mouth daily at bedtime, Disp: 5 capsule, Rfl: 0    levothyroxine 75 mcg tablet, Take 75 mcg by mouth daily , Disp: , Rfl:     magnesium hydroxide (MILK OF MAGNESIA) 400 mg/5 mL oral suspension, Take 30 mL by mouth daily as needed for constipation, Disp: , Rfl:     SB POLYETHYLENE GLYCOL 3350 PO, Take by mouth as needed, Disp: , Rfl:     Sodium Phosphates (ENEMA READY-TO-USE) 7-19 GM/118ML ENEM, Insert into the rectum daily as needed, Disp: , Rfl:     Tamsulosin HCl (FLOMAX PO), Take 0 4 mg by mouth daily  , Disp: , Rfl:     traMADol (ULTRAM) 50 mg tablet, Take 25 mg by mouth every 6 (six) hours as needed for moderate pain, Disp: , Rfl:   No Known Allergies    Labs:     Chemistry        Component Value Date/Time     01/26/2014 0555    K 3 6 11/08/2018 0526    K 4 0 01/26/2014 0555     (H) 11/08/2018 0526     01/26/2014 0555    CO2 26 11/08/2018 0526    CO2 27 01/26/2014 0555    BUN 22 11/08/2018 0526    BUN 18 01/26/2014 0555    CREATININE 1 30 11/08/2018 0526    CREATININE 1 27 01/26/2014 0555        Component Value Date/Time    CALCIUM 8 2 (L) 11/08/2018 0526    CALCIUM 7 9 (L) 01/26/2014 0555    ALKPHOS 45 (L) 12/15/2017 0614    ALKPHOS 100 01/22/2014 0008    AST 13 12/15/2017 0614    AST 21 01/22/2014 0008    ALT 12 12/15/2017 0614    ALT 16 01/22/2014 0008    BILITOT 0 3 01/22/2014 0008            No results found for: CHOL  No results found for: HDL  No results found for: LDLCALC  No results found for: TRIG  No results found for: CHOLHDL    Imaging: Xr Spine Thoracic 2 Vw    Result Date: 1/31/2018  Narrative: THORACIC SPINE INDICATION: Back pain with sciatica, unspecified side  History of fracture   COMPARISON: MRI thoracolumbar spine 12/14/2017 VIEWS:  Erect AP and lateral projections IMAGES:  3 FINDINGS: Thoracic dextroscoliosis with increased thoracic kyphosis  T9 and L1 fracture deformities described on recent MRI are not clearly appreciated by plain film  There is no discernible loss of vertebral body height  No other discernible fractures are seen  Bridging paravertebral ossifications are seen throughout the thoracic and upper lumbar spine  There is no displacement of the paraspinal line  The pedicles are difficult to evaluate on this study  Impression: Limited study  T9 and L1 fracture deformities described on recent MRI are not clearly appreciated by plain film  Thoracic dextroscoliosis with bridging ossifications throughout the visualized spine  Workstation performed: [id number]       ECG:  Atrial fibrillation with controlled ventricular rate  Review of Systems   Constitution: Negative  HENT: Negative  Eyes: Negative  Cardiovascular: Negative  Respiratory: Negative  Endocrine: Negative  Hematologic/Lymphatic: Negative  Skin: Negative  Musculoskeletal: Negative  Gastrointestinal: Negative  Genitourinary: Negative  Neurological: Negative  Psychiatric/Behavioral: Negative  Vitals:    07/31/19 0947   BP: 126/66   Pulse: 55     Vitals: There is no height or weight on file to calculate BMI      Physical Exam:  General:  Alert and cooperative, appears stated age  HEENT:  PERRLA, EOMI, no scleral icterus, no conjunctival pallor  Neck:  No lymphadenopathy, no thyromegaly, no carotid bruits, no elevated JVP  Heart:  irregular rate and rhythm, normal S1/S2, no E7/U3, 2/6 systolic ejection murmur  Lungs:  Clear to auscultation bilaterally   Abdomen:  Soft, non-tender, positive bowel sounds, no rebound or guarding,   no organomegaly   Extremities:  No clubbing, cyanosis or edema   Vascular:  2+ pedal pulses  Skin:  No rashes or lesions on exposed skin  Neurologic:  Cranial nerves II-XII grossly intact without focal deficits

## 2019-10-23 ENCOUNTER — APPOINTMENT (INPATIENT)
Dept: CT IMAGING | Facility: HOSPITAL | Age: 84
DRG: 872 | End: 2019-10-23
Payer: MEDICARE

## 2019-10-23 ENCOUNTER — HOSPITAL ENCOUNTER (INPATIENT)
Facility: HOSPITAL | Age: 84
LOS: 3 days | Discharge: NURSING, REHAB OR LONG-TERM CARE | DRG: 872 | End: 2019-10-26
Attending: EMERGENCY MEDICINE | Admitting: INTERNAL MEDICINE
Payer: MEDICARE

## 2019-10-23 ENCOUNTER — APPOINTMENT (EMERGENCY)
Dept: RADIOLOGY | Facility: HOSPITAL | Age: 84
DRG: 872 | End: 2019-10-23
Payer: MEDICARE

## 2019-10-23 DIAGNOSIS — R09.02 HYPOXIA: ICD-10-CM

## 2019-10-23 DIAGNOSIS — A41.9 SEVERE SEPSIS (HCC): Primary | ICD-10-CM

## 2019-10-23 DIAGNOSIS — S91.101A OPEN WOUND OF RIGHT GREAT TOE, INITIAL ENCOUNTER: ICD-10-CM

## 2019-10-23 DIAGNOSIS — N39.0 UTI (URINARY TRACT INFECTION): ICD-10-CM

## 2019-10-23 DIAGNOSIS — R65.20 SEVERE SEPSIS (HCC): Primary | ICD-10-CM

## 2019-10-23 PROBLEM — N17.9 AKI (ACUTE KIDNEY INJURY) (HCC): Status: ACTIVE | Noted: 2019-10-23

## 2019-10-23 LAB
ALBUMIN SERPL BCP-MCNC: 2.9 G/DL (ref 3.5–5)
ALP SERPL-CCNC: 88 U/L (ref 46–116)
ALT SERPL W P-5'-P-CCNC: 11 U/L (ref 12–78)
AMORPH URATE CRY URNS QL MICRO: ABNORMAL /HPF
ANION GAP SERPL CALCULATED.3IONS-SCNC: 9 MMOL/L (ref 4–13)
APTT PPP: 35 SECONDS (ref 23–37)
AST SERPL W P-5'-P-CCNC: 14 U/L (ref 5–45)
ATRIAL RATE: 100 BPM
BACTERIA UR QL AUTO: ABNORMAL /HPF
BASOPHILS # BLD AUTO: 0.03 THOUSANDS/ΜL (ref 0–0.1)
BASOPHILS NFR BLD AUTO: 0 % (ref 0–1)
BILIRUB SERPL-MCNC: 0.6 MG/DL (ref 0.2–1)
BILIRUB UR QL STRIP: NEGATIVE
BUN SERPL-MCNC: 25 MG/DL (ref 5–25)
CALCIUM SERPL-MCNC: 8.8 MG/DL (ref 8.3–10.1)
CHLORIDE SERPL-SCNC: 105 MMOL/L (ref 100–108)
CLARITY UR: CLEAR
CO2 SERPL-SCNC: 28 MMOL/L (ref 21–32)
COLOR UR: YELLOW
CREAT SERPL-MCNC: 1.53 MG/DL (ref 0.6–1.3)
EOSINOPHIL # BLD AUTO: 0.01 THOUSAND/ΜL (ref 0–0.61)
EOSINOPHIL NFR BLD AUTO: 0 % (ref 0–6)
ERYTHROCYTE [DISTWIDTH] IN BLOOD BY AUTOMATED COUNT: 15 % (ref 11.6–15.1)
GFR SERPL CREATININE-BSD FRML MDRD: 39 ML/MIN/1.73SQ M
GLUCOSE SERPL-MCNC: 149 MG/DL (ref 65–140)
GLUCOSE UR STRIP-MCNC: NEGATIVE MG/DL
HCT VFR BLD AUTO: 38.2 % (ref 36.5–49.3)
HGB BLD-MCNC: 11.9 G/DL (ref 12–17)
HGB UR QL STRIP.AUTO: ABNORMAL
IMM GRANULOCYTES # BLD AUTO: 0.06 THOUSAND/UL (ref 0–0.2)
IMM GRANULOCYTES NFR BLD AUTO: 1 % (ref 0–2)
INR PPP: 1.48 (ref 0.84–1.19)
KETONES UR STRIP-MCNC: ABNORMAL MG/DL
LACTATE SERPL-SCNC: 3.4 MMOL/L (ref 0.5–2)
LACTATE SERPL-SCNC: 3.4 MMOL/L (ref 0.5–2)
LEUKOCYTE ESTERASE UR QL STRIP: ABNORMAL
LYMPHOCYTES # BLD AUTO: 0.41 THOUSANDS/ΜL (ref 0.6–4.47)
LYMPHOCYTES NFR BLD AUTO: 4 % (ref 14–44)
MCH RBC QN AUTO: 27.8 PG (ref 26.8–34.3)
MCHC RBC AUTO-ENTMCNC: 31.2 G/DL (ref 31.4–37.4)
MCV RBC AUTO: 89 FL (ref 82–98)
MONOCYTES # BLD AUTO: 0.91 THOUSAND/ΜL (ref 0.17–1.22)
MONOCYTES NFR BLD AUTO: 8 % (ref 4–12)
NEUTROPHILS # BLD AUTO: 10.34 THOUSANDS/ΜL (ref 1.85–7.62)
NEUTS SEG NFR BLD AUTO: 87 % (ref 43–75)
NITRITE UR QL STRIP: NEGATIVE
NON-SQ EPI CELLS URNS QL MICRO: ABNORMAL /HPF
NRBC BLD AUTO-RTO: 0 /100 WBCS
NT-PROBNP SERPL-MCNC: 5499 PG/ML
PH UR STRIP.AUTO: 5.5 [PH]
PLATELET # BLD AUTO: 154 THOUSANDS/UL (ref 149–390)
PMV BLD AUTO: 10 FL (ref 8.9–12.7)
POTASSIUM SERPL-SCNC: 3.9 MMOL/L (ref 3.5–5.3)
PROT SERPL-MCNC: 7 G/DL (ref 6.4–8.2)
PROT UR STRIP-MCNC: ABNORMAL MG/DL
PROTHROMBIN TIME: 17.2 SECONDS (ref 11.6–14.5)
QRS AXIS: 8 DEGREES
QRSD INTERVAL: 86 MS
QT INTERVAL: 526 MS
QTC INTERVAL: 662 MS
RBC # BLD AUTO: 4.28 MILLION/UL (ref 3.88–5.62)
RBC #/AREA URNS AUTO: ABNORMAL /HPF
SODIUM SERPL-SCNC: 142 MMOL/L (ref 136–145)
SP GR UR STRIP.AUTO: 1.02 (ref 1–1.03)
T WAVE AXIS: 65 DEGREES
UROBILINOGEN UR QL STRIP.AUTO: 0.2 E.U./DL
VENTRICULAR RATE: 95 BPM
WBC # BLD AUTO: 11.76 THOUSAND/UL (ref 4.31–10.16)
WBC #/AREA URNS AUTO: ABNORMAL /HPF

## 2019-10-23 PROCEDURE — 83605 ASSAY OF LACTIC ACID: CPT | Performed by: EMERGENCY MEDICINE

## 2019-10-23 PROCEDURE — 81001 URINALYSIS AUTO W/SCOPE: CPT | Performed by: EMERGENCY MEDICINE

## 2019-10-23 PROCEDURE — 96361 HYDRATE IV INFUSION ADD-ON: CPT

## 2019-10-23 PROCEDURE — 87186 SC STD MICRODIL/AGAR DIL: CPT | Performed by: EMERGENCY MEDICINE

## 2019-10-23 PROCEDURE — 71046 X-RAY EXAM CHEST 2 VIEWS: CPT

## 2019-10-23 PROCEDURE — 94762 N-INVAS EAR/PLS OXIMTRY CONT: CPT

## 2019-10-23 PROCEDURE — 87631 RESP VIRUS 3-5 TARGETS: CPT | Performed by: PHYSICIAN ASSISTANT

## 2019-10-23 PROCEDURE — 99285 EMERGENCY DEPT VISIT HI MDM: CPT

## 2019-10-23 PROCEDURE — 85025 COMPLETE CBC W/AUTO DIFF WBC: CPT | Performed by: EMERGENCY MEDICINE

## 2019-10-23 PROCEDURE — 83880 ASSAY OF NATRIURETIC PEPTIDE: CPT | Performed by: EMERGENCY MEDICINE

## 2019-10-23 PROCEDURE — 87040 BLOOD CULTURE FOR BACTERIA: CPT | Performed by: EMERGENCY MEDICINE

## 2019-10-23 PROCEDURE — 99223 1ST HOSP IP/OBS HIGH 75: CPT | Performed by: PHYSICIAN ASSISTANT

## 2019-10-23 PROCEDURE — 71250 CT THORAX DX C-: CPT

## 2019-10-23 PROCEDURE — 96365 THER/PROPH/DIAG IV INF INIT: CPT

## 2019-10-23 PROCEDURE — 36415 COLL VENOUS BLD VENIPUNCTURE: CPT | Performed by: EMERGENCY MEDICINE

## 2019-10-23 PROCEDURE — 93010 ELECTROCARDIOGRAM REPORT: CPT | Performed by: INTERNAL MEDICINE

## 2019-10-23 PROCEDURE — 85730 THROMBOPLASTIN TIME PARTIAL: CPT | Performed by: EMERGENCY MEDICINE

## 2019-10-23 PROCEDURE — 80053 COMPREHEN METABOLIC PANEL: CPT | Performed by: EMERGENCY MEDICINE

## 2019-10-23 PROCEDURE — 99285 EMERGENCY DEPT VISIT HI MDM: CPT | Performed by: EMERGENCY MEDICINE

## 2019-10-23 PROCEDURE — 93005 ELECTROCARDIOGRAM TRACING: CPT

## 2019-10-23 PROCEDURE — 87081 CULTURE SCREEN ONLY: CPT | Performed by: INTERNAL MEDICINE

## 2019-10-23 PROCEDURE — 84145 PROCALCITONIN (PCT): CPT | Performed by: EMERGENCY MEDICINE

## 2019-10-23 PROCEDURE — 87147 CULTURE TYPE IMMUNOLOGIC: CPT | Performed by: EMERGENCY MEDICINE

## 2019-10-23 PROCEDURE — 87077 CULTURE AEROBIC IDENTIFY: CPT | Performed by: EMERGENCY MEDICINE

## 2019-10-23 PROCEDURE — 85610 PROTHROMBIN TIME: CPT | Performed by: EMERGENCY MEDICINE

## 2019-10-23 PROCEDURE — 87086 URINE CULTURE/COLONY COUNT: CPT | Performed by: EMERGENCY MEDICINE

## 2019-10-23 PROCEDURE — 94760 N-INVAS EAR/PLS OXIMETRY 1: CPT

## 2019-10-23 RX ORDER — ALBUTEROL SULFATE 2.5 MG/3ML
2 SOLUTION RESPIRATORY (INHALATION) ONCE
Status: COMPLETED | OUTPATIENT
Start: 2019-10-23 | End: 2019-10-23

## 2019-10-23 RX ORDER — ASPIRIN 300 MG/1
300 SUPPOSITORY RECTAL ONCE
Status: COMPLETED | OUTPATIENT
Start: 2019-10-23 | End: 2019-10-23

## 2019-10-23 RX ORDER — IBUPROFEN 400 MG/1
400 TABLET ORAL ONCE
Status: DISCONTINUED | OUTPATIENT
Start: 2019-10-23 | End: 2019-10-23

## 2019-10-23 RX ORDER — FINASTERIDE 5 MG/1
5 TABLET, FILM COATED ORAL DAILY
Status: DISCONTINUED | OUTPATIENT
Start: 2019-10-24 | End: 2019-10-26 | Stop reason: HOSPADM

## 2019-10-23 RX ORDER — GABAPENTIN 100 MG/1
100 CAPSULE ORAL
Status: DISCONTINUED | OUTPATIENT
Start: 2019-10-23 | End: 2019-10-26 | Stop reason: HOSPADM

## 2019-10-23 RX ORDER — TAMSULOSIN HYDROCHLORIDE 0.4 MG/1
0.4 CAPSULE ORAL DAILY
Status: DISCONTINUED | OUTPATIENT
Start: 2019-10-24 | End: 2019-10-26 | Stop reason: HOSPADM

## 2019-10-23 RX ORDER — ACETAMINOPHEN 325 MG/1
650 TABLET ORAL EVERY 6 HOURS PRN
Status: DISCONTINUED | OUTPATIENT
Start: 2019-10-23 | End: 2019-10-26 | Stop reason: HOSPADM

## 2019-10-23 RX ORDER — LEVOTHYROXINE SODIUM 0.07 MG/1
75 TABLET ORAL
Status: DISCONTINUED | OUTPATIENT
Start: 2019-10-24 | End: 2019-10-26 | Stop reason: HOSPADM

## 2019-10-23 RX ORDER — SODIUM CHLORIDE, SODIUM GLUCONATE, SODIUM ACETATE, POTASSIUM CHLORIDE, MAGNESIUM CHLORIDE, SODIUM PHOSPHATE, DIBASIC, AND POTASSIUM PHOSPHATE .53; .5; .37; .037; .03; .012; .00082 G/100ML; G/100ML; G/100ML; G/100ML; G/100ML; G/100ML; G/100ML
75 INJECTION, SOLUTION INTRAVENOUS CONTINUOUS
Status: DISCONTINUED | OUTPATIENT
Start: 2019-10-23 | End: 2019-10-24

## 2019-10-23 RX ADMIN — SODIUM CHLORIDE 1000 ML: 0.9 INJECTION, SOLUTION INTRAVENOUS at 18:30

## 2019-10-23 RX ADMIN — ASPIRIN 300 MG: 300 SUPPOSITORY RECTAL at 19:19

## 2019-10-23 RX ADMIN — SODIUM CHLORIDE, SODIUM GLUCONATE, SODIUM ACETATE, POTASSIUM CHLORIDE AND MAGNESIUM CHLORIDE 75 ML/HR: 526; 502; 368; 37; 30 INJECTION, SOLUTION INTRAVENOUS at 22:50

## 2019-10-23 RX ADMIN — VANCOMYCIN HYDROCHLORIDE 1750 MG: 1 INJECTION, POWDER, LYOPHILIZED, FOR SOLUTION INTRAVENOUS at 23:16

## 2019-10-23 RX ADMIN — CEFEPIME HYDROCHLORIDE 2000 MG: 2 INJECTION, POWDER, FOR SOLUTION INTRAVENOUS at 19:18

## 2019-10-23 NOTE — ED PROVIDER NOTES
History  Chief Complaint   Patient presents with    Decreased Oxygen Level     from [facility] via EMS  was 90% RA, put on NC 4L     Patient is a 25-year-old male with history of hypertension, hyperlipidemia, congestive heart failure and paroxysmal atrial fibrillation who presents with fever  Patient resides at [facility] in daughter states that today she noticed some confusion and increased temperature  His fever was 103 at the facility and he was given Tylenol prior to transport to the emergency department  Daughter also notes cough and congestion since yesterday  He had a chest x-ray performed yesterday which was negative for pneumonia  Patient was also found to be hypoxic on room air  History provided by:  EMS personnel and relative  Fever - 75 years or older   Max temp prior to arrival:  103F  Temp source:  Oral  Duration:  1 day  Timing:  Constant  Progression:  Improving  Chronicity:  New  Relieved by:  Acetaminophen  Associated symptoms: confusion and cough    Associated symptoms: no chest pain, no chills, no diarrhea, no dysuria, no headaches, no nausea, no rash, no sore throat and no vomiting        Prior to Admission Medications   Prescriptions Last Dose Informant Patient Reported? Taking? Calcium Citrate 250 MG TABS  Outside Facility (Specify) Yes Yes   Sig: Take 4 tablets by mouth daily   Cholecalciferol (VITAMIN D3) [address] [place] [address] (Specify) Yes Yes   Sig: Take 2,000 Units by mouth daily     SB POLYETHYLENE GLYCOL 3350 PO  Outside Facility (Specify) Yes Yes   Sig: Take by mouth as needed   Sodium Phosphates (ENEMA READY-TO-USE) 7-19 GM/118ML ENEM  Outside Facility (Specify) Yes Yes   Sig: Insert into the rectum daily as needed   Tamsulosin HCl (FLOMAX PO)  Outside Facility (Specify) Yes Yes   Sig: Take 0 4 mg by mouth daily     acetaminophen (TYLENOL) 325 mg tablet  Outside Facility (Specify) No Yes   Sig: Take 2 tablets by mouth every 6 (six) hours as needed for mild pain   Patient taking differently: Take 650 mg by mouth 3 (three) times a day     apixaban (ELIQUIS) 5 mg  Outside Facility (Specify) Yes Yes   Sig: Take 5 mg by mouth 2 (two) times a day   bisacodyl (BISAC-EVAC) 10 mg suppository  Outside Facility (Specify) Yes Yes   Sig: Insert 10 mg into the rectum daily   cyanocobalamin (VITAMIN B-12) 1,000 mcg tablet  Outside Facility (Specify) Yes Yes   Sig: Take 1,000 mcg by mouth daily  doxycycline hyclate (VIBRAMYCIN) 100 mg capsule  Outside Facility (Specify) Yes Yes   Sig: Take 100 mg by mouth every 12 (twelve) hours   finasteride (PROSCAR) 5 mg tablet  Outside Facility (Specify) Yes Yes   Sig: Take 5 mg by mouth daily     gabapentin (NEURONTIN) 100 mg capsule  Outside Facility (Specify) No Yes   Sig: Take 1 capsule by mouth daily at bedtime   levothyroxine 75 mcg tablet  Outside Facility (Specify) Yes Yes   Sig: Take 75 mcg by mouth daily    magnesium hydroxide (MILK OF MAGNESIA) 400 mg/5 mL oral suspension  Outside Facility (Specify) Yes Yes   Sig: Take 30 mL by mouth daily as needed for constipation   traMADol (ULTRAM) 50 mg tablet  Outside Facility (Specify) Yes Yes   Sig: Take 25 mg by mouth every 6 (six) hours as needed for moderate pain      Facility-Administered Medications: None       Past Medical History:   Diagnosis Date    Actinic keratosis     Anemia     chronic hemolytic anemia    Atrial fibrillation (HCC)     Benign prostatic hypertrophy     Blood in urine     Cellulitis     Dermatitis     Disease of thyroid gland     Dyslipidemia     Epidural hematoma (HCC)     Epistaxis     Falling     Folate deficiency     GERD (gastroesophageal reflux disease)     Hip fracture (HCC)     Hyperlipidemia     Hypertension     Hypothyroidism (acquired)     Insomnia     Neuropathy     Nocturia     Osteoarthritis     Psychiatric disorder     depression    Renal disorder     Acute renal failure    Sepsis (HCC)     Shoulder pain, right     Squamous cell carcinoma     Subdural hematoma (HCC)     Thrombophlebitis     Urinary retention        Past Surgical History:   Procedure Laterality Date    HIP SURGERY         History reviewed  No pertinent family history  I have reviewed and agree with the history as documented  Social History     Tobacco Use    Smoking status: Former Smoker     Last attempt to quit: 1976     Years since quittin 7    Smokeless tobacco: Former User   Substance Use Topics    Alcohol use: No     Comment: once a week    Drug use: No        Review of Systems   Constitutional: Negative for chills, diaphoresis and fever  HENT: Negative for nosebleeds, sore throat and trouble swallowing  Eyes: Negative for photophobia, pain and visual disturbance  Respiratory: Positive for cough  Negative for chest tightness and shortness of breath  Cardiovascular: Negative for chest pain, palpitations and leg swelling  Gastrointestinal: Negative for abdominal pain, constipation, diarrhea, nausea and vomiting  Endocrine: Negative for polydipsia and polyuria  Genitourinary: Negative for difficulty urinating, dysuria and hematuria  Musculoskeletal: Negative for back pain, neck pain and neck stiffness  Skin: Negative for pallor and rash  Neurological: Negative for dizziness, syncope, light-headedness and headaches  Psychiatric/Behavioral: Positive for confusion  All other systems reviewed and are negative  Physical Exam  Physical Exam   Constitutional: He appears well-developed and well-nourished  No distress  HENT:   Head: Normocephalic and atraumatic  Mouth/Throat: Oropharynx is clear and moist  Mucous membranes are dry  Eyes: Pupils are equal, round, and reactive to light  EOM are normal    Neck: Normal range of motion  Neck supple  Cardiovascular: Normal rate, regular rhythm, normal heart sounds, intact distal pulses and normal pulses  Pulmonary/Chest: Effort normal  No respiratory distress  Coarse breath sounds throughout   Abdominal: Soft  He exhibits no distension  There is no tenderness  There is no rigidity, no rebound and no guarding  Musculoskeletal: Normal range of motion  He exhibits no edema or tenderness  Lymphadenopathy:     He has no cervical adenopathy  Neurological: He is alert  He has normal strength  He is disoriented (to time)  No cranial nerve deficit or sensory deficit  Dysarthria at baseline   Skin: Skin is warm and dry  Capillary refill takes less than 2 seconds  Psychiatric: He has a normal mood and affect  Nursing note and vitals reviewed        Vital Signs  ED Triage Vitals   Temperature Pulse Respirations Blood Pressure SpO2   10/23/19 1716 10/23/19 1715 10/23/19 1716 10/23/19 1715 10/23/19 1714   (!) 101 8 °F (38 8 °C) 102 (!) 24 136/70 96 %      Temp Source Heart Rate Source Patient Position - Orthostatic VS BP Location FiO2 (%)   10/23/19 1716 10/23/19 1716 10/23/19 1716 10/23/19 1716 --   Axillary Monitor Lying Right arm       Pain Score       10/23/19 1716       No Pain           Vitals:    10/23/19 2100 10/23/19 2130 10/23/19 2309 10/24/19 0300   BP: 121/67 106/58 101/59 132/70   Pulse: 84 84 (!) 54 64   Patient Position - Orthostatic VS: Lying Lying Lying Lying         Visual Acuity      ED Medications  Medications   acetaminophen (TYLENOL) tablet 650 mg (has no administration in time range)   finasteride (PROSCAR) tablet 5 mg (has no administration in time range)   gabapentin (NEURONTIN) capsule 100 mg (100 mg Oral Not Given 10/23/19 2319)   levothyroxine tablet 75 mcg (75 mcg Oral Not Given 10/24/19 0659)   tamsulosin (FLOMAX) capsule 0 4 mg (has no administration in time range)   multi-electrolyte (PLASMALYTE-A/ISOLYTE-S PH 7 4) IV solution (75 mL/hr Intravenous Restarted 10/24/19 0120)   apixaban (ELIQUIS) tablet 2 5 mg (2 5 mg Oral Not Given 10/23/19 2318)   vancomycin (VANCOCIN) 1,750 mg in sodium chloride 0 9 % 500 mL IVPB (1,750 mg Intravenous New Bag 10/23/19 2316)   cefTRIAXone (ROCEPHIN) 1,000 mg in dextrose 5 % 50 mL IVPB (has no administration in time range)   albuterol (FOR EMS ONLY) (2 5 mg/3 mL) 0 083 % inhalation solution 5 mg (0 mg Does not apply Given to EMS 10/23/19 1804)   sodium chloride 0 9 % bolus 1,000 mL (0 mL Intravenous Stopped 10/23/19 2007)   aspirin rectal suppository 300 mg (300 mg Rectal Given 10/23/19 1919)   cefepime (MAXIPIME) 2 g/50 mL dextrose IVPB (0 mg Intravenous Stopped 10/23/19 2113)       Diagnostic Studies  Results Reviewed     Procedure Component Value Units Date/Time    Procalcitonin [[id number]]  (Normal) Collected:  10/23/19 1756    Lab Status:  Final result Specimen:  Blood from Arm, Right Updated:  10/24/19 0039     Procalcitonin 0 16 ng/ml     Lactic acid x2 [[id number]]  (Abnormal) Collected:  10/23/19 2007    Lab Status:  Final result Specimen:  Blood from Arm, Right Updated:  10/23/19 2037     LACTIC ACID 3 4 mmol/L     Narrative:       Result may be elevated if tourniquet was used during collection  NT-BNP PRO [[id number]]  (Abnormal) Collected:  10/23/19 1756    Lab Status:  Final result Specimen:  Blood from Arm, Right Updated:  10/23/19 2027     NT-proBNP 5,499 pg/mL     Urine Microscopic [[id number]]  (Abnormal) Collected:  10/23/19 1935    Lab Status:  Final result Specimen:  Urine, Straight Cath Updated:  10/23/19 1955     RBC, UA 4-10 /hpf      WBC, UA 10-20 /hpf      Epithelial Cells Occasional /hpf      Bacteria, UA Moderate /hpf      AMORPH URATES Occasional /hpf     Urine culture [[id number]] Collected:  10/23/19 1935    Lab Status:   In process Specimen:  Urine, Straight Cath Updated:  10/23/19 1954    UA w Reflex to Microscopic w Reflex to Culture [[id number]]  (Abnormal) Collected:  10/23/19 1935    Lab Status:  Final result Specimen:  Urine, Straight Cath Updated:  10/23/19 1940     Color, UA Yellow     Clarity, UA Clear     Specific [place], UA 1 020     pH, UA 5 5     Leukocytes, UA Small Nitrite, UA Negative     Protein, UA Trace mg/dl      Glucose, UA Negative mg/dl      Ketones, UA Trace mg/dl      Urobilinogen, UA 0 2 E U /dl      Bilirubin, UA Negative     Blood, UA Moderate    Lactic acid x2 [[id number]]  (Abnormal) Collected:  10/23/19 1756    Lab Status:  Final result Specimen:  Blood from Arm, Right Updated:  10/23/19 1847     LACTIC ACID 3 4 mmol/L     Narrative:       Result may be elevated if tourniquet was used during collection      Comprehensive metabolic panel [[id number]]  (Abnormal) Collected:  10/23/19 1756    Lab Status:  Final result Specimen:  Blood from Arm, Right Updated:  10/23/19 1824     Sodium 142 mmol/L      Potassium 3 9 mmol/L      Chloride 105 mmol/L      CO2 28 mmol/L      ANION GAP 9 mmol/L      BUN 25 mg/dL      Creatinine 1 53 mg/dL      Glucose 149 mg/dL      Calcium 8 8 mg/dL      AST 14 U/L      ALT 11 U/L      Alkaline Phosphatase 88 U/L      Total Protein 7 0 g/dL      Albumin 2 9 g/dL      Total Bilirubin 0 60 mg/dL      eGFR 39 ml/min/1 73sq m     Narrative:       [place] guidelines for Chronic Kidney Disease (CKD):     Stage 1 with normal or high GFR (GFR > 90 mL/min/1 73 square meters)    Stage 2 Mild CKD (GFR = 60-89 mL/min/1 73 square meters)    Stage 3A Moderate CKD (GFR = 45-59 mL/min/1 73 square meters)    Stage 3B Moderate CKD (GFR = 30-44 mL/min/1 73 square meters)    Stage 4 Severe CKD (GFR = 15-29 mL/min/1 73 square meters)    Stage 5 End Stage CKD (GFR <15 mL/min/1 73 square meters)  Note: GFR calculation is accurate only with a steady state creatinine    Protime-INR [[id number]]  (Abnormal) Collected:  10/23/19 1756    Lab Status:  Final result Specimen:  Blood from Arm, Right Updated:  10/23/19 1817     Protime 17 2 seconds      INR 1 48    APTT [[id number]]  (Normal) Collected:  10/23/19 1756    Lab Status:  Final result Specimen:  Blood from Arm, Right Updated:  10/23/19 1817     PTT 35 seconds     Blood culture #2 [[id number]] Collected:  10/23/19 1757    Lab Status: In process Specimen:  Blood from Arm, Right Updated:  10/23/19 1809    Blood culture #1 [[id number]] Collected:  10/23/19 1756    Lab Status: In process Specimen:  Blood from Arm, Right Updated:  10/23/19 1809    CBC and differential [[id number]]  (Abnormal) Collected:  10/23/19 1756    Lab Status:  Final result Specimen:  Blood from Arm, Right Updated:  10/23/19 1808     WBC 11 76 Thousand/uL      RBC 4 28 Million/uL      Hemoglobin 11 9 g/dL      Hematocrit 38 2 %      MCV 89 fL      MCH 27 8 pg      MCHC 31 2 g/dL      RDW 15 0 %      MPV 10 0 fL      Platelets 562 Thousands/uL      nRBC 0 /100 WBCs      Neutrophils Relative 87 %      Immat GRANS % 1 %      Lymphocytes Relative 4 %      Monocytes Relative 8 %      Eosinophils Relative 0 %      Basophils Relative 0 %      Neutrophils Absolute 10 34 Thousands/µL      Immature Grans Absolute 0 06 Thousand/uL      Lymphocytes Absolute 0 41 Thousands/µL      Monocytes Absolute 0 91 Thousand/µL      Eosinophils Absolute 0 01 Thousand/µL      Basophils Absolute 0 03 Thousands/µL                  CT chest without contrast   Final Result by Demond Saenz MD (10/23 2307)      Diffuse bilateral interlobar septal thickening and patchy groundglass airspace disease consistent with pulmonary edema secondary to CHF or fluid overload               Workstation performed: [id number]         XR chest pa & lateral   ED Interpretation by Yonny Moreland DO (10/23 1855)   Cardiomegaly  Increased interstitial markings  No discrete infiltrates        Final Result by Jennifer Vides MD (10/23 1923)      Cardiomegaly and mild central vascular congestion            Workstation performed: [id number]                    Procedures  ECG 12 Lead Documentation Only  Date/Time: 10/23/2019 7:09 PM  Performed by: Yonny Moreland DO  Authorized by: Yonny Moreland DO     ECG reviewed by me, the ED Provider: yes    Patient location: ED  Previous ECG:     Previous ECG:  Compared to current    Similarity:  Changes noted    Comparison to cardiac monitor: Yes    Comments:      Atrial fibrillation at a rate of 95 beats per minute  Normal axis  Early R-wave progression  Nonspecific ST T wave abnormalities  Frequent PVCs  PVCs new from previous EKG from 12/14/2017  ED Course  ED Course as of Oct 24 0733   Wed Oct 23, 2019   1849 Sepsis alert called  Lactate elevated at 3 4  Will continue IV fluids  1945 Discussed with critical care  Will review chart and give me a call back  5 Spoke with critical care who agrees with admission to step-down level two                      Initial Sepsis Screening     [address] Name 10/23/19 1845                Is the patient's history suggestive of a new or worsening infection? (!) Yes (Proceed)  -CD        Suspected source of infection  suspect infection, source unknown  -CD        Are two or more of the following signs & symptoms of infection both present and new to the patient? (!) Yes (Proceed)  -CD        Indicate SIRS criteria  Hyperthemia > 38 3C (100 9F); Tachypnea > 20 resp per min; Altered mental status  -CD        If the answer is yes to both questions, suspicion of sepsis is present          If severe sepsis is present AND tissue hypoperfusion perists in the hour after fluid resuscitation or lactate > 4, the patient meets criteria for SEPTIC SHOCK          Are any of the following organ dysfunction criteria present within 6 hours of suspected infection and SIRS criteria that are NOT considered to be chronic conditions?   (!) Yes  -CD        Organ dysfunction  Lactate > 2 0 mmol/L  -CD        Date of presentation of severe sepsis  10/23/19  -CD        Time of presentation of severe sepsis  1912  -CD        Tissue hypoperfusion persists in the hour after crystalloid fluid administration, evidenced, by either:          Was hypotension present within one hour of the conclusion of crystalloid fluid administration?         Date of presentation of septic shock          Time of presentation of septic shock            User Key  (r) = Recorded By, (t) = Taken By, (c) = Cosigned By    Initials Name Provider Type    KARYN Gonsalez DO Physician           Default Flowsheet Data (last 720 hours)      Sepsis Reassess     Row Name 10/23/19 0138                   Repeat Volume Status and Tissue Perfusion Assessment Performed    Repeat Volume Status and Tissue Perfusion Assessment Performed  Yes  -AP           Volume Status and Tissue Perfusion Post Fluid Resuscitation * Must Document All *    Vital Signs Reviewed (HR, RR, BP, T)  Yes  -AP        Shock Index Reviewed  Yes  -AP        Arterial Oxygen Saturation Reviewed (POx, SaO2 or SpO2)  Yes (comment %)  -AP        Cardio  Other (comment) murmur  -AP        Pulmonary  Clear to auscultation  -AP        Capillary Refill          Peripheral Pulses          Skin  Warm;Dry  -AP        Urine output assessed             *OR*   Intensive Monitoring- Must Document One of the Following Four *:    Vital Signs Reviewed          * Central Venous Pressure (CVP or RAP)          * Central Venous Oxygen (SVO2, ScvO2 or Oxygen saturation via central catheter)          * Bedside Cardiovascular US in IVC diameter and % collapse          * Passive Leg Raise OR Crystalloid Challenge            User Key  (r) = Recorded By, (t) = Taken By, (c) = Cosigned By    Initials Name Provider Type    AP Joyce Styles PA-C Physician Assistant                MDM  Number of Diagnoses or Management Options  Hypoxia: new and requires workup  Severe sepsis [facility]): new and requires workup  Diagnosis management comments: Patient presents with fever, cough and altered mental status  Patient meets SIRS criteria and pulmonary source of infection suspected  Chest x-ray is negative  Will check CT chest to rule out occult pneumonia    Patient was given IV cefepime to cover for sepsis  Elevated lactate indicative of severe sepsis  Patient given a L of normal saline  Will reassess after 1 L given his history of congestive heart failure  There appears to be a component of acute on chronic CHF given chest x-ray findings and elevated BNP  Will need aggressive fluid resuscitation if patient becomes hypotensive or lactate trending up  Patient will be admitted to step-down unit  Patient was evaluated by critical care in the emergency department  Will continue antibiotics and repeat lactate at this time         Amount and/or Complexity of Data Reviewed  Clinical lab tests: ordered and reviewed  Tests in the radiology section of CPT®: ordered and reviewed  Tests in the medicine section of CPT®: ordered and reviewed  Review and summarize past medical records: yes  Independent visualization of images, tracings, or specimens: yes    Risk of Complications, Morbidity, and/or Mortality  Presenting problems: high  Diagnostic procedures: high  Management options: high    Patient Progress  Patient progress: stable      Disposition  Final diagnoses:   [facility]   Hypoxia     Time reflects when diagnosis was documented in both MDM as applicable and the Disposition within this note     Time User Action Codes Description Comment    10/23/2019  8:10 PM Torrey Rincon Add [A41 9,  R65 20] Severe sepsis ([facility] )     10/23/2019  8:10 PM Torrey Rincon Add [R09 02] Hypoxia     10/23/2019  9:11 PM Flory Rivera Modify [A41 9,  R65 20] Severe sepsis ([facility] )     10/23/2019  9:11 PM Jose J Villarreal Add [S91 101A] Open wound of right great toe, initial encounter     10/23/2019  9:11 PM Flory Rivera Modify [A41 9,  R65 20] Severe sepsis ([facility] )     10/23/2019  9:11 PM Jose J Villarreal Modify [A41 9,  R65 20] Severe sepsis [facility])       ED Disposition     ED Disposition Condition Date/Time Comment    Admit Stable Wed Oct 23, 2019  8:10 PM Case was discussed with FATIMAH and the patient's admission status was agreed to be Admission Status: inpatient status to the service of Dr Fabrizio Cordova   Follow-up Information    None         Current Discharge Medication List      CONTINUE these medications which have NOT CHANGED    Details   acetaminophen (TYLENOL) 325 mg tablet Take 2 tablets by mouth every 6 (six) hours as needed for mild pain  Qty: 30 tablet, Refills: 0      apixaban (ELIQUIS) 5 mg Take 5 mg by mouth 2 (two) times a day      bisacodyl (BISAC-EVAC) 10 mg suppository Insert 10 mg into the rectum daily      Calcium Citrate 250 MG TABS Take 4 tablets by mouth daily      Cholecalciferol (VITAMIN D3) 2000 UNITS TABS Take 2,000 Units by mouth daily  cyanocobalamin (VITAMIN B-12) 1,000 mcg tablet Take 1,000 mcg by mouth daily  doxycycline hyclate (VIBRAMYCIN) 100 mg capsule Take 100 mg by mouth every 12 (twelve) hours      finasteride (PROSCAR) 5 mg tablet Take 5 mg by mouth daily  gabapentin (NEURONTIN) 100 mg capsule Take 1 capsule by mouth daily at bedtime  Qty: 5 capsule, Refills: 0      levothyroxine 75 mcg tablet Take 75 mcg by mouth daily       magnesium hydroxide (MILK OF MAGNESIA) 400 mg/5 mL oral suspension Take 30 mL by mouth daily as needed for constipation      SB POLYETHYLENE GLYCOL 3350 PO Take by mouth as needed      Sodium Phosphates (ENEMA READY-TO-USE) 7-19 GM/118ML ENEM Insert into the rectum daily as needed      Tamsulosin HCl (FLOMAX PO) Take 0 4 mg by mouth daily        traMADol (ULTRAM) 50 mg tablet Take 25 mg by mouth every 6 (six) hours as needed for moderate pain           No discharge procedures on file      ED Provider  Electronically Signed by           Camille Ochoa DO  10/24/19 1463

## 2019-10-23 NOTE — SEPSIS NOTE
Sepsis Note   Johnnie Saldivar 80 y o  male MRN: [id number]  Unit/Bed#: ED 08 Encounter: [id number]      qSOFA     [address] Name 10/23/19 1716 10/23/19 1715             Altered mental status GCS < 15           Respiratory Rate > / =22  1         Systolic BP < / =551  0  0       Q Sofa Score  1             Initial Sepsis Screening     Row Name 10/23/19 2395                Is the patient's history suggestive of a new or worsening infection? (!) Yes (Proceed)  -CD        Suspected source of infection  suspect infection, source unknown  -CD        Are two or more of the following signs & symptoms of infection both present and new to the patient? (!) Yes (Proceed)  -CD        Indicate SIRS criteria  Hyperthemia > 38 3C (100 9F); Tachypnea > 20 resp per min; Altered mental status  -CD        If the answer is yes to both questions, suspicion of sepsis is present          If severe sepsis is present AND tissue hypoperfusion perists in the hour after fluid resuscitation or lactate > 4, the patient meets criteria for SEPTIC SHOCK          Are any of the following organ dysfunction criteria present within 6 hours of suspected infection and SIRS criteria that are NOT considered to be chronic conditions? (!) Yes  -CD        Organ dysfunction  Lactate > 2 0 mmol/L  -CD        Date of presentation of severe sepsis  10/23/19  -CD        Time of presentation of severe sepsis  1912  -CD        Tissue hypoperfusion persists in the hour after crystalloid fluid administration, evidenced, by either:          Was hypotension present within one hour of the conclusion of crystalloid fluid administration?           Date of presentation of septic shock          Time of presentation of septic shock            User Key  (r) = Recorded By, (t) = Taken By, (c) = Cosigned By    [address] Name Provider Type    CD Lavon Bloodgood, DO Physician

## 2019-10-24 ENCOUNTER — APPOINTMENT (INPATIENT)
Dept: NON INVASIVE DIAGNOSTICS | Facility: HOSPITAL | Age: 84
DRG: 872 | End: 2019-10-24
Payer: MEDICARE

## 2019-10-24 PROBLEM — N18.30 STAGE 3 CHRONIC KIDNEY DISEASE (HCC): Status: ACTIVE | Noted: 2019-10-23

## 2019-10-24 PROBLEM — I35.0 SEVERE AORTIC STENOSIS: Status: ACTIVE | Noted: 2019-10-24

## 2019-10-24 PROBLEM — E87.79 CARDIAC VOLUME OVERLOAD: Status: ACTIVE | Noted: 2019-10-24

## 2019-10-24 LAB
ANION GAP SERPL CALCULATED.3IONS-SCNC: 6 MMOL/L (ref 4–13)
BASOPHILS # BLD AUTO: 0.03 THOUSANDS/ΜL (ref 0–0.1)
BASOPHILS NFR BLD AUTO: 0 % (ref 0–1)
BUN SERPL-MCNC: 25 MG/DL (ref 5–25)
CALCIUM SERPL-MCNC: 8.4 MG/DL (ref 8.3–10.1)
CHLORIDE SERPL-SCNC: 107 MMOL/L (ref 100–108)
CO2 SERPL-SCNC: 28 MMOL/L (ref 21–32)
CREAT SERPL-MCNC: 1.52 MG/DL (ref 0.6–1.3)
EOSINOPHIL # BLD AUTO: 0 THOUSAND/ΜL (ref 0–0.61)
EOSINOPHIL NFR BLD AUTO: 0 % (ref 0–6)
ERYTHROCYTE [DISTWIDTH] IN BLOOD BY AUTOMATED COUNT: 15 % (ref 11.6–15.1)
FLUAV AG SPEC QL: NOT DETECTED
FLUBV AG SPEC QL: NOT DETECTED
GFR SERPL CREATININE-BSD FRML MDRD: 40 ML/MIN/1.73SQ M
GLUCOSE SERPL-MCNC: 116 MG/DL (ref 65–140)
HCT VFR BLD AUTO: 34.5 % (ref 36.5–49.3)
HGB BLD-MCNC: 10.4 G/DL (ref 12–17)
IMM GRANULOCYTES # BLD AUTO: 0.07 THOUSAND/UL (ref 0–0.2)
IMM GRANULOCYTES NFR BLD AUTO: 1 % (ref 0–2)
LACTATE SERPL-SCNC: 1.2 MMOL/L (ref 0.5–2)
LACTATE SERPL-SCNC: 2.1 MMOL/L (ref 0.5–2)
LYMPHOCYTES # BLD AUTO: 0.82 THOUSANDS/ΜL (ref 0.6–4.47)
LYMPHOCYTES NFR BLD AUTO: 7 % (ref 14–44)
MAGNESIUM SERPL-MCNC: 1.8 MG/DL (ref 1.6–2.6)
MCH RBC QN AUTO: 27.7 PG (ref 26.8–34.3)
MCHC RBC AUTO-ENTMCNC: 30.1 G/DL (ref 31.4–37.4)
MCV RBC AUTO: 92 FL (ref 82–98)
MONOCYTES # BLD AUTO: 0.93 THOUSAND/ΜL (ref 0.17–1.22)
MONOCYTES NFR BLD AUTO: 8 % (ref 4–12)
NEUTROPHILS # BLD AUTO: 10.01 THOUSANDS/ΜL (ref 1.85–7.62)
NEUTS SEG NFR BLD AUTO: 84 % (ref 43–75)
NRBC BLD AUTO-RTO: 0 /100 WBCS
PHOSPHATE SERPL-MCNC: 4.9 MG/DL (ref 2.3–4.1)
PLATELET # BLD AUTO: 144 THOUSANDS/UL (ref 149–390)
PMV BLD AUTO: 10.3 FL (ref 8.9–12.7)
POTASSIUM SERPL-SCNC: 4.6 MMOL/L (ref 3.5–5.3)
PROCALCITONIN SERPL-MCNC: 0.16 NG/ML
RBC # BLD AUTO: 3.76 MILLION/UL (ref 3.88–5.62)
RSV B RNA SPEC QL NAA+PROBE: NOT DETECTED
SODIUM SERPL-SCNC: 141 MMOL/L (ref 136–145)
WBC # BLD AUTO: 11.86 THOUSAND/UL (ref 4.31–10.16)

## 2019-10-24 PROCEDURE — 97110 THERAPEUTIC EXERCISES: CPT

## 2019-10-24 PROCEDURE — 83735 ASSAY OF MAGNESIUM: CPT | Performed by: INTERNAL MEDICINE

## 2019-10-24 PROCEDURE — 85025 COMPLETE CBC W/AUTO DIFF WBC: CPT | Performed by: INTERNAL MEDICINE

## 2019-10-24 PROCEDURE — 93306 TTE W/DOPPLER COMPLETE: CPT | Performed by: INTERNAL MEDICINE

## 2019-10-24 PROCEDURE — 93306 TTE W/DOPPLER COMPLETE: CPT

## 2019-10-24 PROCEDURE — G8978 MOBILITY CURRENT STATUS: HCPCS

## 2019-10-24 PROCEDURE — 83605 ASSAY OF LACTIC ACID: CPT | Performed by: INTERNAL MEDICINE

## 2019-10-24 PROCEDURE — 97163 PT EVAL HIGH COMPLEX 45 MIN: CPT

## 2019-10-24 PROCEDURE — 84100 ASSAY OF PHOSPHORUS: CPT | Performed by: INTERNAL MEDICINE

## 2019-10-24 PROCEDURE — 99222 1ST HOSP IP/OBS MODERATE 55: CPT | Performed by: PODIATRIST

## 2019-10-24 PROCEDURE — G8979 MOBILITY GOAL STATUS: HCPCS

## 2019-10-24 PROCEDURE — 80048 BASIC METABOLIC PNL TOTAL CA: CPT | Performed by: INTERNAL MEDICINE

## 2019-10-24 PROCEDURE — 99232 SBSQ HOSP IP/OBS MODERATE 35: CPT | Performed by: INTERNAL MEDICINE

## 2019-10-24 RX ORDER — FUROSEMIDE 10 MG/ML
40 INJECTION INTRAMUSCULAR; INTRAVENOUS ONCE
Status: COMPLETED | OUTPATIENT
Start: 2019-10-24 | End: 2019-10-24

## 2019-10-24 RX ADMIN — FINASTERIDE 5 MG: 5 TABLET, FILM COATED ORAL at 10:10

## 2019-10-24 RX ADMIN — FUROSEMIDE 40 MG: 10 INJECTION, SOLUTION INTRAMUSCULAR; INTRAVENOUS at 10:11

## 2019-10-24 RX ADMIN — CEFTRIAXONE SODIUM 1000 MG: 10 INJECTION, POWDER, FOR SOLUTION INTRAVENOUS at 18:56

## 2019-10-24 RX ADMIN — APIXABAN 2.5 MG: 2.5 TABLET, FILM COATED ORAL at 10:10

## 2019-10-24 RX ADMIN — APIXABAN 2.5 MG: 2.5 TABLET, FILM COATED ORAL at 19:13

## 2019-10-24 RX ADMIN — VANCOMYCIN HYDROCHLORIDE 1750 MG: 1 INJECTION, POWDER, LYOPHILIZED, FOR SOLUTION INTRAVENOUS at 22:04

## 2019-10-24 RX ADMIN — GABAPENTIN 100 MG: 100 CAPSULE ORAL at 22:05

## 2019-10-24 RX ADMIN — TAMSULOSIN HYDROCHLORIDE 0.4 MG: 0.4 CAPSULE ORAL at 10:10

## 2019-10-24 NOTE — PROGRESS NOTES
Vancomycin Assessment    Abdulkadir Vargas is a 80 y o  male who is currently receiving vancomycin 1750mg (20mg/kg) q 24hrs for bacteremia     Relevant clinical data and objective history reviewed:  Creatinine   Date Value Ref Range Status   10/24/2019 1 52 (H) 0 60 - 1 30 mg/dL Final     Comment:     Standardized to IDMS reference method   10/23/2019 1 53 (H) 0 60 - 1 30 mg/dL Final     Comment:     Standardized to IDMS reference method   11/08/2018 1 30 0 60 - 1 30 mg/dL Final     Comment:     Standardized to IDMS reference method   01/26/2014 1 27 0 60 - 1 30 mg/dL Final     Comment:     Standardized to IDMS reference method   01/24/2014 1 27 0 60 - 1 30 mg/dL Final     Comment:     Standardized to IDMS reference method   01/23/2014 1 24 0 60 - 1 30 mg/dL Final     Comment:     Standardized to IDMS reference method     /59 (BP Location: Left arm)   Pulse (!) 54   Temp 99 2 °F (37 3 °C) (Axillary)   Resp 18   Wt 86 1 kg (189 lb 13 1 oz)   SpO2 97%   BMI 25 74 kg/m²   No intake/output data recorded  Lab Results   Component Value Date/Time    BUN 25 10/24/2019 01:26 AM    BUN 18 01/26/2014 05:55 AM    WBC 11 86 (H) 10/24/2019 01:26 AM    WBC 6 08 01/26/2014 05:55 AM    HGB 10 4 (L) 10/24/2019 01:26 AM    HGB 9 2 (L) 01/26/2014 05:55 AM    HCT 34 5 (L) 10/24/2019 01:26 AM    HCT 27 5 (L) 01/26/2014 05:55 AM    MCV 92 10/24/2019 01:26 AM    MCV 89 01/26/2014 05:55 AM     (L) 10/24/2019 01:26 AM     (L) 01/26/2014 05:55 AM     Temp Readings from Last 3 Encounters:   10/23/19 99 2 °F (37 3 °C) (Axillary)   04/11/19 (!) 96 7 °F (35 9 °C)   02/22/19 98 5 °F (36 9 °C) (Oral)     Vancomycin Days of Therapy: 1    Assessment/Plan  The patient is currently on vancomycin utilizing scheduled dosing based on actual body weight  Baseline risks associated with therapy include: pre-existing renal impairment and advanced age    The patient is currently receiving 1750mg (20mg/kg) q 24hrs and is clinically appropriate and dose will be continued  Pharmacy will also follow closely for s/sx of nephrotoxicity, infusion reactions and appropriateness of therapy  BMP and CBC will be ordered per protocol  Plan for trough as patient approaches steady state, prior to the 4th  dose at approximately 2230 on 10/26/19  Due to infection severity, will target a trough of 15-20 (appropriate for most indications)   Pharmacy will continue to follow the patients culture results and clinical progress daily      Chivo Guzman, Pharmacist

## 2019-10-24 NOTE — ASSESSMENT & PLAN NOTE
Lactic Acid 3 4 X 2- gentle hydration was attempted due to questionable fluid overload on cxr  Procal Pending  1 L IVF given with no improvement in Lactic acid   Initiate Isolyte at 75cc  hr  Urine culture positive for bacteria and WBC - will begin coverage with Rocephin  Flu swab ordered  Blood cultures pending  Pcxr with no definitive pneumonia but does appear to be fluid congested  Patient has a notable murmur on exam which is concerning for endocarditis in light of of a long standing right toe wound that has been present for several months  Echo ordered  MRSA pending - will start empiric Vancomycin due to concern for possible osteomyelitis  Pt  Had an MRI of his right great toe in March which was concerning for osteomyelitis - family refused amputation of toe at that time due to concerns that he would not survive the surgery

## 2019-10-24 NOTE — SEPSIS NOTE
Sepsis Note   Christofer Head 80 y o  male MRN: [id number]  Unit/Bed#: ED 08 Encounter: [id number]      qSOFA     [address] Name 10/23/19 2100 10/23/19 2030 10/23/19 2015 10/23/19 1931 10/23/19 1716    Altered mental status GCS < 15              Respiratory Rate > / =22  0  0  0  0  1    Systolic BP < / =735  0  0  1  0  0    Q Sofa Score  0  0  1  0  1    Row Name 10/23/19 1715                Altered mental status GCS < 15          Respiratory Rate > / =11          Systolic BP < / =581  0        Q Sofa Score              Initial Sepsis Screening     Row Name 10/23/19 1845                Is the patient's history suggestive of a new or worsening infection? (!) Yes (Proceed)  -CD        Suspected source of infection  suspect infection, source unknown  -CD        Are two or more of the following signs & symptoms of infection both present and new to the patient? (!) Yes (Proceed)  -CD        Indicate SIRS criteria  Hyperthemia > 38 3C (100 9F); Tachypnea > 20 resp per min; Altered mental status  -CD        If the answer is yes to both questions, suspicion of sepsis is present          If severe sepsis is present AND tissue hypoperfusion perists in the hour after fluid resuscitation or lactate > 4, the patient meets criteria for SEPTIC SHOCK          Are any of the following organ dysfunction criteria present within 6 hours of suspected infection and SIRS criteria that are NOT considered to be chronic conditions? (!) Yes  -CD        Organ dysfunction  Lactate > 2 0 mmol/L  -CD        Date of presentation of severe sepsis  10/23/19  -CD        Time of presentation of severe sepsis  1912  -CD        Tissue hypoperfusion persists in the hour after crystalloid fluid administration, evidenced, by either:          Was hypotension present within one hour of the conclusion of crystalloid fluid administration?           Date of presentation of septic shock          Time of presentation of septic shock            User Key (r) = Recorded By, (t) = Taken By, (c) = Cosigned By    Initials Name Provider Type    CD Rox Nick DO Physician               Default Flowsheet Data (last 720 hours)      Sepsis Reassess     Row Name 10/23/19 1284                   Repeat Volume Status and Tissue Perfusion Assessment Performed    Repeat Volume Status and Tissue Perfusion Assessment Performed  Yes  -AP           Volume Status and Tissue Perfusion Post Fluid Resuscitation * Must Document All *    Vital Signs Reviewed (HR, RR, BP, T)  Yes  -AP        Shock Index Reviewed  Yes  -AP        Arterial Oxygen Saturation Reviewed (POx, SaO2 or SpO2)  Yes (comment %)  -AP        Cardio  Other (comment) murmur  -AP        Pulmonary  Clear to auscultation  -AP        Capillary Refill          Peripheral Pulses          Skin  Warm;Dry  -AP        Urine output assessed             *OR*   Intensive Monitoring- Must Document One of the Following Four *:    Vital Signs Reviewed          * Central Venous Pressure (CVP or RAP)          * Central Venous Oxygen (SVO2, ScvO2 or Oxygen saturation via central catheter)          * Bedside Cardiovascular US in IVC diameter and % collapse          * Passive Leg Raise OR Crystalloid Challenge            User Key  (r) = Recorded By, (t) = Taken By, (c) = Cosigned By    Initials Name Provider Type    KESHA Samuel PA-C Physician Assistant

## 2019-10-24 NOTE — ASSESSMENT & PLAN NOTE
· POA, evidence by fever of 101 8, leukocytosis, elevated lactic acid, positive urinalysis for bacteria and WBC  · Patient presenting from his nursing facility due to fever  · He received initial gentle IV fluids on presentation due to elevated lactic acid at 3 4  · Chest x-ray showed findings concerning for fluid overload  · Lactic acidosis resolved by this morning  · He is currently on IV antibiotics with ceftriaxone and vancomycin  · He has a right great toe chronic ulcer which could be possible source of entry for infection  · Influenza A/B/RSV PCR negative  Follow up on blood and urine culture results  · Initial procalcitonin was negative  Follow up on repeat in the a m

## 2019-10-24 NOTE — ASSESSMENT & PLAN NOTE
· hest x-ray on presentation showed cardiomegaly with mild central vascular congestion  · CT of the chest showed findings consistent with pulmonary edema  · His last echo in October of 2016 showed EF 60% with moderate aortic stenosis  · Echocardiogram repeated today showed LVEF 60%, severe aortic stenosis, study inadequate for evaluation of diastolic function  · Lasix 40 mg IV x1 dose given today and will observe for response  · Monitor intake and output, daily weights, 2 g salt diet  · He follows with Dr Marsha Ferreira outpatient    Will obtain Cardiology input

## 2019-10-24 NOTE — ASSESSMENT & PLAN NOTE
Open wound on right great toe has been present for several months  No current dressing in place  MRI in March showed concern for possible osteomyelitis - family refused amputation due to concern over patient surviving the surgery  Wound Team consult placed  Consider Podiatry consult  Echo ordered to evaluate murmur noted on physical exam

## 2019-10-24 NOTE — PLAN OF CARE
Problem: PHYSICAL THERAPY ADULT  Goal: Performs mobility at highest level of function for planned discharge setting  See evaluation for individualized goals  Description  Treatment/Interventions: LE strengthening/ROM, Therapeutic exercise, Endurance training, Cognitive reorientation, Patient/family training, Equipment eval/education, Bed mobility(PT to see when transfer training is appropriate)  Equipment Recommended: Other (Comment)(pt needs dependent means for out of bed mobilization )       See flowsheet documentation for full assessment, interventions and recommendations  Outcome: Progressing  Note:   Prognosis: Guarded  Problem List: Decreased strength, Decreased range of motion, Decreased endurance, Impaired balance, Decreased mobility, Decreased coordination, Decreased safety awareness, Decreased skin integrity  Assessment: [facility] with a temp of 101 8  Dx: severe sepsis, UTI, and right great toe open wound  order placed for PT eval and tx, w/ activity order of up and OOB as tolerated  pt presents w/ comorbidities of a-fib, right great toe wound, anemia, a-fib, cellulitis, dyslipidemia, hip fx surgery, HTN, neuropathy, OA, right shoulder pain, and SDH and personal factors of advanced age, mobilizing w/ assistive device, depression, inability to perform IADLs and inability to perform ADLs  pt presents w/ weakness, decreased ROM, decreased endurance, impaired balance, impaired coordination, decreased safety awareness, fall risk and impaired skin integrity  these impairments are evident in findings from physical examination (weakness, decreased ROM, impaired coordination and impaired skin integrity), mobility assessment (need for assist x1 to 2 w/ bed mobility, inability to mobilize out of bed, need for input for mobility and breathing technique), and Barthel Index: 5/100  pt needed input for task focus and mobility technique   pt is at risk for falls due to physical and safety awareness deficits  pt's clinical presentation is unstable/unpredictable (evident in need for assist x 1 to 2 w/ bed mobility, inability to mobilize out of bed, need for supplemental oxygen in order to maintain oxygen saturation and need for input for task focus and mobility technique/safety)  pt needs inpatient PT tx to improve mobility deficits and progress mobility training as appropriate  discharge recommendation is for outpatient PT to reduce fall risk and maximize level of functional independence  Pt needs dependent means for out of bed mobilization  Recommendation: Other (Comment)(PT at [facility])          See flowsheet documentation for full assessment

## 2019-10-24 NOTE — ASSESSMENT & PLAN NOTE
Hydrate patient gently  Monitor renal function labs in am  Pharmacy consult for vanco dosing  Eliquis dosage decreased to 2 5 bid (less than his home dosage of 5 mg bid) - viral

## 2019-10-24 NOTE — ASSESSMENT & PLAN NOTE
· UA with positive bacteria and WBC's  · Follow up on urine cultures    Continue empiric antibiotics

## 2019-10-24 NOTE — PLAN OF CARE
Problem: Potential for Falls  Goal: Patient will remain free of falls  Description  INTERVENTIONS:  - Assess patient frequently for physical needs  -  Identify cognitive and physical deficits and behaviors that affect risk of falls  -  [place] fall precautions as indicated by assessment   - Educate patient/family on patient safety including physical limitations  - Instruct patient to call for assistance with activity based on assessment  - Modify environment to reduce risk of injury  - Consider OT/PT consult to assist with strengthening/mobility  Outcome: Progressing     Problem: Prexisting or High Potential for Compromised Skin Integrity  Goal: Skin integrity is maintained or improved  Description  INTERVENTIONS:  - Identify patients at risk for skin breakdown  - Assess and monitor skin integrity  - Assess and monitor nutrition and hydration status  - Monitor labs   - Assess for incontinence   - Turn and reposition patient  - Assist with mobility/ambulation  - Relieve pressure over bony prominences  - Avoid friction and shearing  - Provide appropriate hygiene as needed including keeping skin clean and dry  - Evaluate need for skin moisturizer/barrier cream  - Collaborate with interdisciplinary team   - Patient/family teaching  - Consider wound care consult   Outcome: Progressing     Problem: Nutrition/Hydration-ADULT  Goal: Nutrient/Hydration intake appropriate for improving, restoring or maintaining nutritional needs  Description  Monitor and assess patient's nutrition/hydration status for malnutrition  Collaborate with interdisciplinary team and initiate plan and interventions as ordered  Monitor patient's weight and dietary intake as ordered or per policy  Utilize nutrition screening tool and intervene as necessary  Determine patient's food preferences and provide high-protein, high-caloric foods as appropriate       INTERVENTIONS:  - Monitor oral intake, urinary output, labs, and treatment plans  - Assess nutrition and hydration status and recommend course of action  - Evaluate amount of meals eaten  - Assist patient with eating if necessary   - Allow adequate time for meals  - Recommend/ encourage appropriate diets, oral nutritional supplements, and vitamin/mineral supplements  - Order, calculate, and assess calorie counts as needed  - Recommend, monitor, and adjust tube feedings and TPN/PPN based on assessed needs  - Assess need for intravenous fluids  - Provide specific nutrition/hydration education as appropriate  - Include patient/family/caregiver in decisions related to nutrition  Outcome: Progressing     Problem: PAIN - ADULT  Goal: Verbalizes/displays adequate comfort level or baseline comfort level  Description  Interventions:  - Encourage patient to monitor pain and request assistance  - Assess pain using appropriate pain scale  - Administer analgesics based on type and severity of pain and evaluate response  - Implement non-pharmacological measures as appropriate and evaluate response  - Consider cultural and social influences on pain and pain management  - Notify physician/advanced practitioner if interventions unsuccessful or patient reports new pain  Outcome: Progressing     Problem: INFECTION - ADULT  Goal: Absence or prevention of progression during hospitalization  Description  INTERVENTIONS:  - Assess and monitor for signs and symptoms of infection  - Monitor lab/diagnostic results  - Monitor all insertion sites, i e  indwelling lines, tubes, and drains  - Monitor endotracheal if appropriate and nasal secretions for changes in amount and color  - [place] appropriate cooling/warming therapies per order  - Administer medications as ordered  - Instruct and encourage patient and family to use good hand hygiene technique  - Identify and instruct in appropriate isolation precautions for identified infection/condition  Outcome: Progressing  Goal: Absence of fever/infection during neutropenic period  Description  INTERVENTIONS:  - Monitor WBC    Outcome: Progressing     Problem: SAFETY ADULT  Goal: Maintain or return to baseline ADL function  Description  INTERVENTIONS:  -  Assess patient's ability to carry out ADLs; assess patient's baseline for ADL function and identify physical deficits which impact ability to perform ADLs (bathing, care of mouth/teeth, toileting, grooming, dressing, etc )  - Assess/evaluate cause of self-care deficits   - Assess range of motion  - Assess patient's mobility; develop plan if impaired  - Assess patient's need for assistive devices and provide as appropriate  - Encourage maximum independence but intervene and supervise when necessary  - Involve family in performance of ADLs  - Assess for home care needs following discharge   - Consider OT consult to assist with ADL evaluation and planning for discharge  - Provide patient education as appropriate  Outcome: Progressing  Goal: Maintain or return mobility status to optimal level  Description  INTERVENTIONS:  - Assess patient's baseline mobility status (ambulation, transfers, stairs, etc )    - Identify cognitive and physical deficits and behaviors that affect mobility  - Identify mobility aids required to assist with transfers and/or ambulation (gait belt, sit-to-stand, lift, walker, cane, etc )  - [place] fall precautions as indicated by assessment  - Record patient progress and toleration of activity level on Mobility SBAR; progress patient to next Phase/Stage  - Instruct patient to call for assistance with activity based on assessment  - Consider rehabilitation consult to assist with strengthening/weightbearing, etc   Outcome: Progressing     Problem: DISCHARGE PLANNING  Goal: Discharge to home or other facility with appropriate resources  Description  INTERVENTIONS:  - Identify barriers to discharge w/patient and caregiver  - Arrange for needed discharge resources and transportation as appropriate  - Identify discharge learning needs (meds, wound care, etc )  - Arrange for interpretive services to assist at discharge as needed  - Refer to Case Management Department for coordinating discharge planning if the patient needs post-hospital services based on physician/advanced practitioner order or complex needs related to functional status, cognitive ability, or social support system  Outcome: Progressing     Problem: Knowledge Deficit  Goal: Patient/family/caregiver demonstrates understanding of disease process, treatment plan, medications, and discharge instructions  Description  Complete learning assessment and assess knowledge base    Interventions:  - Provide teaching at level of understanding  - Provide teaching via preferred learning methods  Outcome: Progressing

## 2019-10-24 NOTE — CONSULTS
Consult - Podiatry   Aravind Son 80 y o  male MRN: [id number]  Unit/Bed#: -01 Encounter: [id number]    Assessment/Plan     Assessment:  1  Right great toe chronic osteomyelitis  2  Peripheral arterial disease    Plan:  1  The toes currently stable  Patient's daughter is adamant that he not undergo any intervention for the digit  In light of that I did not order any x-ray further injury imaging as she stated she would never agree to any surgical care for the infection  Patient has also been on suppressive doxycycline since March  Although I do not think it is safe for him to be on oral antibiotics this long patient started feels this is what is best for her father and adamant that this plan continue  In my opinion the patient does not need any antibiotic for his toe  2  For now the toe is dry eschar  Keep the toe clean and free of any trauma  I have no weight-bearing restrictions  I tried to have a xavi discussion with the patient's daughter about the possibilities of chronic osteomyelitis flaring causing patient to become sick but the daughter would not have this discussion with me and just kept stating "youre not touching his toe   Patient's nurse was in the room with me in did witness this conversation  Although the patient's toe is stable I cannot guarantee that it will not make him sick in the future  The plan for now is palliative care  Keep the toe clean and dry  Please call me if any further concerns arise  History of Present Illness     HPI:  Aravind Son is a 80 y o  male who presents with severe sepsis  I was asked to evaluate patient's lower extremities  Patient was initially evaluated in March of 2019 with a draining infected right great toe  At that time an MRI did show osteomyelitis and septic joint  The recommendation at that time was amputation of the digit but patient and his daughter refused  I have not seen the patient since that interaction        According to the patient's daughter he has been on suppressive doxycycline since that time by another physician  The toe was remains stable and dry  Patient's daughter states she would never agreed any amputation and feels that as long as she keeps her father on antibiotics the infection can get any worse  Inpatient consult to Podiatry  Consult performed by: Dylan Alfred DPM  Consult ordered by: Cortez Lau MD        Review of Systems   Unable to obtain due to patient currently sleeping  History is provided by patient's daughter  Historical Information   Past Medical History:   Diagnosis Date    Actinic keratosis     Anemia     chronic hemolytic anemia    Atrial fibrillation (HCC)     Benign prostatic hypertrophy     Blood in urine     Cellulitis     Dermatitis     Disease of thyroid gland     Dyslipidemia     Epidural hematoma (HCC)     Epistaxis     Falling     Folate deficiency     GERD (gastroesophageal reflux disease)     Hip fracture (HCC)     Hyperlipidemia     Hypertension     Hypothyroidism (acquired)     Insomnia     Neuropathy     Nocturia     Osteoarthritis     Psychiatric disorder     depression    Renal disorder     Acute renal failure    Sepsis (HCC)     Shoulder pain, right     Squamous cell carcinoma     Subdural hematoma (HCC)     Thrombophlebitis     Urinary retention      Past Surgical History:   Procedure Laterality Date    HIP SURGERY       Social History   Social History     Substance and Sexual Activity   Alcohol Use No    Comment: once a week     Social History     Substance and Sexual Activity   Drug Use No     Social History     Tobacco Use   Smoking Status Former Smoker    Last attempt to quit: 1976    Years since quittin 7   Smokeless Tobacco Former User     Family History: History reviewed  No pertinent family history      Meds/Allergies   Medications Prior to Admission   Medication    acetaminophen (TYLENOL) 325 mg tablet    apixaban (ELIQUIS) 5 mg    bisacodyl (BISAC-EVAC) 10 mg suppository    Calcium Citrate 250 MG TABS    Cholecalciferol (VITAMIN D3) 2000 UNITS TABS    cyanocobalamin (VITAMIN B-12) 1,000 mcg tablet    doxycycline hyclate (VIBRAMYCIN) 100 mg capsule    finasteride (PROSCAR) 5 mg tablet    gabapentin (NEURONTIN) 100 mg capsule    levothyroxine 75 mcg tablet    magnesium hydroxide (MILK OF MAGNESIA) 400 mg/5 mL oral suspension    SB POLYETHYLENE GLYCOL 3350 PO    Sodium Phosphates (ENEMA READY-TO-USE) 7-19 GM/118ML ENEM    Tamsulosin HCl (FLOMAX PO)    traMADol (ULTRAM) 50 mg tablet     No Known Allergies    Objective   First Vitals:   Blood Pressure: 136/70 (10/23/19 1715)  Pulse: 102 (10/23/19 1715)  Temperature: (!) 101 8 °F (38 8 °C) (10/23/19 1716)  Temp Source: Axillary (10/23/19 1716)  Respirations: (!) 24 (10/23/19 1716)  Weight - Scale: 85 7 kg (188 lb 15 oz) (10/23/19 1716)  SpO2: 96 % (10/23/19 1714)    Current Vitals:   Blood Pressure: 126/82 (10/24/19 1100)  Pulse: 61 (10/24/19 1100)  Temperature: 98 °F (36 7 °C) (10/24/19 1100)  Temp Source: Oral (10/24/19 1100)  Respirations: 20 (10/24/19 1100)  Weight - Scale: 76 6 kg (168 lb 14 oz) (10/24/19 0600)  SpO2: 94 % (10/24/19 1100)        /82 (BP Location: Left arm)   Pulse 61   Temp 98 °F (36 7 °C) (Oral)   Resp 20   Wt 76 6 kg (168 lb 14 oz)   SpO2 94%   BMI 22 90 kg/m²   Physical Exam:  General:    Patient is sleeping, does not appear in acute distress  Head:     Normocephalic, without obvious abnormality, atraumatic                   Skin:   Skin on feet is warm and dry  Capillary refills brisk  There is a black eschar over the interphalangeal joint of the right great toe with no drainage cellulitis or edema  Lungs:     Respirations unlabored  Nasal cannula at 2 L   Chest wall:    No tenderness or deformity   Heart/vasc:    Regular rate and rhythm  Nonpalpable pedal pulses  Capillary refill is brisk  No cellulitis    Minimal edema  Abdomen:     Soft, non-tender, no distention           Lower MSK:   Minimal lower extremity strength  Contracted hammertoe deformities of all digits  Limited ankle range of motion or strength  Psychiatric:  Unable to assess           Neurologic:   Unable to fully assess  Gross sensation is intact to the patient's feet       Lab Results:   Admission on 10/23/2019   Component Date Value    WBC 10/23/2019 11 76*    RBC 10/23/2019 4 28     Hemoglobin 10/23/2019 11 9*    Hematocrit 10/23/2019 38 2     MCV 10/23/2019 89     MCH 10/23/2019 27 8     MCHC 10/23/2019 31 2*    RDW 10/23/2019 15 0     MPV 10/23/2019 10 0     Platelets 34/69/9581 154     nRBC 10/23/2019 0     Neutrophils Relative 10/23/2019 87*    Immat GRANS % 10/23/2019 1     Lymphocytes Relative 10/23/2019 4*    Monocytes Relative 10/23/2019 8     Eosinophils Relative 10/23/2019 0     Basophils Relative 10/23/2019 0     Neutrophils Absolute 10/23/2019 10 34*    Immature Grans Absolute 10/23/2019 0 06     Lymphocytes Absolute 10/23/2019 0 41*    Monocytes Absolute 10/23/2019 0 91     Eosinophils Absolute 10/23/2019 0 01     Basophils Absolute 10/23/2019 0 03     Sodium 10/23/2019 142     Potassium 10/23/2019 3 9     Chloride 10/23/2019 105     CO2 10/23/2019 28     ANION GAP 10/23/2019 9     BUN 10/23/2019 25     Creatinine 10/23/2019 1 53*    Glucose 10/23/2019 149*    Calcium 10/23/2019 8 8     AST 10/23/2019 14     ALT 10/23/2019 11*    Alkaline Phosphatase 10/23/2019 88     Total Protein 10/23/2019 7 0     Albumin 10/23/2019 2 9*    Total Bilirubin 10/23/2019 0 60     eGFR 10/23/2019 39     LACTIC ACID 10/23/2019 3 4*    LACTIC ACID 10/23/2019 3 4*    Procalcitonin 10/23/2019 0 16     Protime 10/23/2019 17 2*    INR 10/23/2019 1 48*    PTT 10/23/2019 35     Color, UA 10/23/2019 Yellow     Clarity, UA 10/23/2019 Clear     Specific Gravity, UA 10/23/2019 1 020     pH, UA 10/23/2019 5 5     Leukocytes, UA 10/23/2019 Small*    Nitrite, UA 10/23/2019 Negative     Protein, UA 10/23/2019 Trace*    Glucose, UA 10/23/2019 Negative     Ketones, UA 10/23/2019 Trace*    Urobilinogen, UA 10/23/2019 0 2     Bilirubin, UA 10/23/2019 Negative     Blood, UA 10/23/2019 Moderate*    NT-proBNP 10/23/2019 5,499*    RBC, UA 10/23/2019 4-10*    WBC, UA 10/23/2019 10-20*    Epithelial Cells 10/23/2019 Occasional     Bacteria, UA 10/23/2019 Moderate*    AMORPH URATES 10/23/2019 Occasional     INFLU A PCR 10/23/2019 Not Detected     INFLU B PCR 10/23/2019 Not Detected     RSV PCR 10/23/2019 Not Detected     Ventricular Rate 10/23/2019 95     Atrial Rate 10/23/2019 100     QRSD Interval 10/23/2019 86     QT Interval 10/23/2019 526     QTC Interval 10/23/2019 662     QRS Axis 10/23/2019 8     T Wave Axis 10/23/2019 65     Sodium 10/24/2019 141     Potassium 10/24/2019 4 6     Chloride 10/24/2019 107     CO2 10/24/2019 28     ANION GAP 10/24/2019 6     BUN 10/24/2019 25     Creatinine 10/24/2019 1 52*    Glucose 10/24/2019 116     Calcium 10/24/2019 8 4     eGFR 10/24/2019 40     WBC 10/24/2019 11 86*    RBC 10/24/2019 3 76*    Hemoglobin 10/24/2019 10 4*    Hematocrit 10/24/2019 34 5*    MCV 10/24/2019 92     MCH 10/24/2019 27 7     MCHC 10/24/2019 30 1*    RDW 10/24/2019 15 0     MPV 10/24/2019 10 3     Platelets 05/91/5018 144*    nRBC 10/24/2019 0     Neutrophils Relative 10/24/2019 84*    Immat GRANS % 10/24/2019 1     Lymphocytes Relative 10/24/2019 7*    Monocytes Relative 10/24/2019 8     Eosinophils Relative 10/24/2019 0     Basophils Relative 10/24/2019 0     Neutrophils Absolute 10/24/2019 10 01*    Immature Grans Absolute 10/24/2019 0 07     Lymphocytes Absolute 10/24/2019 0 82     Monocytes Absolute 10/24/2019 0 93     Eosinophils Absolute 10/24/2019 0 00     Basophils Absolute 10/24/2019 0 03     Magnesium 10/24/2019 1 8     Phosphorus 10/24/2019 4 9*    LACTIC ACID 10/24/2019 2 1*    LACTIC ACID 10/24/2019 1 2                            Imaging: I have personally reviewed pertinent films in PACS  Patient had an MRI in March of 2019 which adjusted osteomyelitis in the proximal phalanx head  There is no recent imaging  Code Status: Level 2 - DNAR: but accepts endotracheal intubation  Advance Directive and Living Will:      Power of : Yes  POLST:        Portions of the record may have been created with voice recognition software  Occasional wrong word or "sound a like" substitutions may have occurred due to the inherent limitations of voice recognition software  Read the chart carefully and recognize, using context, where substitutions have occurred

## 2019-10-24 NOTE — PROGRESS NOTES
Progress Note - Dinah Lopez 1/15/1929, 80 y o  male MRN: [id number]    Unit/Bed#: -01 Encounter: [id number]    Primary Care Provider: Krystina Garrison MD   Date and time admitted to hospital: 10/23/2019  5:06 PM    * Severe sepsis [facility])  Assessment & Plan  · POA, evidence by fever of 101 8, leukocytosis, elevated lactic acid, positive urinalysis for bacteria and WBC  · Patient presenting from his nursing facility due to fever  · He received initial gentle IV fluids on presentation due to elevated lactic acid at 3 4  · Chest x-ray showed findings concerning for fluid overload  · Lactic acidosis resolved by this morning  · He is currently on IV antibiotics with ceftriaxone and vancomycin  · He has a right great toe chronic ulcer which could be possible source of entry for infection  · Influenza A/B/RSV PCR negative  Follow up on blood and urine culture results  · Initial procalcitonin was negative  Follow up on repeat in the a m  Cardiac volume overload  Assessment & Plan  · hest x-ray on presentation showed cardiomegaly with mild central vascular congestion  · CT of the chest showed findings consistent with pulmonary edema  · His last echo in October of 2016 showed EF 60% with moderate aortic stenosis  · Echocardiogram repeated today showed LVEF 60%, severe aortic stenosis, study inadequate for evaluation of diastolic function  · Lasix 40 mg IV x1 dose given today and will observe for response  · Monitor intake and output, daily weights, 2 g salt diet  · He follows with Dr Jaswinder Harding outpatient    Open wound of right great toe  Assessment & Plan  · Chronic right great toe wound  An MRI in March showed findings concerning for osteomyelitis  Family declined amputation at that time  He has been on doxycycline 100 mg b i d  Suppressive therapy chronically  ·  No current signs of active infection    Wound appears stable, no drainage, redness, swelling or discharge  · Will obtain Podiatry evaluation    Persistent atrial fibrillation  Assessment & Plan  · Rate is controlled  He is on Eliquis for anticoagulation  Dose adjusted for renal function    Stage 3 chronic kidney disease ([facility] [address] )  Assessment & Plan  · Baseline creatinine unclear  His most recent creatinine in November of 2018 was 1 3  Suspect he is probably at his baseline currently  · He is status post IV hydration on initial presentation  Holding off on further IV fluids at this time  · Continue to monitor renal function closely, avoid hypotension, avoid nephrotoxins    Suspected UTI (urinary tract infection)  Assessment & Plan  · UA with positive bacteria and WBC's  · Follow up on urine cultures  Continue empiric antibiotics    Severe aortic stenosis  Assessment & Plan  · Echo results noted  Not a likely candidate for intervention    BPH (benign prostatic hyperplasia)  Assessment & Plan  · Continue home Flomax at 0 4 mg daily  · Continue home Proscar at 5 mg daily    VTE Pharmacologic Prophylaxis:   Pharmacologic: Apixaban (Eliquis)  Mechanical VTE Prophylaxis in Place: Yes    Patient Centered Rounds: I have performed bedside rounds with nursing staff today  Discussions with Specialists or Other Care Team Provider:  Nursing    Education and Discussions with Family / Patient:  Patient and his daughter were updated at the bedside  All questions answered  Daughter stated they still do not want any aggressive/surgical interventions for right great toe wound  Current Length of Stay: 1 day(s)    Current Patient Status: Inpatient   Certification Statement: The patient will continue to require additional inpatient hospital stay due to Above diagnosis and care plan    Discharge Plan:  Pending clinical improvement  Stable for transition of step-down level to med surg    Code Status: Level 2 - DNAR: but accepts endotracheal intubation      Subjective:   Patient seen and evaluated  Offers no complaints at this time    He denies any chest pain or shortness of breath  He denies any pain to the right foot  No fever or chills this morning  He feels overall improved compared to when he initially presented  Objective:     Vitals:   Temp (24hrs), Av 6 °F (37 °C), Min:97 4 °F (36 3 °C), Max:100 °F (37 8 °C)    Temp:  [97 4 °F (36 3 °C)-100 °F (37 8 °C)] 98 3 °F (36 8 °C)  HR:  [49-86] 78  Resp:  [18-20] 18  BP: ()/(54-82) 122/78  SpO2:  [94 %-100 %] 96 %  Body mass index is 22 9 kg/m²  Input and Output Summary (last 24 hours): Intake/Output Summary (Last 24 hours) at 10/24/2019 1936  Last data filed at 10/24/2019 1801  Gross per 24 hour   Intake 1491 67 ml   Output    Net 1491 67 ml       Physical Exam:  General Appearance:    Alert, chronically ill-appearing, no acute distress, appropriately responsive    Head:    Normocephalic    Eyes:    Conjunctiva/corneas clear   Neck:   Supple   Lungs:     Diffuse rhonchi bilaterally with basilar rales     Heart:   Irregularly irregular, S1, S2, loud holosystolic murmur   Abdomen:     Soft, non-tender, non distended   Extremities:   Chronic PVD changes to bilateral lower extremities  Right great toe dry ulcer with eschar, no surrounding edema or erythema, no drainage noted to wound   Neurologic:  Alert and oriented x3, follows commands, speech is at baseline, moves all extremities    Limited mobility at baseline         Additional Data:     Labs:    Results from last 7 days   Lab Units 10/24/19  0126   WBC Thousand/uL 11 86*   HEMOGLOBIN g/dL 10 4*   HEMATOCRIT % 34 5*   PLATELETS Thousands/uL 144*   NEUTROS PCT % 84*   LYMPHS PCT % 7*   MONOS PCT % 8   EOS PCT % 0     Results from last 7 days   Lab Units 10/24/19  0126 10/23/19  1756   POTASSIUM mmol/L 4 6 3 9   CHLORIDE mmol/L 107 105   CO2 mmol/L 28 28   BUN mg/dL 25 25   CREATININE mg/dL 1 52* 1 53*   CALCIUM mg/dL 8 4 8 8   ALK PHOS U/L  --  88   ALT U/L  --  11*   AST U/L  --  14     Results from last 7 days   Lab Units 10/23/19  7921 INR  1 48*       * I Have Reviewed All Lab Data Listed Above  * Additional Pertinent Lab Tests Reviewed: [address] Admission Reviewed    Cultures:   Blood Culture: No results found for: BLOODCX  Urine Culture:   Lab Results   Component Value Date    URINECX (A) 10/23/2019     30,000-39,000 cfu/ml Gram Negative Wei Enteric Like    URINECX No Growth <1000 cfu/mL 06/16/2016     Sputum Culture: No components found for: SPUTUMCX  Wound Culture:   Lab Results   Component Value Date    WOUNDCULT (A) 02/22/2019     2+ Growth of Methicillin Resistant Staphylococcus aureus       Last 24 Hours Medication List:     Current Facility-Administered Medications:  acetaminophen 650 mg Oral Q6H PRN Gabriel Must Pistoria, PA-C    apixaban 2 5 mg Oral BID Linda R Pistoria, PA-C    cefTRIAXone 1,000 mg Intravenous Q24H Becka Bran, PA-C    finasteride 5 mg Oral Daily Linda R Pistoria, PA-C    gabapentin 100 mg Oral HS Linda R Pistoria, PA-C    levothyroxine 75 mcg Oral Early Morning Linda R Pistoria, PA-C    tamsulosin 0 4 mg Oral Daily Linda R Pistoria, PA-C    vancomycin 20 mg/kg Intravenous Q24H Gabriel Must Pistoria, PA-C Last Rate: 1,750 mg (10/23/19 7047)        Today, Patient Was Seen By: Susanne Flood MD    ** Please Note: Dragon 360 Dictation voice to text software may have been used in the creation of this document   **

## 2019-10-24 NOTE — H&P
H&P- Loly Asai 1/15/1929, 80 y o  male MRN: [id number]    Unit/Bed#: -[facility] Encounter: [id number]    Primary Care Provider: Portillo Ken MD   Date and time admitted to hospital: 10/23/2019  5:06 PM        * Severe sepsis [facility])  Assessment & Plan  Lactic Acid 3 4 X 2- gentle hydration was attempted due to questionable fluid overload on cxr  Procal Pending  1 L IVF given with no improvement in Lactic acid   Initiate Isolyte at 75cc  hr  Urine culture positive for bacteria and WBC - will begin coverage with Rocephin  Flu swab ordered  Blood cultures pending  Pcxr with no definitive pneumonia but does appear to be fluid congested  Patient has a notable murmur on exam which is concerning for endocarditis in light of of a long standing right toe wound that has been present for several months  Echo ordered  MRSA pending - will start empiric Vancomycin due to concern for possible osteomyelitis  Pt  Had an MRI of his right great toe in March which was concerning for osteomyelitis - family refused amputation of toe at that time due to concerns that he would not survive the surgery         UTI (urinary tract infection)  Assessment & Plan  UA with positive bacteria and WBC's  Urine cultures pending   Rocephin for uti coverage    Open wound of right great toe  Assessment & Plan  Open wound on right great toe has been present for several months  No current dressing in place  MRI in March showed concern for possible osteomyelitis - family refused amputation due to concern over patient surviving the surgery  Wound Team consult placed  Consider Podiatry consult  Echo ordered to evaluate murmur noted on physical exam      BPH (benign prostatic hyperplasia)  Assessment & Plan  Continue home Flomax at 0 4 mg daily  Continue home Proscar at 5 mg daily    Hypothyroidism  Assessment & Plan  Continue home levothyroxine at 75 mcg daily    Atrial fibrillation [facility])  Assessment & Plan  Continue Eliquis at home dosage  Step down Tele  Monitor    -------------------------------------------------------------------------------------------------------------  Chief Complaint: Fever    History of Present Illness     Annie David is a 80 y o  male with a past medical history of atrial fibrillation on Eliquis , BPH, hypothyroidism and a chronic open wound on the right great toe who presented to the ER [facility] from [facility] with a temp of 101 8  He was alert and oriented on admission  His blood pressure on arrival was 136/70 and his O2 sat was between 93% and 99%  His lactic acid was elevated at 3 4 and he received 1 L IVF but there was some concern over his CXR showing fluid overload  There was no definitive PNA present on the cxr  The patient denied any abdominal pain, urinary frequency or urgency or dysuria  He denied recent headaches, nausea, vomiting, diarrhea  He states "he feels great"  He admits to eating normally and moving his bowels regularly  He was accompanied by his daughter and her significant other  The patient was noted to have some expressive aphasia which is baseline for him per his daughter  Pan cultures were obtained and he received 1 dosage of cefepime while in the ER  Pateint was admitted as a step down level 2  History obtained from the patient and his family  -------------------------------------------------------------------------------------------------------------  Dispo: Continue Stepdown Level 2 level of care     Code Status: Level 2 - DNAR: but accepts endotracheal intubation  --------------------------------------------------------------------------------------------------------------  Review of Systems    A 12-point, complete review of systems was reviewed and negative except as stated above     Physical Exam   Constitutional: No distress  HENT:   Head: Normocephalic  Right Ear: No decreased hearing is noted  Left Ear: No decreased hearing is noted     Nose: Nose normal    Mouth/Throat: Oropharynx is clear and moist and mucous membranes are normal  Abnormal dentition  Eyes: Pupils are equal, round, and reactive to light  EOM are normal    Neck: Trachea normal and normal range of motion  Neck supple  No tracheal deviation present  Cardiovascular: Normal rate, intact distal pulses and normal pulses  An irregular rhythm present  Murmur heard  Pulmonary/Chest: Effort normal and breath sounds normal  No accessory muscle usage  No tachypnea and no bradypnea  No respiratory distress  He has no wheezes  He has no rales  Abdominal: Soft  Normal appearance  Bowel sounds are decreased  Lymphadenopathy:     He has no cervical adenopathy  Neurological: He is alert  He has normal strength  No cranial nerve deficit or sensory deficit  GCS eye subscore is 4  GCS verbal subscore is 5  GCS motor subscore is 6  Skin: Skin is warm and dry  Capillary refill takes 2 to 3 seconds  He is not diaphoretic  Patient with an open wound on his right great toe - Slight erythema surrounding the wound  Psychiatric: He has a normal mood and affect   His behavior is normal  Thought content normal      --------------------------------------------------------------------------------------------------------------  Historical Information   Past Medical History:   Diagnosis Date    Actinic keratosis     Anemia     chronic hemolytic anemia    Atrial fibrillation (HCC)     Benign prostatic hypertrophy     Blood in urine     Cellulitis     Dermatitis     Disease of thyroid gland     Dyslipidemia     Epidural hematoma (HCC)     Epistaxis     Falling     Folate deficiency     GERD (gastroesophageal reflux disease)     Hip fracture (HCC)     Hyperlipidemia     Hypertension     Hypothyroidism (acquired)     Insomnia     Neuropathy     Nocturia     Osteoarthritis     Psychiatric disorder     depression    Renal disorder     Acute renal failure    Sepsis (HCC)     Shoulder pain, right     Squamous cell carcinoma     Subdural hematoma (HCC)     Thrombophlebitis     Urinary retention      Past Surgical History:   Procedure Laterality Date    HIP SURGERY       Social History   Social History     Substance and Sexual Activity   Alcohol Use No    Comment: once a week     Social History     Substance and Sexual Activity   Drug Use No     Social History     Tobacco Use   Smoking Status Former Smoker    Last attempt to quit: 1976    Years since quittin 7   Smokeless Tobacco Former User       Family History: History reviewed  No pertinent family history  Vitals:   Vitals:    10/23/19 2030 10/23/19 2100 10/23/19 2105 10/23/19 2130   BP: 101/74 121/67  106/58   BP Location: Left arm Left arm  Left arm   Pulse: 86 84  84   Resp: 18 18  18   Temp:   100 °F (37 8 °C) 98 5 °F (36 9 °C)   TempSrc:   Oral Oral   SpO2: 97% 97%  96%   Weight:    86 1 kg (189 lb 13 1 oz)     Temp  Min: 98 5 °F (36 9 °C)  Max: 101 8 °F (38 8 °C)  IBW: -88 kg     Body mass index is 25 74 kg/m²  Medications:  Current Facility-Administered Medications   Medication Dose Route Frequency    acetaminophen (TYLENOL) tablet 650 mg  650 mg Oral Q6H PRN    apixaban (ELIQUIS) tablet 2 5 mg  2 5 mg Oral BID    cefTRIAXone (ROCEPHIN) 1,000 mg in dextrose 5 % 50 mL IVPB  1,000 mg Intravenous Q24H    [START ON 10/24/2019] finasteride (PROSCAR) tablet 5 mg  5 mg Oral Daily    gabapentin (NEURONTIN) capsule 100 mg  100 mg Oral HS    [START ON 10/24/2019] levothyroxine tablet 75 mcg  75 mcg Oral Early Morning    multi-electrolyte (PLASMALYTE-A/ISOLYTE-S PH 7 4) IV solution  75 mL/hr Intravenous Continuous    [START ON 10/24/2019] tamsulosin (FLOMAX) capsule 0 4 mg  0 4 mg Oral Daily    vancomycin (VANCOCIN) 1,250 mg in sodium chloride 0 9 % 250 mL IVPB  15 mg/kg Intravenous Q24H     Home medications:  Prior to Admission Medications   Prescriptions Last Dose Informant Patient Reported? Taking?    Calcium Citrate 250 MG TABS  Outside Facility (Specify) Yes Yes   Sig: Take 4 tablets by mouth daily   Cholecalciferol (VITAMIN D3) 2000 UNITS TABS  Outside Facility (Specify) Yes Yes   Sig: Take 2,000 Units by mouth daily  SB POLYETHYLENE GLYCOL 3350 PO  Outside Facility (Specify) Yes Yes   Sig: Take by mouth as needed   Sodium Phosphates (ENEMA READY-TO-USE) 7-19 GM/118ML ENEM  Outside Facility (Specify) Yes Yes   Sig: Insert into the rectum daily as needed   Tamsulosin HCl (FLOMAX PO)  Outside Facility (Specify) Yes Yes   Sig: Take 0 4 mg by mouth daily     acetaminophen (TYLENOL) 325 mg tablet  Outside Facility (Specify) No Yes   Sig: Take 2 tablets by mouth every 6 (six) hours as needed for mild pain   Patient taking differently: Take 650 mg by mouth 3 (three) times a day     apixaban (ELIQUIS) 5 mg  Outside Facility (Specify) Yes Yes   Sig: Take 5 mg by mouth 2 (two) times a day   bisacodyl (BISAC-EVAC) 10 mg suppository  Outside Facility (Specify) Yes Yes   Sig: Insert 10 mg into the rectum daily   cyanocobalamin (VITAMIN B-12) 1,000 mcg tablet  Outside Facility (Specify) Yes Yes   Sig: Take 1,000 mcg by mouth daily  doxycycline hyclate (VIBRAMYCIN) 100 mg capsule  Outside Facility (Specify) Yes Yes   Sig: Take 100 mg by mouth every 12 (twelve) hours   finasteride (PROSCAR) 5 mg tablet  Outside Facility (Specify) Yes Yes   Sig: Take 5 mg by mouth daily     gabapentin (NEURONTIN) 100 mg capsule  Outside Facility (Specify) No Yes   Sig: Take 1 capsule by mouth daily at bedtime   levothyroxine 75 mcg tablet  Outside Facility (Specify) Yes Yes   Sig: Take 75 mcg by mouth daily    magnesium hydroxide (MILK OF MAGNESIA) 400 mg/5 mL oral suspension  Outside Facility (Specify) Yes Yes   Sig: Take 30 mL by mouth daily as needed for constipation   traMADol (ULTRAM) 50 mg tablet  Outside Facility (Specify) Yes Yes   Sig: Take 25 mg by mouth every 6 (six) hours as needed for moderate pain      Facility-Administered Medications: None     Allergies:  No Known Allergies      Laboratory and Diagnostics:  Results from last 7 days   Lab Units 10/23/19  1756   WBC Thousand/uL 11 76*   HEMOGLOBIN g/dL 11 9*   HEMATOCRIT % 38 2   PLATELETS Thousands/uL 154   NEUTROS PCT % 87*   MONOS PCT % 8     Results from last 7 days   Lab Units 10/23/19  1756   SODIUM mmol/L 142   POTASSIUM mmol/L 3 9   CHLORIDE mmol/L 105   CO2 mmol/L 28   ANION GAP mmol/L 9   BUN mg/dL 25   CREATININE mg/dL 1 53*   CALCIUM mg/dL 8 8   GLUCOSE RANDOM mg/dL 149*   ALT U/L 11*   AST U/L 14   ALK PHOS U/L 88   ALBUMIN g/dL 2 9*   TOTAL BILIRUBIN mg/dL 0 60          Results from last 7 days   Lab Units 10/23/19  1756   INR  1 48*   PTT seconds 35          Results from last 7 days   Lab Units 10/23/19  2007 10/23/19  1756   LACTIC ACID mmol/L 3 4* 3 4*       Micro: Blood cultures, urine cultures pending  UA with bacteria and WBC - antibiotics initiated  EKG: Telemetry reviewed - atrial fibrillation- rate controlled  XR chest pa & lateral   ED Interpretation   Cardiomegaly  Increased interstitial markings  No discrete infiltrates  Final Result      Cardiomegaly and mild central vascular congestion            Workstation performed: [id number]         CT chest without contrast    (Results Pending)     Imaging: I have personally reviewed pertinent reports  and I have personally reviewed pertinent films in PACS    ------------------------------------------------------------------------------------------------------------  Power of : Yes  ------------------------------------------------------------------------------------------------------------  Anticipated Length of Stay is > 2 midnights    Counseling / Coordination of Care  Total time spent today 50 minutes minutes  Greater than 50% of total time was spent with the patient and / or family counseling and / or coordination of care   A description of the counseling / coordination of care: Evaluation and treatment of above medical problems  Discussed the patients condition with him and his family  All questions answered  Harjit Coleman PA-C        Portions of the record may have been created with voice recognition software  Occasional wrong word or "sound a like" substitutions may have occurred due to the inherent limitations of voice recognition software    Read the chart carefully and recognize, using context, where substitutions have occurred

## 2019-10-24 NOTE — PLAN OF CARE
Problem: PHYSICAL THERAPY ADULT  Goal: Performs mobility at highest level of function for planned discharge setting  See evaluation for individualized goals  Description  Treatment/Interventions: LE strengthening/ROM, Therapeutic exercise, Endurance training, Cognitive reorientation, Patient/family training, Equipment eval/education, Bed mobility(PT to see when transfer training is appropriate)  Equipment Recommended: Other (Comment)(pt needs dependent means for out of bed mobilization )       See flowsheet documentation for full assessment, interventions and recommendations  10/24/2019 1150 by Dao Gunderson, PT  Outcome: Progressing  Note:   Prognosis: Guarded  Problem List: Decreased strength, Decreased range of motion, Decreased endurance, Impaired balance, Decreased mobility, Decreased coordination, Decreased safety awareness, Decreased skin integrity  Assessment: Pt was initiated w/ participation in exercise program to improve strength and endurance in order to promote mobility independence  Input was required to complete all reps and maintain technique  Handout was provided to expedite understanding of technique  Rest breaks were necessary secondary to fatigue  Pt needs further PT follow up to improve independence w/ completing exercise program  continued PT intervention is indicated to reduce fall risk  Recommendation: Other (Comment)(PT at [facility])          See flowsheet documentation for full assessment  10/24/2019 1147 by Dao Gunderson, PT  Outcome: Progressing  Note:   Prognosis: Guarded  Problem List: Decreased strength, Decreased range of motion, Decreased endurance, Impaired balance, Decreased mobility, Decreased coordination, Decreased safety awareness, Decreased skin integrity  Assessment: Pt presented to the ER tonight from [facility] with a temp of 101 8  Dx: severe sepsis, UTI, and right great toe open wound   order placed for PT eval and tx, w/ activity order of up and OOB as tolerated  pt presents w/ comorbidities of a-fib, right great toe wound, anemia, a-fib, cellulitis, dyslipidemia, hip fx surgery, HTN, neuropathy, OA, right shoulder pain, and SDH and personal factors of advanced age, mobilizing w/ assistive device, depression, inability to perform IADLs and inability to perform ADLs  pt presents w/ weakness, decreased ROM, decreased endurance, impaired balance, impaired coordination, decreased safety awareness, fall risk and impaired skin integrity  these impairments are evident in findings from physical examination (weakness, decreased ROM, impaired coordination and impaired skin integrity), mobility assessment (need for assist x1 to 2 w/ bed mobility, inability to mobilize out of bed, need for input for mobility and breathing technique), and Barthel Index: 5/100  pt needed input for task focus and mobility technique  pt is at risk for falls due to physical and safety awareness deficits  pt's clinical presentation is unstable/unpredictable (evident in need for assist x 1 to 2 w/ bed mobility, inability to mobilize out of bed, need for supplemental oxygen in order to maintain oxygen saturation and need for input for task focus and mobility technique/safety)  pt needs inpatient PT tx to improve mobility deficits and progress mobility training as appropriate  discharge recommendation is for outpatient PT to reduce fall risk and maximize level of functional independence  Pt needs dependent means for out of bed mobilization  Recommendation: Other (Comment)(PT at [facility])          See flowsheet documentation for full assessment

## 2019-10-24 NOTE — ASSESSMENT & PLAN NOTE
· Baseline creatinine unclear  His most recent creatinine in November of 2018 was 1 3  Suspect he is probably at his baseline currently  · He is status post IV hydration on initial presentation    Holding off on further IV fluids at this time  · Continue to monitor renal function closely, avoid hypotension, avoid nephrotoxins

## 2019-10-24 NOTE — ASSESSMENT & PLAN NOTE
· Chronic right great toe wound  An MRI in March showed findings concerning for osteomyelitis  Family declined amputation at that time  He has been on doxycycline 100 mg b i d  Suppressive therapy chronically  ·  No current signs of active infection    Wound appears stable, no drainage, redness, swelling or discharge  · Will obtain Podiatry evaluation

## 2019-10-24 NOTE — PHYSICAL THERAPY NOTE
PHYSICAL THERAPY TREATMENT NOTE    Patient Name: Gregg Staton  TPKAJ'L Date: 10/24/2019     10/24/19 1029   Pain Assessment   Pain Assessment No/denies pain   Restrictions/Precautions   Other Precautions Contact/isolation;Droplet precautions; Bed Alarm;Multiple lines;Telemetry;O2;Fall Risk   General   Chart Reviewed Yes   Family/Caregiver Present No   Cognition   Arousal/Participation Cooperative   Attention Attends with cues to redirect   Orientation Level Oriented to person; Other (Comment)  (pt was identified w/ full name, birth date)   Following Commands Follows one step commands inconsistently   Subjective   Subjective pt agreed to participate in PT intervention  Activity Tolerance   Activity Tolerance Patient limited by fatigue   Nurse Made Aware spoke to Dr Leila Patterson NSG, [facility]   Equipment Use   Comments ankle pumps 20  quad sets, heel slides, and supine hip abduction 10 each  Assessment   Prognosis Guarded   Problem List Decreased strength;Decreased range of motion;Decreased endurance; Impaired balance;Decreased mobility; Decreased coordination;Decreased safety awareness;Decreased skin integrity   Assessment Pt was initiated w/ participation in exercise program to improve strength and endurance in order to promote mobility independence  Input was required to complete all reps and maintain technique  Handout was provided to expedite understanding of technique  Rest breaks were necessary secondary to fatigue  Pt needs further PT follow up to improve independence w/ completing exercise program  continued PT intervention is indicated to reduce fall risk  Goals   Patient Goals go home   STG Expiration Date 11/03/19   Short Term Goal #1 pt will:  Increase bilateral LE strength 1/2 grade to facilitate independent mobility, Perform rolling and repositioning in bed w/ minx1 to decrease fall risk factors, Perform supine <---> sit transitions w/ modx1 to improve independence, Increase static sitting balance 1 grade to decrease risk for falls, Tolerate 3 hr OOB to faciliate upright tolerance, Tolerate seated at EOB 30 minutes w/ supervision to facilitate functional task performance and Improve Barthel Index score to 30 or greater to facilitate independence  PT to see when transfer training is appropriate  PT Treatment Day 1   Plan   Treatment/Interventions LE strengthening/ROM; Therapeutic exercise; Endurance training;Cognitive reorientation;Patient/family training;Equipment eval/education; Bed mobility  (PT to see when transfer training is appropriate )   Progress Slow progress, decreased activity tolerance   PT Frequency 2-3x/wk   Recommendation   Recommendation Other (Comment)  (PT at SNF)   Equipment Recommended Other (Comment)  (pt needs dependent means for out of bed mobilization )     Skilled inpatient PT recommended while in hospital to progress pt toward treatment goals      Sapphire Shearer, PT

## 2019-10-25 PROBLEM — L97.519 CHRONIC ULCER OF GREAT TOE OF RIGHT FOOT (HCC): Status: ACTIVE | Noted: 2019-10-23

## 2019-10-25 LAB
ANION GAP SERPL CALCULATED.3IONS-SCNC: 9 MMOL/L (ref 4–13)
BACTERIA UR CULT: ABNORMAL
BASOPHILS # BLD AUTO: 0.02 THOUSANDS/ΜL (ref 0–0.1)
BASOPHILS NFR BLD AUTO: 0 % (ref 0–1)
BUN SERPL-MCNC: 21 MG/DL (ref 5–25)
CALCIUM SERPL-MCNC: 8.6 MG/DL (ref 8.3–10.1)
CHLORIDE SERPL-SCNC: 109 MMOL/L (ref 100–108)
CO2 SERPL-SCNC: 28 MMOL/L (ref 21–32)
CREAT SERPL-MCNC: 1.26 MG/DL (ref 0.6–1.3)
EOSINOPHIL # BLD AUTO: 0.12 THOUSAND/ΜL (ref 0–0.61)
EOSINOPHIL NFR BLD AUTO: 2 % (ref 0–6)
ERYTHROCYTE [DISTWIDTH] IN BLOOD BY AUTOMATED COUNT: 14.7 % (ref 11.6–15.1)
GFR SERPL CREATININE-BSD FRML MDRD: 50 ML/MIN/1.73SQ M
GLUCOSE SERPL-MCNC: 97 MG/DL (ref 65–140)
HCT VFR BLD AUTO: 36.7 % (ref 36.5–49.3)
HGB BLD-MCNC: 11.1 G/DL (ref 12–17)
IMM GRANULOCYTES # BLD AUTO: 0.01 THOUSAND/UL (ref 0–0.2)
IMM GRANULOCYTES NFR BLD AUTO: 0 % (ref 0–2)
LYMPHOCYTES # BLD AUTO: 0.63 THOUSANDS/ΜL (ref 0.6–4.47)
LYMPHOCYTES NFR BLD AUTO: 9 % (ref 14–44)
MCH RBC QN AUTO: 27.2 PG (ref 26.8–34.3)
MCHC RBC AUTO-ENTMCNC: 30.2 G/DL (ref 31.4–37.4)
MCV RBC AUTO: 90 FL (ref 82–98)
MONOCYTES # BLD AUTO: 0.58 THOUSAND/ΜL (ref 0.17–1.22)
MONOCYTES NFR BLD AUTO: 9 % (ref 4–12)
MRSA NOSE QL CULT: NORMAL
NEUTROPHILS # BLD AUTO: 5.32 THOUSANDS/ΜL (ref 1.85–7.62)
NEUTS SEG NFR BLD AUTO: 80 % (ref 43–75)
NRBC BLD AUTO-RTO: 0 /100 WBCS
PLATELET # BLD AUTO: 136 THOUSANDS/UL (ref 149–390)
PMV BLD AUTO: 9.8 FL (ref 8.9–12.7)
POTASSIUM SERPL-SCNC: 3.9 MMOL/L (ref 3.5–5.3)
PROCALCITONIN SERPL-MCNC: 0.86 NG/ML
RBC # BLD AUTO: 4.08 MILLION/UL (ref 3.88–5.62)
SODIUM SERPL-SCNC: 146 MMOL/L (ref 136–145)
WBC # BLD AUTO: 6.68 THOUSAND/UL (ref 4.31–10.16)

## 2019-10-25 PROCEDURE — 84145 PROCALCITONIN (PCT): CPT | Performed by: INTERNAL MEDICINE

## 2019-10-25 PROCEDURE — 85025 COMPLETE CBC W/AUTO DIFF WBC: CPT | Performed by: INTERNAL MEDICINE

## 2019-10-25 PROCEDURE — 80048 BASIC METABOLIC PNL TOTAL CA: CPT | Performed by: INTERNAL MEDICINE

## 2019-10-25 PROCEDURE — 99232 SBSQ HOSP IP/OBS MODERATE 35: CPT | Performed by: INTERNAL MEDICINE

## 2019-10-25 RX ADMIN — LEVOTHYROXINE SODIUM 75 MCG: 75 TABLET ORAL at 05:36

## 2019-10-25 RX ADMIN — CEFTRIAXONE SODIUM 1000 MG: 10 INJECTION, POWDER, FOR SOLUTION INTRAVENOUS at 18:16

## 2019-10-25 RX ADMIN — TAMSULOSIN HYDROCHLORIDE 0.4 MG: 0.4 CAPSULE ORAL at 10:24

## 2019-10-25 RX ADMIN — FINASTERIDE 5 MG: 5 TABLET, FILM COATED ORAL at 10:24

## 2019-10-25 RX ADMIN — APIXABAN 2.5 MG: 2.5 TABLET, FILM COATED ORAL at 10:24

## 2019-10-25 RX ADMIN — GABAPENTIN 100 MG: 100 CAPSULE ORAL at 22:29

## 2019-10-25 RX ADMIN — VANCOMYCIN HYDROCHLORIDE 1750 MG: 1 INJECTION, POWDER, LYOPHILIZED, FOR SOLUTION INTRAVENOUS at 23:23

## 2019-10-25 RX ADMIN — APIXABAN 2.5 MG: 2.5 TABLET, FILM COATED ORAL at 18:16

## 2019-10-25 NOTE — SOCIAL WORK
LOS 2  Patient is not a bundle  Patient is not a readmission  CM met with patient at bedside  Patient is alert and pleasant  Patient reported that he lives at [facility]  Patient uses a WC primarily and performs pivot transfers w/ assist x2 or mobilized with mechanical lift  Patient needs assist w/ ADLs  No reported falls in last 6 months  CM asked if it was okay to contact his daughter and patient was agreeable  CM called and spoke with patient's daughter Carry   She would like for patient to return to [facility] when medically stable  She did ask if he would be returning to the skilled rehab side or his room  CM let her know that [facility] would make that determination since he was a long term care resident  Daughter vocalized that she would like for him to get some physical therapy upon his return  CM did notify [facility] of this request     IMM was reviewed with patient's daughter over the phone  CM reviewed discharge planning process including the following: identifying caregivers at home, preference for d/c planning needs, [facility] Meds to Bed program, availability of treatment team to discuss questions or concerns patient and/or family may have regarding diagnosis, plan of care, old or new medications and discharge planning  CM name and role reviewed  CM will continue to follow for care coordination and update assessment as necessary

## 2019-10-25 NOTE — PROGRESS NOTES
Vancomycin IV Pharmacy-to-Dose Consultation    Rodger Francisco is a 80 y o  male who is currently receiving Vancomycin IV with management by the Pharmacy Consult service  Assessment/Plan:  The patient was reviewed  Renal function is stable and no signs or symptoms of nephrotoxicity and/or infusion reactions were documented in the chart  Based on todays assessment, continue current vancomycin (day # 3) dosing of 1750 mg IV q24h, with a plan for trough to be drawn at 2230  on 10/26    We will continue to follow the patients culture results and clinical progress daily      Maribell Dacosta, Pharmacist

## 2019-10-25 NOTE — ASSESSMENT & PLAN NOTE
· POA, evidence by fever of 101 8, leukocytosis, elevated lactic acid, positive urinalysis for bacteria and WBC  · Leukocytosis is now resolved, lactic acid normalized  Procalcitonin trended up and he had 1/2 positive cultures for GPC in clusters  · He remains on IV antibiotics with ceftriaxone and vancomycin  · He has a right great toe chronic ulcer as well as left forearm skin lesion which was recently biopsied and could be possible source of entry for infection  Also possibly secondary to UTI although cultures only grew "30,000-39,000 cfu/ml" gram-negative rods  · Influenza A/B/RSV PCR negative    Will await final blood culture results as this could possibly be skin contaminant if it turns out to be coagulase-negative Staph  · Repeat procalcitonin in the morning

## 2019-10-25 NOTE — MALNUTRITION/BMI
This medical record reflects one or more clinical indicators suggestive of malnutrition and/or morbid obesity  Malnutrition Findings:   Malnutrition type: Chronic illness(acute illness in the setting of chronic illness, advanced age)  Degree of Malnutrition: Malnutrition of mild degree(related to inadequate oral intake)  Malnutrition Characteristics: Fat loss, Muscle loss, Weight loss(as evidenced temporal indentation, 12 1% weight decrease x 1 year  Currently being treated with oral supplementation, nutrition education  )    BMI Findings: Body mass index is 21 77 kg/m²  See Nutrition note dated 10/25/19  for additional details  Completed nutrition assessment is viewable in the nutrition documentation

## 2019-10-25 NOTE — ASSESSMENT & PLAN NOTE
· Chronic right great toe ulcer  An MRI in March showed findings concerning for osteomyelitis  Family declined amputation at that time  He has been on doxycycline 100 mg b i d  Suppressive therapy chronically  ·  No current signs of active infection    Wound appears stable, no drainage, redness, swelling or discharge  · No further workup or interventions at this time

## 2019-10-25 NOTE — ASSESSMENT & PLAN NOTE
· Echo results noted  Not a candidate for intervention  · He follows outpatient with Dr Rita Martin  Per discussions with patient's daughter, he has been told no interventions

## 2019-10-25 NOTE — PLAN OF CARE
Problem: Potential for Falls  Goal: Patient will remain free of falls  Description  INTERVENTIONS:  - Assess patient frequently for physical needs  -  Identify cognitive and physical deficits and behaviors that affect risk of falls  -  [place] fall precautions as indicated by assessment   - Educate patient/family on patient safety including physical limitations  - Instruct patient to call for assistance with activity based on assessment  - Modify environment to reduce risk of injury  - Consider OT/PT consult to assist with strengthening/mobility  Outcome: Progressing     Problem: Prexisting or High Potential for Compromised Skin Integrity  Goal: Skin integrity is maintained or improved  Description  INTERVENTIONS:  - Identify patients at risk for skin breakdown  - Assess and monitor skin integrity  - Assess and monitor nutrition and hydration status  - Monitor labs   - Assess for incontinence   - Turn and reposition patient  - Assist with mobility/ambulation  - Relieve pressure over bony prominences  - Avoid friction and shearing  - Provide appropriate hygiene as needed including keeping skin clean and dry  - Evaluate need for skin moisturizer/barrier cream  - Collaborate with interdisciplinary team   - Patient/family teaching  - Consider wound care consult   Outcome: Progressing     Problem: Nutrition/Hydration-ADULT  Goal: Nutrient/Hydration intake appropriate for improving, restoring or maintaining nutritional needs  Description  Monitor and assess patient's nutrition/hydration status for malnutrition  Collaborate with interdisciplinary team and initiate plan and interventions as ordered  Monitor patient's weight and dietary intake as ordered or per policy  Utilize nutrition screening tool and intervene as necessary  Determine patient's food preferences and provide high-protein, high-caloric foods as appropriate       INTERVENTIONS:  - Monitor oral intake, urinary output, labs, and treatment plans  - Assess nutrition and hydration status and recommend course of action  - Evaluate amount of meals eaten  - Assist patient with eating if necessary   - Allow adequate time for meals  - Recommend/ encourage appropriate diets, oral nutritional supplements, and vitamin/mineral supplements  - Order, calculate, and assess calorie counts as needed  - Recommend, monitor, and adjust tube feedings and TPN/PPN based on assessed needs  - Assess need for intravenous fluids  - Provide specific nutrition/hydration education as appropriate  - Include patient/family/caregiver in decisions related to nutrition  Outcome: Progressing     Problem: PAIN - ADULT  Goal: Verbalizes/displays adequate comfort level or baseline comfort level  Description  Interventions:  - Encourage patient to monitor pain and request assistance  - Assess pain using appropriate pain scale  - Administer analgesics based on type and severity of pain and evaluate response  - Implement non-pharmacological measures as appropriate and evaluate response  - Consider cultural and social influences on pain and pain management  - Notify physician/advanced practitioner if interventions unsuccessful or patient reports new pain  Outcome: Progressing     Problem: INFECTION - ADULT  Goal: Absence or prevention of progression during hospitalization  Description  INTERVENTIONS:  - Assess and monitor for signs and symptoms of infection  - Monitor lab/diagnostic results  - Monitor all insertion sites, i e  indwelling lines, tubes, and drains  - Monitor endotracheal if appropriate and nasal secretions for changes in amount and color  - [place] appropriate cooling/warming therapies per order  - Administer medications as ordered  - Instruct and encourage patient and family to use good hand hygiene technique  - Identify and instruct in appropriate isolation precautions for identified infection/condition  Outcome: Progressing  Goal: Absence of fever/infection during neutropenic period  Description  INTERVENTIONS:  - Monitor WBC    Outcome: Progressing     Problem: SAFETY ADULT  Goal: Maintain or return to baseline ADL function  Description  INTERVENTIONS:  -  Assess patient's ability to carry out ADLs; assess patient's baseline for ADL function and identify physical deficits which impact ability to perform ADLs (bathing, care of mouth/teeth, toileting, grooming, dressing, etc )  - Assess/evaluate cause of self-care deficits   - Assess range of motion  - Assess patient's mobility; develop plan if impaired  - Assess patient's need for assistive devices and provide as appropriate  - Encourage maximum independence but intervene and supervise when necessary  - Involve family in performance of ADLs  - Assess for home care needs following discharge   - Consider OT consult to assist with ADL evaluation and planning for discharge  - Provide patient education as appropriate  Outcome: Progressing  Goal: Maintain or return mobility status to optimal level  Description  INTERVENTIONS:  - Assess patient's baseline mobility status (ambulation, transfers, stairs, etc )    - Identify cognitive and physical deficits and behaviors that affect mobility  - Identify mobility aids required to assist with transfers and/or ambulation (gait belt, sit-to-stand, lift, walker, cane, etc )  - [place] fall precautions as indicated by assessment  - Record patient progress and toleration of activity level on Mobility SBAR; progress patient to next Phase/Stage  - Instruct patient to call for assistance with activity based on assessment  - Consider rehabilitation consult to assist with strengthening/weightbearing, etc   Outcome: Progressing     Problem: DISCHARGE PLANNING  Goal: Discharge to home or other facility with appropriate resources  Description  INTERVENTIONS:  - Identify barriers to discharge w/patient and caregiver  - Arrange for needed discharge resources and transportation as appropriate  - Identify discharge learning needs (meds, wound care, etc )  - Arrange for interpretive services to assist at discharge as needed  - Refer to Case Management Department for coordinating discharge planning if the patient needs post-hospital services based on physician/advanced practitioner order or complex needs related to functional status, cognitive ability, or social support system  Outcome: Progressing     Problem: Knowledge Deficit  Goal: Patient/family/caregiver demonstrates understanding of disease process, treatment plan, medications, and discharge instructions  Description  Complete learning assessment and assess knowledge base    Interventions:  - Provide teaching at level of understanding  - Provide teaching via preferred learning methods  Outcome: Progressing

## 2019-10-25 NOTE — ASSESSMENT & PLAN NOTE
· Baseline creatinine unclear  His most recent creatinine in November of 2018 was 1 3    Creatinine today 1 26 status post 1 dose IV Lasix yesterday  · Hold off on further diuretics or IV fluids  · Continue to monitor renal function closely, avoid hypotension, avoid nephrotoxins

## 2019-10-25 NOTE — ASSESSMENT & PLAN NOTE
· Chest x-ray on presentation showed cardiomegaly with mild central vascular congestion  · CT of the chest showed findings consistent with pulmonary edema  · His last echo in October of 2016 showed EF 60% with moderate aortic stenosis  · Echocardiogram repeated today showed LVEF 60%, severe aortic stenosis, study inadequate for evaluation of diastolic function  · Lasix 40 mg IV x1 dose given yesterday  Will hold off on further diuretics at this time  · Monitor intake and output, daily weights, 2 g salt diet  · He follows with Dr Isidra Restrepo outpatient    Not a candidate for intervention and is being managed conservatively

## 2019-10-25 NOTE — PROGRESS NOTES
Progress Note - Susie Krishnan 1/15/1929, 80 y o  male MRN: [id number]    Unit/Bed#: -[facility] Encounter: [id number]    Primary Care Provider: Claudia Stoll MD   Date and time admitted to hospital: 10/23/2019  5:06 PM    * Severe sepsis [facility])  Assessment & Plan  · POA, evidence by fever of 101 8, leukocytosis, elevated lactic acid, positive urinalysis for bacteria and WBC  · Leukocytosis is now resolved, lactic acid normalized  Procalcitonin trended up and he had 1/2 positive cultures for GPC in clusters  · He remains on IV antibiotics with ceftriaxone and vancomycin  · He has a right great toe chronic ulcer as well as left forearm skin lesion which was recently biopsied and could be possible source of entry for infection  Also possibly secondary to UTI although cultures only grew "30,000-39,000 cfu/ml" gram-negative rods  · Influenza A/B/RSV PCR negative  Will await final blood culture results as this could possibly be skin contaminant if it turns out to be coagulase-negative Staph  · Repeat procalcitonin in the morning    Cardiac volume overload  Assessment & Plan  · Chest x-ray on presentation showed cardiomegaly with mild central vascular congestion  · CT of the chest showed findings consistent with pulmonary edema  · His last echo in October of 2016 showed EF 60% with moderate aortic stenosis  · Echocardiogram repeated today showed LVEF 60%, severe aortic stenosis, study inadequate for evaluation of diastolic function  · Lasix 40 mg IV x1 dose given yesterday  Will hold off on further diuretics at this time  · Monitor intake and output, daily weights, 2 g salt diet  · He follows with Dr Florinda Martniez outpatient  Not a candidate for intervention and is being managed conservatively    Chronic ulcer of great toe of right foot ([address] )  Assessment & Plan  · Chronic right great toe ulcer  An MRI in March showed findings concerning for osteomyelitis  Family declined amputation at that time    He has been on doxycycline 100 mg b i d  Suppressive therapy chronically  ·  No current signs of active infection  Wound appears stable, no drainage, redness, swelling or discharge  · No further workup or interventions at this time    Persistent atrial fibrillation  Assessment & Plan  · Rate is controlled  He is on Eliquis for anticoagulation  Dose adjusted for renal function    Stage 3 chronic kidney disease ([facility] [address] )  Assessment & Plan  · Baseline creatinine unclear  His most recent creatinine in November of 2018 was 1 3  Creatinine today 1 26 status post 1 dose IV Lasix yesterday  · Hold off on further diuretics or IV fluids  · Continue to monitor renal function closely, avoid hypotension, avoid nephrotoxins    Suspected UTI (urinary tract infection)  Assessment & Plan  · UA with positive bacteria and WBC's  · Follow up on final urine cultures  Continue empiric antibiotics    Severe aortic stenosis  Assessment & Plan  · Echo results noted  Not a candidate for intervention  · He follows outpatient with Dr Rita Martin  Per discussions with patient's daughter, he has been told no interventions  BPH (benign prostatic hyperplasia)  Assessment & Plan  · Continue home Flomax at 0 4 mg daily  · Continue home Proscar at 5 mg daily      VTE Pharmacologic Prophylaxis:   Pharmacologic: Apixaban (Eliquis)  Mechanical VTE Prophylaxis in Place: Yes    Patient Centered Rounds: I have performed bedside rounds with nursing staff today      Discussions with Specialists or Other Care Team Provider:  Nursing    Education and Discussions with Family / Patient:  Patient/daughter updated at the bedside, all questions answered    Current Length of Stay: 2 day(s)    Current Patient Status: Inpatient   Certification Statement: The patient will continue to require additional inpatient hospital stay due to Above diagnosis and care plan    Discharge Plan:  Pending clinical improvement    Code Status: Level 2 - DNAR: but accepts endotracheal intubation      Subjective:   Seen and evaluated, no acute overnight events  Denies any new complaints  No further febrile episodes  Objective:     Vitals:   Temp (24hrs), Av 3 °F (36 8 °C), Min:98 2 °F (36 8 °C), Max:98 3 °F (36 8 °C)    Temp:  [98 2 °F (36 8 °C)-98 3 °F (36 8 °C)] 98 3 °F (36 8 °C)  HR:  [64-78] 73  Resp:  [18] 18  BP: (122-178)/(78-98) 178/98  SpO2:  [94 %-97 %] 97 %  Body mass index is 21 77 kg/m²  Input and Output Summary (last 24 hours): Intake/Output Summary (Last 24 hours) at 10/25/2019 1256  Last data filed at 10/25/2019 1021  Gross per 24 hour   Intake 300 ml   Output 200 ml   Net 100 ml       Physical Exam:  General Appearance:    Alert, chronically ill-appearing, no acute distress, no distress, appropriately responsive    Head:    Normocephalic, mucous membranes moist   Eyes:    Conjunctiva/corneas clear   Neck:   Supple   Lungs:     Decreased breath sounds bilaterally with scattered rhonchi, few basilar rales     Heart:    Regular rate and rhythm, S1 and S2, +SM    Abdomen:     Soft, non-tender, bowel sounds active all four quadrants,     no masses, no organomegaly   Extremities:   Stable right great toe ulcer with dry eschar, no erythema, no swelling, no drainage    No lower extremity edema   Neurologic:  Alert and oriented x3, moves all extremities, limited mobility at baseline         Additional Data:     Labs:    Results from last 7 days   Lab Units 10/25/19  0552   WBC Thousand/uL 6 68   HEMOGLOBIN g/dL 11 1*   HEMATOCRIT % 36 7   PLATELETS Thousands/uL 136*   NEUTROS PCT % 80*   LYMPHS PCT % 9*   MONOS PCT % 9   EOS PCT % 2     Results from last 7 days   Lab Units 10/25/19  0552  10/23/19  1756   POTASSIUM mmol/L 3 9   < > 3 9   CHLORIDE mmol/L 109*   < > 105   CO2 mmol/L 28   < > 28   BUN mg/dL 21   < > 25   CREATININE mg/dL 1 26   < > 1 53*   CALCIUM mg/dL 8 6   < > 8 8   ALK PHOS U/L  --   --  88   ALT U/L  --   --  11*   AST U/L  --   --  14    < > = values in this interval not displayed  Results from last 7 days   Lab Units 10/23/19  1756   INR  1 48*       * I Have Reviewed All Lab Data Listed Above  * Additional Pertinent Lab Tests Reviewed: Patricia [facility] Admission Reviewed    Cultures:   Blood Culture:   Lab Results   Component Value Date    BLOODCX No Growth at 24 hrs  10/23/2019     Urine Culture:   Lab Results   Component Value Date    URINECX (A) 10/23/2019     30,000-39,000 cfu/ml Gram Negative Wei Enteric Like    URINECX No Growth <1000 cfu/mL 06/16/2016     Sputum Culture: No components found for: SPUTUMCX  Wound Culture:   Lab Results   Component Value Date    WOUNDCULT (A) 02/22/2019     2+ Growth of Methicillin Resistant Staphylococcus aureus       Last 24 Hours Medication List:     Current Facility-Administered Medications:  acetaminophen 650 mg Oral Q6H PRN Donzella Carmelo Pistoria, PA-C    apixaban 2 5 mg Oral BID Donzella Carmelo Pistoria, PA-C    cefTRIAXone 1,000 mg Intravenous Q24H JESSICA Granados-JEF Last Rate: 1,000 mg (10/24/19 1856)   finasteride 5 mg Oral Daily Linda R Pistoria, PA-C    gabapentin 100 mg Oral HS Linda R Pistoria, PA-C    levothyroxine 75 mcg Oral Early Morning Linda R Pistoria, PA-C    tamsulosin 0 4 mg Oral Daily Linda R Pistoria, PA-C    vancomycin 20 mg/kg Intravenous Q24H Donzella Carmelo Pistoria, PA-C Last Rate: 1,750 mg (10/24/19 2204)        Today, Patient Was Seen By: Barak Boland MD    ** Please Note: Dragon 360 Dictation voice to text software may have been used in the creation of this document   **

## 2019-10-25 NOTE — ASSESSMENT & PLAN NOTE
· UA with positive bacteria and WBC's  · Follow up on final urine cultures    Continue empiric antibiotics

## 2019-10-26 VITALS
RESPIRATION RATE: 20 BRPM | BODY MASS INDEX: 21.32 KG/M2 | HEART RATE: 63 BPM | WEIGHT: 157.19 LBS | SYSTOLIC BLOOD PRESSURE: 136 MMHG | TEMPERATURE: 97.7 F | DIASTOLIC BLOOD PRESSURE: 65 MMHG | OXYGEN SATURATION: 96 %

## 2019-10-26 PROBLEM — A41.9 SEVERE SEPSIS (HCC): Status: RESOLVED | Noted: 2019-10-23 | Resolved: 2019-10-26

## 2019-10-26 PROBLEM — R65.20 SEVERE SEPSIS (HCC): Status: RESOLVED | Noted: 2019-10-23 | Resolved: 2019-10-26

## 2019-10-26 PROBLEM — E87.79 CARDIAC VOLUME OVERLOAD: Status: RESOLVED | Noted: 2019-10-24 | Resolved: 2019-10-26

## 2019-10-26 LAB
ANION GAP SERPL CALCULATED.3IONS-SCNC: 7 MMOL/L (ref 4–13)
BUN SERPL-MCNC: 23 MG/DL (ref 5–25)
CALCIUM SERPL-MCNC: 8.3 MG/DL (ref 8.3–10.1)
CHLORIDE SERPL-SCNC: 108 MMOL/L (ref 100–108)
CO2 SERPL-SCNC: 30 MMOL/L (ref 21–32)
CREAT SERPL-MCNC: 1.16 MG/DL (ref 0.6–1.3)
GFR SERPL CREATININE-BSD FRML MDRD: 55 ML/MIN/1.73SQ M
GLUCOSE SERPL-MCNC: 110 MG/DL (ref 65–140)
POTASSIUM SERPL-SCNC: 3.9 MMOL/L (ref 3.5–5.3)
PROCALCITONIN SERPL-MCNC: 0.43 NG/ML
SODIUM SERPL-SCNC: 145 MMOL/L (ref 136–145)

## 2019-10-26 PROCEDURE — 84145 PROCALCITONIN (PCT): CPT | Performed by: INTERNAL MEDICINE

## 2019-10-26 PROCEDURE — 80048 BASIC METABOLIC PNL TOTAL CA: CPT | Performed by: INTERNAL MEDICINE

## 2019-10-26 PROCEDURE — 99239 HOSP IP/OBS DSCHRG MGMT >30: CPT | Performed by: INTERNAL MEDICINE

## 2019-10-26 RX ORDER — CEPHALEXIN 500 MG/1
500 CAPSULE ORAL EVERY 12 HOURS SCHEDULED
Status: DISCONTINUED | OUTPATIENT
Start: 2019-10-26 | End: 2019-10-26 | Stop reason: HOSPADM

## 2019-10-26 RX ORDER — CEPHALEXIN 500 MG/1
500 CAPSULE ORAL EVERY 12 HOURS SCHEDULED
Qty: 8 CAPSULE | Refills: 0
Start: 2019-10-26 | End: 2019-10-30

## 2019-10-26 RX ADMIN — TAMSULOSIN HYDROCHLORIDE 0.4 MG: 0.4 CAPSULE ORAL at 09:55

## 2019-10-26 RX ADMIN — LEVOTHYROXINE SODIUM 75 MCG: 75 TABLET ORAL at 05:11

## 2019-10-26 RX ADMIN — APIXABAN 2.5 MG: 2.5 TABLET, FILM COATED ORAL at 09:55

## 2019-10-26 RX ADMIN — FINASTERIDE 5 MG: 5 TABLET, FILM COATED ORAL at 09:55

## 2019-10-26 RX ADMIN — CEPHALEXIN 500 MG: 500 CAPSULE ORAL at 09:59

## 2019-10-26 NOTE — PLAN OF CARE
Problem: Potential for Falls  Goal: Patient will remain free of falls  Description  INTERVENTIONS:  - Assess patient frequently for physical needs  -  Identify cognitive and physical deficits and behaviors that affect risk of falls  -  [place] fall precautions as indicated by assessment   - Educate patient/family on patient safety including physical limitations  - Instruct patient to call for assistance with activity based on assessment  - Modify environment to reduce risk of injury  - Consider OT/PT consult to assist with strengthening/mobility  Outcome: Progressing     Problem: Prexisting or High Potential for Compromised Skin Integrity  Goal: Skin integrity is maintained or improved  Description  INTERVENTIONS:  - Identify patients at risk for skin breakdown  - Assess and monitor skin integrity  - Assess and monitor nutrition and hydration status  - Monitor labs   - Assess for incontinence   - Turn and reposition patient  - Assist with mobility/ambulation  - Relieve pressure over bony prominences  - Avoid friction and shearing  - Provide appropriate hygiene as needed including keeping skin clean and dry  - Evaluate need for skin moisturizer/barrier cream  - Collaborate with interdisciplinary team   - Patient/family teaching  - Consider wound care consult   Outcome: Progressing     Problem: Nutrition/Hydration-ADULT  Goal: Nutrient/Hydration intake appropriate for improving, restoring or maintaining nutritional needs  Description  Monitor and assess patient's nutrition/hydration status for malnutrition  Collaborate with interdisciplinary team and initiate plan and interventions as ordered  Monitor patient's weight and dietary intake as ordered or per policy  Utilize nutrition screening tool and intervene as necessary  Determine patient's food preferences and provide high-protein, high-caloric foods as appropriate       INTERVENTIONS:  - Monitor oral intake, urinary output, labs, and treatment plans  - Assess nutrition and hydration status and recommend course of action  - Evaluate amount of meals eaten  - Assist patient with eating if necessary   - Allow adequate time for meals  - Recommend/ encourage appropriate diets, oral nutritional supplements, and vitamin/mineral supplements  - Order, calculate, and assess calorie counts as needed  - Recommend, monitor, and adjust tube feedings and TPN/PPN based on assessed needs  - Assess need for intravenous fluids  - Provide specific nutrition/hydration education as appropriate  - Include patient/family/caregiver in decisions related to nutrition  Outcome: Progressing     Problem: PAIN - ADULT  Goal: Verbalizes/displays adequate comfort level or baseline comfort level  Description  Interventions:  - Encourage patient to monitor pain and request assistance  - Assess pain using appropriate pain scale  - Administer analgesics based on type and severity of pain and evaluate response  - Implement non-pharmacological measures as appropriate and evaluate response  - Consider cultural and social influences on pain and pain management  - Notify physician/advanced practitioner if interventions unsuccessful or patient reports new pain  Outcome: Progressing     Problem: INFECTION - ADULT  Goal: Absence or prevention of progression during hospitalization  Description  INTERVENTIONS:  - Assess and monitor for signs and symptoms of infection  - Monitor lab/diagnostic results  - Monitor all insertion sites, i e  indwelling lines, tubes, and drains  - Monitor endotracheal if appropriate and nasal secretions for changes in amount and color  - [place] appropriate cooling/warming therapies per order  - Administer medications as ordered  - Instruct and encourage patient and family to use good hand hygiene technique  - Identify and instruct in appropriate isolation precautions for identified infection/condition  Outcome: Progressing  Goal: Absence of fever/infection during neutropenic period  Description  INTERVENTIONS:  - Monitor WBC    Outcome: Progressing     Problem: SAFETY ADULT  Goal: Maintain or return to baseline ADL function  Description  INTERVENTIONS:  -  Assess patient's ability to carry out ADLs; assess patient's baseline for ADL function and identify physical deficits which impact ability to perform ADLs (bathing, care of mouth/teeth, toileting, grooming, dressing, etc )  - Assess/evaluate cause of self-care deficits   - Assess range of motion  - Assess patient's mobility; develop plan if impaired  - Assess patient's need for assistive devices and provide as appropriate  - Encourage maximum independence but intervene and supervise when necessary  - Involve family in performance of ADLs  - Assess for home care needs following discharge   - Consider OT consult to assist with ADL evaluation and planning for discharge  - Provide patient education as appropriate  Outcome: Progressing  Goal: Maintain or return mobility status to optimal level  Description  INTERVENTIONS:  - Assess patient's baseline mobility status (ambulation, transfers, stairs, etc )    - Identify cognitive and physical deficits and behaviors that affect mobility  - Identify mobility aids required to assist with transfers and/or ambulation (gait belt, sit-to-stand, lift, walker, cane, etc )  - [place] fall precautions as indicated by assessment  - Record patient progress and toleration of activity level on Mobility SBAR; progress patient to next Phase/Stage  - Instruct patient to call for assistance with activity based on assessment  - Consider rehabilitation consult to assist with strengthening/weightbearing, etc   Outcome: Progressing     Problem: DISCHARGE PLANNING  Goal: Discharge to home or other facility with appropriate resources  Description  INTERVENTIONS:  - Identify barriers to discharge w/patient and caregiver  - Arrange for needed discharge resources and transportation as appropriate  - Identify discharge learning needs (meds, wound care, etc )  - Arrange for interpretive services to assist at discharge as needed  - Refer to Case Management Department for coordinating discharge planning if the patient needs post-hospital services based on physician/advanced practitioner order or complex needs related to functional status, cognitive ability, or social support system  Outcome: Progressing     Problem: Knowledge Deficit  Goal: Patient/family/caregiver demonstrates understanding of disease process, treatment plan, medications, and discharge instructions  Description  Complete learning assessment and assess knowledge base    Interventions:  - Provide teaching at level of understanding  - Provide teaching via preferred learning methods  Outcome: Progressing

## 2019-10-26 NOTE — SOCIAL WORK
CM received call from [place] stating that patient will return to [facility] between 12:30-1pm  CM informed family, SLIM, nursing, and facility of transport time  Medical necessity and facesheet placed in chart

## 2019-10-26 NOTE — SOCIAL WORK
CM called Juanito Wright at [facility] to arrange for transport  Juanito Wright will call back with transport time

## 2019-10-26 NOTE — DISCHARGE SUMMARY
Discharge Summary - [address] [facility]    Patient Information: Lexus Kelly 80 y o  male MRN: [id number]  Unit/Bed#: -01 Encounter: [id number]    Discharging Physician / Practitioner: María Calvillo MD  PCP: Kamille Aceves MD  Admission Date: 10/23/2019  Discharge Date: 10/26/19    Reason for Admission:  Severe sepsis    Discharge Diagnoses:     Principal Problem:    Severe sepsis [facility])  Active Problems:    Cardiac volume overload    Chronic ulcer of great toe of right foot ([facility] [address] )    Persistent atrial fibrillation    Acute kidney injury on Stage 3 chronic kidney disease ([facility] )    Suspected UTI (urinary tract infection)    Severe aortic stenosis    BPH (benign prostatic hyperplasia)    Hypothyroidism      Consultations During Hospital Stay:  · Podiatry    Procedures Performed:   · None    Significant findings:  · Echocardiogram - EF 60%, severe AS, severe TR    Hospital Course:   Lexus Kelly is a 80 y o  male patient who originally presented to the hospital on 10/23/2019 due to fever  On presentation, temp was elevated at 1 1 8  He had leukocytosis with elevated lactic acid and elevated procalcitonin  Source of his infection was likely urinary tract infection  Urine cultures were positive Proteus mirabilis  He was treated with IV ceftriaxone over his hospital course  He had 1 of 2 blood cultures positive for coagulase-negative Staph which was determined to be a contaminant  He had resolution of fever and leukocytosis prior to discharge as well as normalization of lactic acid  Creatinine was elevated at 1 53 on presentation  This improved to 1 16 by the time of discharge  Patient has history of aortic stenosis chronic right foot ulcer  He has specified in the past that he does not want any intervention for right foot chronic ulcer  Also in regards to cardiac history, he follows with a cardiologist outpatient and is being managed medically as he is not a candidate for intervention    He improved over his hospital course and was cleared for discharge back to his facility on 10/26/19  He is recommended to continue physical therapy at his skilled nursing facility  Condition at Discharge: stable     Discharge Day Visit / Exam:     Subjective:  No new complaints or acute overnight events  Feels overall improved  Vitals: Blood Pressure: 136/65 (10/26/19 0700)  Pulse: 63 (10/26/19 0700)  Temperature: 97 7 °F (36 5 °C) (10/26/19 0700)  Temp Source: Oral (10/26/19 0700)  Respirations: 20 (10/26/19 0700)  Weight - Scale: 71 3 kg (157 lb 3 oz) (10/26/19 0600)  SpO2: 96 % (10/26/19 0700)    General Appearance:    Alert, chronically ill appearing, pleasant, no acute distress   Head:    Normocephalic, without obvious abnormality, atraumatic, mucous membranes moist    Eyes:    Conjunctiva/corneas clear, EOM's intact   Neck:   Supple   Abdomen:     Soft, nondistended   Extremities:  Stable right great toe ulcer with dry eschar, no edema, no erythema   Neurologic:  Alert and oriented x3      Discharge instructions/Information to patient and family:   See after visit summary for information provided to patient and family  Provisions for Follow-Up Care:  See after visit summary for information related to follow-up care and any pertinent home health orders  Disposition: Skilled nursing facility      Discharge Statement:  I spent >30 minutes discharging the patient  This time was spent on the day of discharge  I had direct contact with the patient on the day of discharge  Greater than 50% of the total time was spent examining patient, answering all patient questions, arranging and discussing plan of care with patient as well as directly providing post-discharge instructions  Additional time then spent on discharge activities  Discharge Medications:  See after visit summary for reconciled discharge medications provided to patient and family        ** Please Note: Dragon 360 Dictation voice to text software may have been used in the creation of this document   **

## 2019-10-26 NOTE — TRANSPORTATION MEDICAL NECESSITY
Section I - General Information    Name of Patient: Clayton Mckeon                 : 1/15/1929    Medicare #: [id number]  Transport Date: 10/26/19 (PCS is valid for round trips on this date and for all repetitive trips in the 60-day range as noted below )  Origin: [address]: [place] [address]  Is the pt's stay covered under Medicare Part A (PPS/DRG)   []     Closest appropriate facility? If no, why is transport to more distant facility required? Yes  If hospice pt, is this transport related to pt's terminal illness? NA     Section II - Medical Necessity Questionnaire  Ambulance transportation is medically necessary only if other means of transport are contraindicated or would be potentially harmful to the patient  To meet this requirement, the patient must either be "bed confined" or suffer from a condition such that transport by means other than ambulance is contraindicated by the patient's condition  The following questions must be answered by the medical professional signing below for this form to be valid:    1)  Describe the MEDICAL CONDITION (physical and/or mental) of this patient AT [address] [place] [address] that requires the patient to be transported in an ambulance and why transport by other means is contraindicated by the patient's condition: Patient needs dependent means for out of bed mobilization    2) Is the patient "bed confined" as defined below? Yes  To be "be confined" the patient must satisfy all three of the following conditions: (1) unable to get up from bed without Assistance; AND (2) unable to ambulate; AND (3) unable to sit in a chair or wheelchair  3) Can this patient safely be transported by car or wheelchair van (i e , seated during transport without a medical attendant or monitoring)?   No    4) In addition to completing questions 1-3 above, please check any of the following conditions that apply*:   *Note: supporting documentation for any boxes checked must be maintained in the patient's medical records  If hosp-hosp transfer, describe services needed at 2nd facility not available at 1st facility? Patient is confused  Medical attendant required   Requires oxygen-unable to self administer  Unable to tolerate seated position for time needed to transport   Other(specify) bed mobility max assist of 2      Section III - Signature of Physician or Healthcare Professional  I certify that the above information is true and correct based on my evaluation of this patient, and represent that the patient requires transport by ambulance and that other forms of transport are contraindicated  I understand that this information will be used by the Centers for Medicare and Medicaid Services (CMS) to support the determination of medical necessity for ambulance services, and I represent that I have personal knowledge of the patient's condition at time of transport  []  If this box is checked, I also certify that the patient is physically or mentally incapable of signing the ambulance service's claim and that the institution with which I am affiliated has furnished care, services, or assistance to the patient  Bed mobility is max assist of 2  Patient has 2L of O2 and is unable to self administer  My signature below is made on behalf of the patient pursuant to 42 CFR §424 36(b)(4)  In accordance with 42 CFR §424 37, the specific reason(s) that the patient is physically or mentally incapable of signing the claim form is as follows: N/A      Signature of Physician* or Healthcare Professional______________________________________________________________  Signature Date 10/26/19 (For scheduled repetitive transports, this form is not valid for transports performed more than 60 days after this date)    Printed Name & Credentials of Physician or Healthcare Professional (MD, , RN, etc )_Bettye GARCIA/CANDIS_______________________________  *Form must be signed by patient's attending physician for scheduled, repetitive transports   For non-repetitive, unscheduled ambulance transports, if unable to obtain the signature of the attending physician, any of the following may sign (choose appropriate option below)  [] Physician Assistant []  Clinical Nurse Specialist []  Registered Nurse  []  Nurse Practitioner  [x] Discharge Planner

## 2019-10-27 LAB
BACTERIA BLD CULT: ABNORMAL
GRAM STN SPEC: ABNORMAL

## 2019-10-28 LAB — BACTERIA BLD CULT: NORMAL

## 2020-01-03 ENCOUNTER — HOSPITAL ENCOUNTER (EMERGENCY)
Facility: HOSPITAL | Age: 85
Discharge: NURSING, REHAB OR LONG-TERM CARE | End: 2020-01-03
Attending: EMERGENCY MEDICINE | Admitting: EMERGENCY MEDICINE
Payer: MEDICARE

## 2020-01-03 VITALS
TEMPERATURE: 97.4 F | SYSTOLIC BLOOD PRESSURE: 124 MMHG | DIASTOLIC BLOOD PRESSURE: 68 MMHG | HEART RATE: 48 BPM | BODY MASS INDEX: 26.25 KG/M2 | OXYGEN SATURATION: 98 % | WEIGHT: 193.56 LBS | RESPIRATION RATE: 16 BRPM

## 2020-01-03 DIAGNOSIS — R41.82 ALTERED MENTAL STATUS, UNSPECIFIED ALTERED MENTAL STATUS TYPE: Primary | ICD-10-CM

## 2020-01-03 DIAGNOSIS — R00.1 BRADYCARDIA: ICD-10-CM

## 2020-01-03 LAB
ALBUMIN SERPL BCP-MCNC: 2.6 G/DL (ref 3.5–5)
ALP SERPL-CCNC: 84 U/L (ref 46–116)
ALT SERPL W P-5'-P-CCNC: 13 U/L (ref 12–78)
ANION GAP SERPL CALCULATED.3IONS-SCNC: 5 MMOL/L (ref 4–13)
APTT PPP: 35 SECONDS (ref 23–37)
AST SERPL W P-5'-P-CCNC: 13 U/L (ref 5–45)
BACTERIA UR QL AUTO: NORMAL /HPF
BASOPHILS # BLD AUTO: 0.03 THOUSANDS/ΜL (ref 0–0.1)
BASOPHILS NFR BLD AUTO: 1 % (ref 0–1)
BILIRUB SERPL-MCNC: 0.64 MG/DL (ref 0.2–1)
BILIRUB UR QL STRIP: NEGATIVE
BUN SERPL-MCNC: 21 MG/DL (ref 5–25)
CALCIUM SERPL-MCNC: 8.6 MG/DL (ref 8.3–10.1)
CHLORIDE SERPL-SCNC: 108 MMOL/L (ref 100–108)
CLARITY UR: CLEAR
CO2 SERPL-SCNC: 29 MMOL/L (ref 21–32)
COLOR UR: YELLOW
CREAT SERPL-MCNC: 1.44 MG/DL (ref 0.6–1.3)
DIGOXIN SERPL-MCNC: 0.2 NG/ML (ref 0.8–2)
EOSINOPHIL # BLD AUTO: 0.12 THOUSAND/ΜL (ref 0–0.61)
EOSINOPHIL NFR BLD AUTO: 3 % (ref 0–6)
ERYTHROCYTE [DISTWIDTH] IN BLOOD BY AUTOMATED COUNT: 15.5 % (ref 11.6–15.1)
GFR SERPL CREATININE-BSD FRML MDRD: 42 ML/MIN/1.73SQ M
GLUCOSE SERPL-MCNC: 89 MG/DL (ref 65–140)
GLUCOSE UR STRIP-MCNC: NEGATIVE MG/DL
HCT VFR BLD AUTO: 38.1 % (ref 36.5–49.3)
HGB BLD-MCNC: 12 G/DL (ref 12–17)
HGB UR QL STRIP.AUTO: ABNORMAL
IMM GRANULOCYTES # BLD AUTO: 0.01 THOUSAND/UL (ref 0–0.2)
IMM GRANULOCYTES NFR BLD AUTO: 0 % (ref 0–2)
INR PPP: 1.42 (ref 0.84–1.19)
KETONES UR STRIP-MCNC: NEGATIVE MG/DL
LACTATE SERPL-SCNC: 1 MMOL/L (ref 0.5–2)
LEUKOCYTE ESTERASE UR QL STRIP: NEGATIVE
LYMPHOCYTES # BLD AUTO: 1.05 THOUSANDS/ΜL (ref 0.6–4.47)
LYMPHOCYTES NFR BLD AUTO: 25 % (ref 14–44)
MCH RBC QN AUTO: 28.1 PG (ref 26.8–34.3)
MCHC RBC AUTO-ENTMCNC: 31.5 G/DL (ref 31.4–37.4)
MCV RBC AUTO: 89 FL (ref 82–98)
MONOCYTES # BLD AUTO: 0.34 THOUSAND/ΜL (ref 0.17–1.22)
MONOCYTES NFR BLD AUTO: 8 % (ref 4–12)
NEUTROPHILS # BLD AUTO: 2.69 THOUSANDS/ΜL (ref 1.85–7.62)
NEUTS SEG NFR BLD AUTO: 63 % (ref 43–75)
NITRITE UR QL STRIP: NEGATIVE
NON-SQ EPI CELLS URNS QL MICRO: NORMAL /HPF
NRBC BLD AUTO-RTO: 0 /100 WBCS
PH UR STRIP.AUTO: 7 [PH]
PLATELET # BLD AUTO: 147 THOUSANDS/UL (ref 149–390)
PMV BLD AUTO: 9.8 FL (ref 8.9–12.7)
POTASSIUM SERPL-SCNC: 4.4 MMOL/L (ref 3.5–5.3)
PROT SERPL-MCNC: 6.2 G/DL (ref 6.4–8.2)
PROT UR STRIP-MCNC: NEGATIVE MG/DL
PROTHROMBIN TIME: 16.6 SECONDS (ref 11.6–14.5)
RBC # BLD AUTO: 4.27 MILLION/UL (ref 3.88–5.62)
RBC #/AREA URNS AUTO: NORMAL /HPF
SODIUM SERPL-SCNC: 142 MMOL/L (ref 136–145)
SP GR UR STRIP.AUTO: 1.01 (ref 1–1.03)
TSH SERPL DL<=0.05 MIU/L-ACNC: 3.69 UIU/ML (ref 0.36–3.74)
UROBILINOGEN UR QL STRIP.AUTO: 0.2 E.U./DL
WBC # BLD AUTO: 4.24 THOUSAND/UL (ref 4.31–10.16)
WBC #/AREA URNS AUTO: NORMAL /HPF

## 2020-01-03 PROCEDURE — 80162 ASSAY OF DIGOXIN TOTAL: CPT | Performed by: EMERGENCY MEDICINE

## 2020-01-03 PROCEDURE — 99285 EMERGENCY DEPT VISIT HI MDM: CPT

## 2020-01-03 PROCEDURE — 96361 HYDRATE IV INFUSION ADD-ON: CPT

## 2020-01-03 PROCEDURE — 36415 COLL VENOUS BLD VENIPUNCTURE: CPT | Performed by: EMERGENCY MEDICINE

## 2020-01-03 PROCEDURE — 81001 URINALYSIS AUTO W/SCOPE: CPT | Performed by: EMERGENCY MEDICINE

## 2020-01-03 PROCEDURE — 87040 BLOOD CULTURE FOR BACTERIA: CPT | Performed by: EMERGENCY MEDICINE

## 2020-01-03 PROCEDURE — 80053 COMPREHEN METABOLIC PANEL: CPT | Performed by: EMERGENCY MEDICINE

## 2020-01-03 PROCEDURE — 93005 ELECTROCARDIOGRAM TRACING: CPT

## 2020-01-03 PROCEDURE — 84443 ASSAY THYROID STIM HORMONE: CPT | Performed by: EMERGENCY MEDICINE

## 2020-01-03 PROCEDURE — 83605 ASSAY OF LACTIC ACID: CPT | Performed by: EMERGENCY MEDICINE

## 2020-01-03 PROCEDURE — 85610 PROTHROMBIN TIME: CPT | Performed by: EMERGENCY MEDICINE

## 2020-01-03 PROCEDURE — 85025 COMPLETE CBC W/AUTO DIFF WBC: CPT | Performed by: EMERGENCY MEDICINE

## 2020-01-03 PROCEDURE — 99284 EMERGENCY DEPT VISIT MOD MDM: CPT | Performed by: EMERGENCY MEDICINE

## 2020-01-03 PROCEDURE — 96360 HYDRATION IV INFUSION INIT: CPT

## 2020-01-03 PROCEDURE — 85730 THROMBOPLASTIN TIME PARTIAL: CPT | Performed by: EMERGENCY MEDICINE

## 2020-01-03 RX ADMIN — SODIUM CHLORIDE 1000 ML: 0.9 INJECTION, SOLUTION INTRAVENOUS at 10:58

## 2020-01-03 NOTE — ED PROVIDER NOTES
History  Chief Complaint   Patient presents with    Altered Mental Status     Per EMS, pt resides at [facility] and states they went to get him out of bed this morning and the patient "wouldn't Ooh or Aah for them"  Pt has history of dementia  Pt does answer when you talk to him  79-year-old male, normally fully conversive, this morning was not as talkative as he normally is, patient is able to be awoke in but much more somnolent than baseline as per daughter  Once awake and patient will speak a few sentences the drift back to sleep this is unlike him  In the past he has had symptoms like this which resulted in a urinary tract infection  No modifying or alleviating factors not try any remedies  , patient was brought here                                          History provided by:  Patient and spouse  History limited by:  Mental status change      Prior to Admission Medications   Prescriptions Last Dose Informant Patient Reported? Taking? Calcium Citrate 250 MG TABS  Outside Facility (Specify) Yes No   Sig: Take 4 tablets by mouth daily   Cholecalciferol (VITAMIN D3) 2000 UNITS TABS  Outside Facility (Specify) Yes No   Sig: Take 2,000 Units by mouth daily     SB POLYETHYLENE GLYCOL 3350 PO  Outside Facility (Specify) Yes No   Sig: Take by mouth as needed   Sodium Phosphates (ENEMA READY-TO-USE) 7-19 GM/118ML ENEM  Outside Facility (Specify) Yes No   Sig: Insert into the rectum daily as needed   Tamsulosin HCl (FLOMAX PO)  Outside Facility (Specify) Yes No   Sig: Take 0 4 mg by mouth daily     acetaminophen (TYLENOL) 325 mg tablet  Outside Facility (Specify) No No   Sig: Take 2 tablets by mouth every 6 (six) hours as needed for mild pain   Patient taking differently: Take 650 mg by mouth 3 (three) times a day     apixaban (ELIQUIS) 5 mg  Outside Facility (Specify) Yes No   Sig: Take 5 mg by mouth 2 (two) times a day   bisacodyl (BISAC-EVAC) 10 mg suppository  Outside Facility (Specify) Yes No   Sig: Insert 10 mg into the rectum daily   cyanocobalamin (VITAMIN B-12) 1,000 mcg tablet  Outside Facility (Specify) Yes No   Sig: Take 1,000 mcg by mouth daily  doxycycline hyclate (VIBRAMYCIN) 100 mg capsule  Outside Facility (Specify) Yes No   Sig: Take 100 mg by mouth every 12 (twelve) hours   finasteride (PROSCAR) 5 mg tablet  Outside Facility (Specify) Yes No   Sig: Take 5 mg by mouth daily  gabapentin (NEURONTIN) 100 mg capsule  Outside Facility (Specify) No No   Sig: Take 1 capsule by mouth daily at bedtime   levothyroxine 75 mcg tablet  Outside Facility (Specify) Yes No   Sig: Take 75 mcg by mouth daily    magnesium hydroxide (MILK OF MAGNESIA) 400 mg/5 mL oral suspension  Outside Facility (Specify) Yes No   Sig: Take 30 mL by mouth daily as needed for constipation   traMADol (ULTRAM) 50 mg tablet  Outside Facility (Specify) Yes No   Sig: Take 25 mg by mouth every 6 (six) hours as needed for moderate pain      Facility-Administered Medications: None       Past Medical History:   Diagnosis Date    Actinic keratosis     Anemia     chronic hemolytic anemia    Atrial fibrillation (HCC)     Benign prostatic hypertrophy     Blood in urine     Cellulitis     Dermatitis     Disease of thyroid gland     Dyslipidemia     Epidural hematoma (HCC)     Epistaxis     Falling     Folate deficiency     GERD (gastroesophageal reflux disease)     Hip fracture (HCC)     Hyperlipidemia     Hypertension     Hypothyroidism (acquired)     Insomnia     Neuropathy     Nocturia     Osteoarthritis     Psychiatric disorder     depression    Renal disorder     Acute renal failure    Sepsis ([facility] [address] )     Shoulder pain, right     Squamous cell carcinoma     Subdural hematoma (HCC)     Thrombophlebitis     Urinary retention        Past Surgical History:   Procedure Laterality Date    HIP SURGERY         History reviewed  No pertinent family history    I have reviewed and agree with the history as documented  Social History     Tobacco Use    Smoking status: Former Smoker     Last attempt to quit: 1976     Years since quittin 9    Smokeless tobacco: Former User   Substance Use Topics    Alcohol use: No     Comment: once a week    Drug use: No        Review of Systems   Constitutional: Negative for activity change, chills, diaphoresis and fever  HENT: Negative for congestion, sinus pressure and sore throat  Eyes: Negative for pain and visual disturbance  Respiratory: Negative for cough, chest tightness, shortness of breath, wheezing and stridor  Cardiovascular: Negative for chest pain and palpitations  Gastrointestinal: Negative for abdominal distention, abdominal pain, constipation, diarrhea, nausea and vomiting  Genitourinary: Negative for dysuria and frequency  Musculoskeletal: Negative for neck pain and neck stiffness  Skin: Negative for rash  Neurological: Negative for dizziness, speech difficulty, light-headedness, numbness and headaches  Physical Exam  Physical Exam   Constitutional: He appears well-developed  No distress  HENT:   Head: Normocephalic and atraumatic  Eyes: Pupils are equal, round, and reactive to light  Neck: Normal range of motion  Neck supple  No tracheal deviation present  Cardiovascular: Normal heart sounds and intact distal pulses  No murmur heard  Slow atrial fibrillation, ventricular rate in the high 40s   Pulmonary/Chest: Effort normal and breath sounds normal  No stridor  No respiratory distress  Abdominal: Soft  He exhibits no distension  There is no tenderness  There is no rebound and no guarding  Musculoskeletal: Normal range of motion  Neurological: He is alert  He is disoriented  GCS eye subscore is 3  GCS verbal subscore is 4  GCS motor subscore is 6  Skin: Skin is warm and dry  He is not diaphoretic  No erythema  No pallor  Psychiatric: He has a normal mood and affect  Vitals reviewed        Vital Signs  ED Triage Vitals [01/03/20 1004]   Temperature Pulse Respirations Blood Pressure SpO2   (!) 97 4 °F (36 3 °C) (!) 45 16 127/72 97 %      Temp Source Heart Rate Source Patient Position - Orthostatic VS BP Location FiO2 (%)   Oral Monitor Lying Right arm --      Pain Score       No Pain           Vitals:    01/03/20 1004 01/03/20 1100 01/03/20 1241   BP: 127/72     Pulse: (!) 45 (!) 42 (!) 48   Patient Position - Orthostatic VS: Lying           Visual Acuity      ED Medications  Medications   sodium chloride 0 9 % bolus 1,000 mL (0 mL Intravenous Stopped 1/3/20 1241)       Diagnostic Studies  Results Reviewed     Procedure Component Value Units Date/Time    Urine Microscopic [[id number]]  (Normal) Collected:  01/03/20 1249    Lab Status:  Final result Specimen:  Urine Updated:  01/03/20 1315     RBC, UA None Seen /hpf      WBC, UA None Seen /hpf      Epithelial Cells Occasional /hpf      Bacteria, UA None Seen /hpf     UA w Reflex to Microscopic w Reflex to Culture [[id number]]  (Abnormal) Collected:  01/03/20 1249    Lab Status:  Final result Specimen:  Urine Updated:  01/03/20 1253     Color, UA Yellow     Clarity, UA Clear     Specific Gravity, UA 1 010     pH, UA 7 0     Leukocytes, UA Negative     Nitrite, UA Negative     Protein, UA Negative mg/dl      Glucose, UA Negative mg/dl      Ketones, UA Negative mg/dl      Urobilinogen, UA 0 2 E U /dl      Bilirubin, UA Negative     Blood, UA Trace-Intact    Digoxin level [[id number]]  (Abnormal) Collected:  01/03/20 1132    Lab Status:  Final result Specimen:  Blood from Arm, Right Updated:  01/03/20 1232     Digoxin Lvl 0 2 ng/mL     Lactic acid, plasma [[id number]]  (Normal) Collected:  01/03/20 1132    Lab Status:  Final result Specimen:  Blood from Arm, Right Updated:  01/03/20 1209     LACTIC ACID 1 0 mmol/L     Narrative:       Result may be elevated if tourniquet was used during collection      TSH [[id number]]  (Normal) Collected:  01/03/20 1132    Lab Status:  Final result Specimen:  Blood from Arm, Right Updated:  01/03/20 1206     TSH 3RD GENERATON 3 686 uIU/mL     Narrative:       Patients undergoing fluorescein dye angiography may retain small amounts of fluorescein in the body for 48-72 hours post procedure  Samples containing fluorescein can produce falsely depressed TSH values  If the patient had this procedure,a specimen should be resubmitted post fluorescein clearance        Protime-INR [[id number]]  (Abnormal) Collected:  01/03/20 1132    Lab Status:  Final result Specimen:  Blood from Arm, Right Updated:  01/03/20 1202     Protime 16 6 seconds      INR 1 42    APTT [[id number]]  (Normal) Collected:  01/03/20 1132    Lab Status:  Final result Specimen:  Blood from Arm, Right Updated:  01/03/20 1202     PTT 35 seconds     Comprehensive metabolic panel [[id number]]  (Abnormal) Collected:  01/03/20 1132    Lab Status:  Final result Specimen:  Blood from Arm, Right Updated:  01/03/20 1159     Sodium 142 mmol/L      Potassium 4 4 mmol/L      Chloride 108 mmol/L      CO2 29 mmol/L      ANION GAP 5 mmol/L      BUN 21 mg/dL      Creatinine 1 44 mg/dL      Glucose 89 mg/dL      Calcium 8 6 mg/dL      AST 13 U/L      ALT 13 U/L      Alkaline Phosphatase 84 U/L      Total Protein 6 2 g/dL      Albumin 2 6 g/dL      Total Bilirubin 0 64 mg/dL      eGFR 42 ml/min/1 73sq m     Narrative:       [facility] guidelines for Chronic Kidney Disease (CKD):     Stage 1 with normal or high GFR (GFR > 90 mL/min/1 73 square meters)    Stage 2 Mild CKD (GFR = 60-89 mL/min/1 73 square meters)    Stage 3A Moderate CKD (GFR = 45-59 mL/min/1 73 square meters)    Stage 3B Moderate CKD (GFR = 30-44 mL/min/1 73 square meters)    Stage 4 Severe CKD (GFR = 15-29 mL/min/1 73 square meters)    Stage 5 End Stage CKD (GFR <15 mL/min/1 73 square meters)  Note: GFR calculation is accurate only with a steady state creatinine    CBC and differential [[id number]]  (Abnormal) Collected: 01/03/20 1132    Lab Status:  Final result Specimen:  Blood from Arm, Right Updated:  01/03/20 1143     WBC 4 24 Thousand/uL      RBC 4 27 Million/uL      Hemoglobin 12 0 g/dL      Hematocrit 38 1 %      MCV 89 fL      MCH 28 1 pg      MCHC 31 5 g/dL      RDW 15 5 %      MPV 9 8 fL      Platelets 069 Thousands/uL      nRBC 0 /100 WBCs      Neutrophils Relative 63 %      Immat GRANS % 0 %      Lymphocytes Relative 25 %      Monocytes Relative 8 %      Eosinophils Relative 3 %      Basophils Relative 1 %      Neutrophils Absolute 2 69 Thousands/µL      Immature Grans Absolute 0 01 Thousand/uL      Lymphocytes Absolute 1 05 Thousands/µL      Monocytes Absolute 0 34 Thousand/µL      Eosinophils Absolute 0 12 Thousand/µL      Basophils Absolute 0 03 Thousands/µL     Blood culture #1 [[id number]] Collected:  01/03/20 1132    Lab Status: In process Specimen:  Blood from Arm, Right Updated:  01/03/20 1139    Blood culture #2 [[id number]] Collected:  01/03/20 1131    Lab Status:   In process Specimen:  Blood from Hand, Right Updated:  01/03/20 1138                 No orders to display              Procedures  ECG 12 Lead Documentation Only  Date/Time: 1/3/2020 2:54 PM  Performed by: Mich Mcgee DO  Authorized by: Mich Mcgee DO     ECG reviewed by me, the ED Provider: yes    Patient location:  ED  Previous ECG:     Previous ECG:  Compared to current    Comparison ECG info:  October 23rd, 2019, at that time he still in atrial fibrillation but rate was 95    Similarity:  Changes noted  Interpretation:     Interpretation: non-specific    Rate:     ECG rate:  50    ECG rate assessment: bradycardic    Rhythm:     Rhythm: atrial fibrillation    Ectopy:     Ectopy: none    QRS:     QRS axis:  Normal    QRS intervals:  Normal  Conduction:     Conduction: normal    ST segments:     ST segments:  Non-specific  T waves:     T waves: non-specific               ED Course  ED Course as of Jan 03 1457   Fri Jan 03, 2020   1324 Workup unremarkable straight cath urine without sign of infection unclear etiology of patient's altered mental status, persistently bradycardic, will admit the hospital for further workup and evaluation/observation      1345 Patient is woke up from a nap, back to completely normal mental status, still bradycardic discussed with daughter about admission, she does not want him admitted unless he absolutely has to , explained this can be followed as an outpatient  , patient to follow with outpatient PCP                                  MDM  Number of Diagnoses or Management Options  Altered mental status, unspecified altered mental status type: new and requires workup  Bradycardia: new and requires workup  Diagnosis management comments:       Initial ED assessment:  55-year-old male, presents for increasing somnolence, answers a few words in a sentence interest to sleep  This is very unusual for him as per patient's daughter  Found to be in a slow atrial fibrillation ear  No medication changes  Patient is not on beta-blockers  Initial DDx includes but is not limited to:   sepsis, electrolyte abnormality, excessive daytime somnolence    Initial ED plan:   Blood work, cardiac monitoring, straight cathing, possible/likely admission        Final ED summary/disposition:   After evaluation and workup in the emergency department, patient ultimately woke up here  , after being here for a few hours became fully conversant, states he was just really tired and sleeping, stating he had a poor night sleep the night before, patient offered/recommended admission for his bradycardia him and his daughter do not want this  , they will follow up with outpatient cardiology a PCP       Amount and/or Complexity of Data Reviewed  Clinical lab tests: ordered and reviewed  Decide to obtain previous medical records or to obtain history from someone other than the patient: yes  Obtain history from someone other than the patient: yes  Review and summarize past medical records: yes  Independent visualization of images, tracings, or specimens: yes          Disposition  Final diagnoses: Altered mental status, unspecified altered mental status type - Now resolved   Bradycardia     Time reflects when diagnosis was documented in both MDM as applicable and the Disposition within this note     Time User Action Codes Description Comment    1/3/2020  1:46 PM Floy Drafts Add [R41 82] Altered mental status, unspecified altered mental status type     1/3/2020  1:46 PM Floy Drafts Modify [R41 82] Altered mental status, unspecified altered mental status type Now resolved    1/3/2020  1:46 PM Floy Drafts Add [R00 1] Bradycardia       ED Disposition     ED Disposition Condition Date/Time Comment    Discharge Stable Fri Isidro 3, 2020  1:46 PM Judit Hinojosa discharge to home/self care  Follow-up Information     Follow up With Specialties Details Why Contact Info Additional Information    Ta Juarez MD Internal Medicine Call in 1 day To arrange for the next available appointment [address] [place] [address] [facility] Call in 1 day To arrange for the next available appointment [address], [address] [phone] [address] Cardiology [address] [place] [address], [facility]          Patient's Medications   Discharge Prescriptions    No medications on file     No discharge procedures on file      ED Provider  Electronically Signed by           Porsha Farias DO  01/03/20 7712

## 2020-01-03 NOTE — ED NOTES
Report called to [facility]    [facility] will be here to  at [address] [place] [address] [address], [address] [facility]  01/03/20 1958

## 2020-01-04 LAB
ATRIAL RATE: 57 BPM
QRS AXIS: 16 DEGREES
QRSD INTERVAL: 86 MS
QT INTERVAL: 490 MS
QTC INTERVAL: 446 MS
T WAVE AXIS: 46 DEGREES
VENTRICULAR RATE: 50 BPM

## 2020-01-04 PROCEDURE — 93010 ELECTROCARDIOGRAM REPORT: CPT | Performed by: INTERNAL MEDICINE

## 2020-01-08 ENCOUNTER — TRANSCRIBE ORDERS (OUTPATIENT)
Dept: ADMINISTRATIVE | Facility: HOSPITAL | Age: 85
End: 2020-01-08

## 2020-01-08 DIAGNOSIS — R00.1 BRADYCARDIA: Primary | ICD-10-CM

## 2020-01-08 LAB
BACTERIA BLD CULT: NORMAL
BACTERIA BLD CULT: NORMAL

## 2020-01-20 ENCOUNTER — HOSPITAL ENCOUNTER (OUTPATIENT)
Dept: NON INVASIVE DIAGNOSTICS | Facility: CLINIC | Age: 85
Discharge: HOME | End: 2020-01-20
Payer: MEDICARE

## 2020-01-20 DIAGNOSIS — R00.1 BRADYCARDIA: ICD-10-CM

## 2020-01-20 PROCEDURE — 93225 XTRNL ECG REC<48 HRS REC: CPT

## 2020-01-20 PROCEDURE — 93226 XTRNL ECG REC<48 HR SCAN A/R: CPT

## 2020-01-22 PROCEDURE — 93227 XTRNL ECG REC<48 HR R&I: CPT | Performed by: INTERNAL MEDICINE

## 2020-02-27 ENCOUNTER — OFFICE VISIT (OUTPATIENT)
Dept: CARDIOLOGY CLINIC | Facility: CLINIC | Age: 85
End: 2020-02-27
Payer: MEDICARE

## 2020-02-27 VITALS
HEART RATE: 54 BPM | HEIGHT: 72 IN | SYSTOLIC BLOOD PRESSURE: 116 MMHG | DIASTOLIC BLOOD PRESSURE: 66 MMHG | BODY MASS INDEX: 26.25 KG/M2

## 2020-02-27 DIAGNOSIS — Z86.79 HISTORY OF SUBDURAL HEMATOMA: Chronic | ICD-10-CM

## 2020-02-27 DIAGNOSIS — I48.11 LONGSTANDING PERSISTENT ATRIAL FIBRILLATION (HCC): Primary | ICD-10-CM

## 2020-02-27 DIAGNOSIS — I35.0 SEVERE AORTIC STENOSIS: ICD-10-CM

## 2020-02-27 DIAGNOSIS — N18.30 STAGE 3 CHRONIC KIDNEY DISEASE (HCC): ICD-10-CM

## 2020-02-27 DIAGNOSIS — E78.5 DYSLIPIDEMIA: ICD-10-CM

## 2020-02-27 PROCEDURE — 99214 OFFICE O/P EST MOD 30 MIN: CPT | Performed by: INTERNAL MEDICINE

## 2020-02-27 PROCEDURE — 93000 ELECTROCARDIOGRAM COMPLETE: CPT | Performed by: INTERNAL MEDICINE

## 2020-02-27 RX ORDER — CEPHALEXIN 500 MG/1
CAPSULE ORAL
COMMUNITY
Start: 2020-02-20 | End: 2020-04-27 | Stop reason: HOSPADM

## 2020-02-27 NOTE — PROGRESS NOTES
Cardiology Follow Up    Alec Chau  1/15/1929  [id number]  [address] [facility]  [address] [facility] [address]    1  Persistent atrial fibrillation  POCT ECG   2  Severe aortic stenosis     3  Stage 3 chronic kidney disease ([facility] [address] )     4  Dyslipidemia     5  History of subdural hematoma         Discussion/Summary: Overall things have been stable from a cardiac standpoint  He had 1 episode of bradycardia January but a Holter monitor following that showed no significant pauses  He has severe aortic stenosis  We have elected to be conservative with the medical approach at this point time  He is becoming quite more deconditioned and dementia has been advancing  I have scheduled him for 1 year follow-up they will call me if needed  Still told then that possibility of a pacemaker could be entertained mainly for patient's comfort  Interval History:  80year-old gentle with a history of persistent atrial fibrillation off anticoagulation secondary to multiple falls and subdural hematoma, hypertension, hyperlipidemia presents for follow-up visit  Denies chest pain, shortness of breath, palpitations, lightheadedness, dizziness, or syncope  He denies any lower extremity edema, PND, orthopnea  He is quite deconditioned he has a bedsore now  Functional capacity has been declining with age  Patient remains stable at a nursing facility  He had an episode of bradycardia in the setting of acute infection  Follow-up alter monitor showed no significant abnormalities  There has been no syncope  Patient has dementia on able to provide much history  Nose reported falls  He is mostly wheelchair bound at this point time  No significant change in edema  He does have a chronic infection on the right leg      Problem List     Subdural hematoma (HCC)    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[facility] [address] )    Atrial fibrillation (HCC) (Chronic) Overview Signed 2/6/2018  7:45 AM by Brayan Colunga DO     Description: Paroxysmal, on chronic anticoagulation         History of coagulopathy    Hypothyroidism (Chronic)    Chronic anemia (Chronic)    Hypertension (Chronic)    Hyperlipidemia (Chronic)    BPH (benign prostatic hyperplasia) (Chronic)    History of subdural hematoma (Chronic)    Overview Signed 2/4/2016 10:12 AM by Ata Marley PA-C     January 2015         Osteoarthritis (Chronic)    Ambulatory dysfunction    Hypoalbuminemia due to protein-calorie malnutrition (HCC)    Benign prostatic hypertrophy    Hypothyroidism (acquired)    Closed fracture of second lumbar vertebra with routine healing    Subarachnoid bleed (HCC)    Acute midline low back pain with sciatica    Dyslipidemia    Leg edema        Past Medical History:   Diagnosis Date    Actinic keratosis     Anemia     chronic hemolytic anemia    Atrial fibrillation (HCC)     Benign prostatic hypertrophy     Blood in urine     Cellulitis     Dermatitis     Disease of thyroid gland     Dyslipidemia     Epidural hematoma (HCC)     Epistaxis     Falling     Folate deficiency     GERD (gastroesophageal reflux disease)     Hip fracture (HCC)     Hyperlipidemia     Hypertension     Hypothyroidism (acquired)     Insomnia     Neuropathy     Nocturia     Osteoarthritis     Psychiatric disorder     depression    Renal disorder     Acute renal failure    Sepsis ([address] )     Shoulder pain, right     Squamous cell carcinoma     Subdural hematoma (HCC)     Thrombophlebitis     Urinary retention      Social History     Socioeconomic History    Marital status:       Spouse name: Not on file    Number of children: Not on file    Years of education: Not on file    Highest education level: Not on file   Occupational History    Not on file   Social Needs    Financial resource strain: Not on file    Food insecurity:     Worry: Not on file     Inability: Not on file  Transportation needs:     Medical: Not on file     Non-medical: Not on file   Tobacco Use    Smoking status: Former Smoker     Last attempt to quit: 1976     Years since quittin 0    Smokeless tobacco: Former User   Substance and Sexual Activity    Alcohol use: No     Comment: once a week    Drug use: No    Sexual activity: Not on file   Lifestyle    Physical activity:     Days per week: Not on file     Minutes per session: Not on file    Stress: Not on file   Relationships    Social connections:     Talks on phone: Not on file     Gets together: Not on file     Attends Taoist service: Not on file     Active member of club or organization: Not on file     Attends meetings of clubs or organizations: Not on file     Relationship status: Not on file    Intimate partner violence:     Fear of current or ex partner: Not on file     Emotionally abused: Not on file     Physically abused: Not on file     Forced sexual activity: Not on file   Other Topics Concern    Not on file   Social History Narrative    Not on file      History reviewed  No pertinent family history  Past Surgical History:   Procedure Laterality Date    HIP SURGERY         Current Outpatient Medications:     acetaminophen (TYLENOL) 325 mg tablet, Take 2 tablets by mouth every 6 (six) hours as needed for mild pain (Patient taking differently: Take 650 mg by mouth 3 (three) times a day  ), Disp: 30 tablet, Rfl: 0    apixaban (ELIQUIS) 5 mg, Take 5 mg by mouth 2 (two) times a day, Disp: , Rfl:     bisacodyl (BISAC-EVAC) 10 mg suppository, Insert 10 mg into the rectum daily, Disp: , Rfl:     Calcium Citrate 250 MG TABS, Take 4 tablets by mouth daily, Disp: , Rfl:     cephalexin (KEFLEX) 500 mg capsule, , Disp: , Rfl:     Cholecalciferol (VITAMIN D3) 2000 UNITS TABS, Take 2,000 Units by mouth daily  , Disp: , Rfl:     cyanocobalamin (VITAMIN B-12) 1,000 mcg tablet, Take 1,000 mcg by mouth daily  , Disp: , Rfl:     doxycycline hyclate (VIBRAMYCIN) 100 mg capsule, Take 100 mg by mouth every 12 (twelve) hours, Disp: , Rfl:     finasteride (PROSCAR) 5 mg tablet, Take 5 mg by mouth daily  , Disp: , Rfl:     gabapentin (NEURONTIN) 100 mg capsule, Take 1 capsule by mouth daily at bedtime, Disp: 5 capsule, Rfl: 0    levothyroxine 75 mcg tablet, Take 75 mcg by mouth daily , Disp: , Rfl:     magnesium hydroxide (MILK OF MAGNESIA) 400 mg/5 mL oral suspension, Take 30 mL by mouth daily as needed for constipation, Disp: , Rfl:     mupirocin (BACTROBAN) 2 % ointment, , Disp: , Rfl:     SB POLYETHYLENE GLYCOL 3350 PO, Take by mouth as needed, Disp: , Rfl:     Sodium Phosphates (ENEMA READY-TO-USE) 7-19 GM/118ML ENEM, Insert into the rectum daily as needed, Disp: , Rfl:     Tamsulosin HCl (FLOMAX PO), Take 0 4 mg by mouth daily  , Disp: , Rfl:     traMADol (ULTRAM) 50 mg tablet, Take 25 mg by mouth every 6 (six) hours as needed for moderate pain, Disp: , Rfl:   No Known Allergies    Labs:     Chemistry        Component Value Date/Time     01/26/2014 0555    K 4 4 01/03/2020 1132    K 4 0 01/26/2014 0555     01/03/2020 1132     01/26/2014 0555    CO2 29 01/03/2020 1132    CO2 27 01/26/2014 0555    BUN 21 01/03/2020 1132    BUN 18 01/26/2014 0555    CREATININE 1 44 (H) 01/03/2020 1132    CREATININE 1 27 01/26/2014 0555        Component Value Date/Time    CALCIUM 8 6 01/03/2020 1132    CALCIUM 7 9 (L) 01/26/2014 0555    ALKPHOS 84 01/03/2020 1132    ALKPHOS 100 01/22/2014 0008    AST 13 01/03/2020 1132    AST 21 01/22/2014 0008    ALT 13 01/03/2020 1132    ALT 16 01/22/2014 0008    BILITOT 0 3 01/22/2014 0008            No results found for: CHOL  No results found for: HDL  No results found for: LDLCALC  No results found for: TRIG  No results found for: CHOLHDL    Imaging: Xr Spine Thoracic 2 Vw    Result Date: 1/31/2018  Narrative: THORACIC SPINE INDICATION: Back pain with sciatica, unspecified side  History of fracture  COMPARISON: MRI thoracolumbar spine 12/14/2017 VIEWS:  Erect AP and lateral projections IMAGES:  3 FINDINGS: Thoracic dextroscoliosis with increased thoracic kyphosis  T9 and L1 fracture deformities described on recent MRI are not clearly appreciated by plain film  There is no discernible loss of vertebral body height  No other discernible fractures are seen  Bridging paravertebral ossifications are seen throughout the thoracic and upper lumbar spine  There is no displacement of the paraspinal line  The pedicles are difficult to evaluate on this study  Impression: Limited study  T9 and L1 fracture deformities described on recent MRI are not clearly appreciated by plain film  Thoracic dextroscoliosis with bridging ossifications throughout the visualized spine  Workstation performed: [id number]       ECG:    AFib with controlled rate      Review of Systems   Constitution: Negative  HENT: Negative  Eyes: Negative  Cardiovascular: Negative  Respiratory: Negative  Endocrine: Negative  Hematologic/Lymphatic: Negative  Skin: Negative  Musculoskeletal: Negative  Gastrointestinal: Negative  Genitourinary: Negative  Neurological: Negative  Psychiatric/Behavioral: Negative  Vitals:    02/27/20 1045   BP: 116/66   Pulse: (!) 54     Vitals:     Height: 6' (182 9 cm)   Body mass index is 26 25 kg/m²  Physical Exam:  Vital signs reviewed  General:  Alert and cooperative, appears stated age, no acute distress  HEENT:  PERRLA, EOMI, no scleral icterus, no conjunctival pallor  Neck:  No lymphadenopathy, no thyromegaly, no carotid bruits, no elevated JVP  Heart:  Regular rate and rhythm, normal S1/S2, no K3/F8,  2/6 systolic ejection murmur, rubs or gallops  PMI nondisplaced  Lungs:  Clear to auscultation bilaterally, no wheezes rales or rhonchi  Abdomen:  Soft, non-tender, positive bowel sounds, no rebound or guarding,   no organomegaly   Extremities:  Normal range of motion    No clubbing, cyanosis or edema   Vascular:  2+ pedal pulses  Skin:  No rashes or lesions on exposed skin  Neurologic:  Cranial nerves II-XII grossly intact without focal deficits

## 2020-04-21 ENCOUNTER — APPOINTMENT (EMERGENCY)
Dept: RADIOLOGY | Facility: HOSPITAL | Age: 85
DRG: 640 | End: 2020-04-21
Payer: MEDICARE

## 2020-04-21 ENCOUNTER — HOSPITAL ENCOUNTER (INPATIENT)
Facility: HOSPITAL | Age: 85
LOS: 6 days | Discharge: NURSING, REHAB OR LONG-TERM CARE | DRG: 640 | End: 2020-04-27
Attending: EMERGENCY MEDICINE | Admitting: HOSPITALIST
Payer: MEDICARE

## 2020-04-21 DIAGNOSIS — L97.519: ICD-10-CM

## 2020-04-21 DIAGNOSIS — E87.0 HYPERNATREMIA: Primary | ICD-10-CM

## 2020-04-21 DIAGNOSIS — R62.7 ADULT FAILURE TO THRIVE: ICD-10-CM

## 2020-04-21 DIAGNOSIS — S31.000A SACRAL WOUND: ICD-10-CM

## 2020-04-21 PROBLEM — G93.40 ENCEPHALOPATHY: Status: ACTIVE | Noted: 2020-04-21

## 2020-04-21 PROBLEM — M25.40 JOINT SWELLING: Status: ACTIVE | Noted: 2020-04-21

## 2020-04-21 LAB
ANION GAP SERPL CALCULATED.3IONS-SCNC: 6 MMOL/L (ref 4–13)
ATRIAL RATE: 156 BPM
BASOPHILS # BLD AUTO: 0.02 THOUSANDS/ΜL (ref 0–0.1)
BASOPHILS NFR BLD AUTO: 0 % (ref 0–1)
BILIRUB UR QL STRIP: NEGATIVE
BUN SERPL-MCNC: 22 MG/DL (ref 5–25)
CALCIUM SERPL-MCNC: 8.9 MG/DL (ref 8.3–10.1)
CHLORIDE SERPL-SCNC: 113 MMOL/L (ref 100–108)
CLARITY UR: CLEAR
CO2 SERPL-SCNC: 30 MMOL/L (ref 21–32)
COLOR UR: ABNORMAL
CREAT SERPL-MCNC: 1.54 MG/DL (ref 0.6–1.3)
EOSINOPHIL # BLD AUTO: 0.17 THOUSAND/ΜL (ref 0–0.61)
EOSINOPHIL NFR BLD AUTO: 2 % (ref 0–6)
ERYTHROCYTE [DISTWIDTH] IN BLOOD BY AUTOMATED COUNT: 15.4 % (ref 11.6–15.1)
GFR SERPL CREATININE-BSD FRML MDRD: 39 ML/MIN/1.73SQ M
GLUCOSE SERPL-MCNC: 99 MG/DL (ref 65–140)
GLUCOSE UR STRIP-MCNC: NEGATIVE MG/DL
HCT VFR BLD AUTO: 43 % (ref 36.5–49.3)
HGB BLD-MCNC: 13 G/DL (ref 12–17)
HGB UR QL STRIP.AUTO: NEGATIVE
IMM GRANULOCYTES # BLD AUTO: 0.02 THOUSAND/UL (ref 0–0.2)
IMM GRANULOCYTES NFR BLD AUTO: 0 % (ref 0–2)
KETONES UR STRIP-MCNC: ABNORMAL MG/DL
LEUKOCYTE ESTERASE UR QL STRIP: NEGATIVE
LYMPHOCYTES # BLD AUTO: 1.02 THOUSANDS/ΜL (ref 0.6–4.47)
LYMPHOCYTES NFR BLD AUTO: 14 % (ref 14–44)
MCH RBC QN AUTO: 28.3 PG (ref 26.8–34.3)
MCHC RBC AUTO-ENTMCNC: 30.2 G/DL (ref 31.4–37.4)
MCV RBC AUTO: 94 FL (ref 82–98)
MONOCYTES # BLD AUTO: 0.61 THOUSAND/ΜL (ref 0.17–1.22)
MONOCYTES NFR BLD AUTO: 8 % (ref 4–12)
NEUTROPHILS # BLD AUTO: 5.71 THOUSANDS/ΜL (ref 1.85–7.62)
NEUTS SEG NFR BLD AUTO: 76 % (ref 43–75)
NITRITE UR QL STRIP: NEGATIVE
NRBC BLD AUTO-RTO: 0 /100 WBCS
PH UR STRIP.AUTO: 5.5 [PH]
PLATELET # BLD AUTO: 147 THOUSANDS/UL (ref 149–390)
PMV BLD AUTO: 11.2 FL (ref 8.9–12.7)
POTASSIUM SERPL-SCNC: 4.5 MMOL/L (ref 3.5–5.3)
PROCALCITONIN SERPL-MCNC: <0.05 NG/ML
PROT UR STRIP-MCNC: NEGATIVE MG/DL
QRS AXIS: 26 DEGREES
QRSD INTERVAL: 78 MS
QT INTERVAL: 384 MS
QTC INTERVAL: 448 MS
RBC # BLD AUTO: 4.59 MILLION/UL (ref 3.88–5.62)
SARS-COV-2 RNA RESP QL NAA+PROBE: NEGATIVE
SODIUM SERPL-SCNC: 149 MMOL/L (ref 136–145)
SP GR UR STRIP.AUTO: 1.02 (ref 1–1.03)
T WAVE AXIS: 26 DEGREES
URATE SERPL-MCNC: 8.7 MG/DL (ref 4.2–8)
UROBILINOGEN UR QL STRIP.AUTO: 0.2 E.U./DL
VENTRICULAR RATE: 82 BPM
WBC # BLD AUTO: 7.55 THOUSAND/UL (ref 4.31–10.16)

## 2020-04-21 PROCEDURE — 81003 URINALYSIS AUTO W/O SCOPE: CPT | Performed by: EMERGENCY MEDICINE

## 2020-04-21 PROCEDURE — 71045 X-RAY EXAM CHEST 1 VIEW: CPT

## 2020-04-21 PROCEDURE — 36415 COLL VENOUS BLD VENIPUNCTURE: CPT | Performed by: EMERGENCY MEDICINE

## 2020-04-21 PROCEDURE — 99285 EMERGENCY DEPT VISIT HI MDM: CPT | Performed by: EMERGENCY MEDICINE

## 2020-04-21 PROCEDURE — 93005 ELECTROCARDIOGRAM TRACING: CPT

## 2020-04-21 PROCEDURE — 84145 PROCALCITONIN (PCT): CPT | Performed by: INTERNAL MEDICINE

## 2020-04-21 PROCEDURE — 99285 EMERGENCY DEPT VISIT HI MDM: CPT

## 2020-04-21 PROCEDURE — 80048 BASIC METABOLIC PNL TOTAL CA: CPT | Performed by: EMERGENCY MEDICINE

## 2020-04-21 PROCEDURE — 96374 THER/PROPH/DIAG INJ IV PUSH: CPT

## 2020-04-21 PROCEDURE — 87635 SARS-COV-2 COVID-19 AMP PRB: CPT | Performed by: EMERGENCY MEDICINE

## 2020-04-21 PROCEDURE — 87081 CULTURE SCREEN ONLY: CPT | Performed by: HOSPITALIST

## 2020-04-21 PROCEDURE — 85025 COMPLETE CBC W/AUTO DIFF WBC: CPT | Performed by: EMERGENCY MEDICINE

## 2020-04-21 PROCEDURE — 99223 1ST HOSP IP/OBS HIGH 75: CPT | Performed by: HOSPITALIST

## 2020-04-21 PROCEDURE — 93010 ELECTROCARDIOGRAM REPORT: CPT | Performed by: INTERNAL MEDICINE

## 2020-04-21 PROCEDURE — 84550 ASSAY OF BLOOD/URIC ACID: CPT | Performed by: HOSPITALIST

## 2020-04-21 RX ORDER — MELATONIN
2000 DAILY
Status: DISCONTINUED | OUTPATIENT
Start: 2020-04-22 | End: 2020-04-27 | Stop reason: HOSPADM

## 2020-04-21 RX ORDER — GABAPENTIN 100 MG/1
100 CAPSULE ORAL
Status: DISCONTINUED | OUTPATIENT
Start: 2020-04-21 | End: 2020-04-27 | Stop reason: HOSPADM

## 2020-04-21 RX ORDER — DOCUSATE SODIUM 100 MG/1
100 CAPSULE, LIQUID FILLED ORAL 2 TIMES DAILY
Status: DISCONTINUED | OUTPATIENT
Start: 2020-04-21 | End: 2020-04-27 | Stop reason: HOSPADM

## 2020-04-21 RX ORDER — ONDANSETRON 2 MG/ML
4 INJECTION INTRAMUSCULAR; INTRAVENOUS EVERY 6 HOURS PRN
Status: DISCONTINUED | OUTPATIENT
Start: 2020-04-21 | End: 2020-04-27 | Stop reason: HOSPADM

## 2020-04-21 RX ORDER — TAMSULOSIN HYDROCHLORIDE 0.4 MG/1
0.4 CAPSULE ORAL DAILY
Status: DISCONTINUED | OUTPATIENT
Start: 2020-04-22 | End: 2020-04-27 | Stop reason: HOSPADM

## 2020-04-21 RX ORDER — BISACODYL 10 MG
10 SUPPOSITORY, RECTAL RECTAL DAILY PRN
Status: DISCONTINUED | OUTPATIENT
Start: 2020-04-21 | End: 2020-04-27 | Stop reason: HOSPADM

## 2020-04-21 RX ORDER — MORPHINE SULFATE 4 MG/ML
2 INJECTION, SOLUTION INTRAMUSCULAR; INTRAVENOUS ONCE
Status: COMPLETED | OUTPATIENT
Start: 2020-04-21 | End: 2020-04-21

## 2020-04-21 RX ORDER — SODIUM CHLORIDE 450 MG/100ML
150 INJECTION, SOLUTION INTRAVENOUS CONTINUOUS
Status: DISCONTINUED | OUTPATIENT
Start: 2020-04-21 | End: 2020-04-21

## 2020-04-21 RX ORDER — POLYETHYLENE GLYCOL 3350 17 G/17G
17 POWDER, FOR SOLUTION ORAL DAILY
Status: DISCONTINUED | OUTPATIENT
Start: 2020-04-22 | End: 2020-04-27 | Stop reason: HOSPADM

## 2020-04-21 RX ORDER — SODIUM CHLORIDE 450 MG/100ML
200 INJECTION, SOLUTION INTRAVENOUS CONTINUOUS
Status: DISCONTINUED | OUTPATIENT
Start: 2020-04-21 | End: 2020-04-21

## 2020-04-21 RX ORDER — FINASTERIDE 5 MG/1
5 TABLET, FILM COATED ORAL DAILY
Status: DISCONTINUED | OUTPATIENT
Start: 2020-04-22 | End: 2020-04-27 | Stop reason: HOSPADM

## 2020-04-21 RX ORDER — SODIUM CHLORIDE 450 MG/100ML
50 INJECTION, SOLUTION INTRAVENOUS CONTINUOUS
Status: DISCONTINUED | OUTPATIENT
Start: 2020-04-21 | End: 2020-04-22

## 2020-04-21 RX ORDER — CALCIUM CARBONATE 500(1250)
2 TABLET ORAL
Status: DISCONTINUED | OUTPATIENT
Start: 2020-04-22 | End: 2020-04-27 | Stop reason: HOSPADM

## 2020-04-21 RX ORDER — ACETAMINOPHEN 325 MG/1
650 TABLET ORAL EVERY 6 HOURS PRN
Status: DISCONTINUED | OUTPATIENT
Start: 2020-04-21 | End: 2020-04-27 | Stop reason: HOSPADM

## 2020-04-21 RX ORDER — LEVOTHYROXINE SODIUM 0.07 MG/1
75 TABLET ORAL
Status: DISCONTINUED | OUTPATIENT
Start: 2020-04-22 | End: 2020-04-27 | Stop reason: HOSPADM

## 2020-04-21 RX ADMIN — SODIUM CHLORIDE 50 ML/HR: 0.45 INJECTION, SOLUTION INTRAVENOUS at 18:20

## 2020-04-21 RX ADMIN — CEFTRIAXONE SODIUM 1000 MG: 10 INJECTION, POWDER, FOR SOLUTION INTRAVENOUS at 18:18

## 2020-04-21 RX ADMIN — SODIUM CHLORIDE 150 ML/HR: 0.45 INJECTION, SOLUTION INTRAVENOUS at 14:13

## 2020-04-21 RX ADMIN — MORPHINE SULFATE 2 MG: 4 INJECTION INTRAVENOUS at 11:40

## 2020-04-21 RX ADMIN — APIXABAN 2.5 MG: 2.5 TABLET, FILM COATED ORAL at 18:18

## 2020-04-22 PROBLEM — E44.0 MODERATE PROTEIN-CALORIE MALNUTRITION (HCC): Status: ACTIVE | Noted: 2020-04-22

## 2020-04-22 LAB
ALBUMIN SERPL BCP-MCNC: 2.3 G/DL (ref 3.5–5)
ALP SERPL-CCNC: 77 U/L (ref 46–116)
ALT SERPL W P-5'-P-CCNC: 7 U/L (ref 12–78)
AMMONIA PLAS-SCNC: 23 UMOL/L (ref 11–35)
ANION GAP SERPL CALCULATED.3IONS-SCNC: 6 MMOL/L (ref 4–13)
AST SERPL W P-5'-P-CCNC: 14 U/L (ref 5–45)
BILIRUB DIRECT SERPL-MCNC: 0.27 MG/DL (ref 0–0.2)
BILIRUB SERPL-MCNC: 0.7 MG/DL (ref 0.2–1)
BUN SERPL-MCNC: 20 MG/DL (ref 5–25)
CALCIUM SERPL-MCNC: 8 MG/DL (ref 8.3–10.1)
CHLORIDE SERPL-SCNC: 114 MMOL/L (ref 100–108)
CO2 SERPL-SCNC: 28 MMOL/L (ref 21–32)
CREAT SERPL-MCNC: 1.43 MG/DL (ref 0.6–1.3)
ERYTHROCYTE [DISTWIDTH] IN BLOOD BY AUTOMATED COUNT: 15.4 % (ref 11.6–15.1)
GFR SERPL CREATININE-BSD FRML MDRD: 43 ML/MIN/1.73SQ M
GLUCOSE SERPL-MCNC: 83 MG/DL (ref 65–140)
HCT VFR BLD AUTO: 37.5 % (ref 36.5–49.3)
HGB BLD-MCNC: 11.5 G/DL (ref 12–17)
MCH RBC QN AUTO: 27.9 PG (ref 26.8–34.3)
MCHC RBC AUTO-ENTMCNC: 30.7 G/DL (ref 31.4–37.4)
MCV RBC AUTO: 91 FL (ref 82–98)
PLATELET # BLD AUTO: 144 THOUSANDS/UL (ref 149–390)
PMV BLD AUTO: 11 FL (ref 8.9–12.7)
POTASSIUM SERPL-SCNC: 4.1 MMOL/L (ref 3.5–5.3)
PROCALCITONIN SERPL-MCNC: 0.11 NG/ML
PROT SERPL-MCNC: 6.3 G/DL (ref 6.4–8.2)
RBC # BLD AUTO: 4.12 MILLION/UL (ref 3.88–5.62)
SODIUM SERPL-SCNC: 148 MMOL/L (ref 136–145)
WBC # BLD AUTO: 6.42 THOUSAND/UL (ref 4.31–10.16)

## 2020-04-22 PROCEDURE — 99232 SBSQ HOSP IP/OBS MODERATE 35: CPT | Performed by: HOSPITALIST

## 2020-04-22 PROCEDURE — 82140 ASSAY OF AMMONIA: CPT | Performed by: PHYSICIAN ASSISTANT

## 2020-04-22 PROCEDURE — 85027 COMPLETE CBC AUTOMATED: CPT | Performed by: INTERNAL MEDICINE

## 2020-04-22 PROCEDURE — 84145 PROCALCITONIN (PCT): CPT | Performed by: INTERNAL MEDICINE

## 2020-04-22 PROCEDURE — 80076 HEPATIC FUNCTION PANEL: CPT | Performed by: PHYSICIAN ASSISTANT

## 2020-04-22 PROCEDURE — 92610 EVALUATE SWALLOWING FUNCTION: CPT

## 2020-04-22 PROCEDURE — 80048 BASIC METABOLIC PNL TOTAL CA: CPT | Performed by: INTERNAL MEDICINE

## 2020-04-22 RX ORDER — DEXTROSE MONOHYDRATE 50 MG/ML
50 INJECTION, SOLUTION INTRAVENOUS CONTINUOUS
Status: DISCONTINUED | OUTPATIENT
Start: 2020-04-22 | End: 2020-04-23

## 2020-04-22 RX ADMIN — LEVOTHYROXINE SODIUM 75 MCG: 75 TABLET ORAL at 05:31

## 2020-04-22 RX ADMIN — ACETAMINOPHEN 650 MG: 325 TABLET, FILM COATED ORAL at 22:06

## 2020-04-22 RX ADMIN — DEXTROSE 50 ML/HR: 5 SOLUTION INTRAVENOUS at 11:33

## 2020-04-22 RX ADMIN — GABAPENTIN 100 MG: 100 CAPSULE ORAL at 22:06

## 2020-04-22 RX ADMIN — APIXABAN 2.5 MG: 2.5 TABLET, FILM COATED ORAL at 18:26

## 2020-04-22 RX ADMIN — CEFTRIAXONE SODIUM 1000 MG: 10 INJECTION, POWDER, FOR SOLUTION INTRAVENOUS at 18:20

## 2020-04-23 LAB
ANION GAP SERPL CALCULATED.3IONS-SCNC: 5 MMOL/L (ref 4–13)
BUN SERPL-MCNC: 18 MG/DL (ref 5–25)
CALCIUM SERPL-MCNC: 7.5 MG/DL (ref 8.3–10.1)
CHLORIDE SERPL-SCNC: 103 MMOL/L (ref 100–108)
CO2 SERPL-SCNC: 27 MMOL/L (ref 21–32)
CREAT SERPL-MCNC: 1.55 MG/DL (ref 0.6–1.3)
GFR SERPL CREATININE-BSD FRML MDRD: 39 ML/MIN/1.73SQ M
GLUCOSE SERPL-MCNC: 350 MG/DL (ref 65–140)
MRSA NOSE QL CULT: NORMAL
POTASSIUM SERPL-SCNC: 3.6 MMOL/L (ref 3.5–5.3)
SODIUM SERPL-SCNC: 135 MMOL/L (ref 136–145)

## 2020-04-23 PROCEDURE — 80048 BASIC METABOLIC PNL TOTAL CA: CPT | Performed by: HOSPITALIST

## 2020-04-23 PROCEDURE — 99222 1ST HOSP IP/OBS MODERATE 55: CPT | Performed by: PODIATRIST

## 2020-04-23 PROCEDURE — 97163 PT EVAL HIGH COMPLEX 45 MIN: CPT

## 2020-04-23 PROCEDURE — 99232 SBSQ HOSP IP/OBS MODERATE 35: CPT | Performed by: HOSPITALIST

## 2020-04-23 PROCEDURE — 92526 ORAL FUNCTION THERAPY: CPT

## 2020-04-23 RX ORDER — SODIUM CHLORIDE 9 MG/ML
50 INJECTION, SOLUTION INTRAVENOUS CONTINUOUS
Status: DISCONTINUED | OUTPATIENT
Start: 2020-04-23 | End: 2020-04-24

## 2020-04-23 RX ADMIN — POLYETHYLENE GLYCOL 3350 17 G: 17 POWDER, FOR SOLUTION ORAL at 09:43

## 2020-04-23 RX ADMIN — TAMSULOSIN HYDROCHLORIDE 0.4 MG: 0.4 CAPSULE ORAL at 09:43

## 2020-04-23 RX ADMIN — GABAPENTIN 100 MG: 100 CAPSULE ORAL at 22:10

## 2020-04-23 RX ADMIN — LEVOTHYROXINE SODIUM 75 MCG: 75 TABLET ORAL at 06:41

## 2020-04-23 RX ADMIN — MELATONIN 2000 UNITS: at 09:43

## 2020-04-23 RX ADMIN — SODIUM CHLORIDE 50 ML/HR: 0.9 INJECTION, SOLUTION INTRAVENOUS at 14:39

## 2020-04-23 RX ADMIN — APIXABAN 2.5 MG: 2.5 TABLET, FILM COATED ORAL at 09:43

## 2020-04-23 RX ADMIN — CEFTRIAXONE SODIUM 1000 MG: 10 INJECTION, POWDER, FOR SOLUTION INTRAVENOUS at 17:09

## 2020-04-23 RX ADMIN — APIXABAN 2.5 MG: 2.5 TABLET, FILM COATED ORAL at 17:09

## 2020-04-23 RX ADMIN — DOCUSATE SODIUM 100 MG: 100 CAPSULE, LIQUID FILLED ORAL at 09:43

## 2020-04-23 RX ADMIN — FINASTERIDE 5 MG: 5 TABLET, FILM COATED ORAL at 09:44

## 2020-04-23 RX ADMIN — CYANOCOBALAMIN TAB 500 MCG 1000 MCG: 500 TAB at 09:43

## 2020-04-23 RX ADMIN — CALCIUM 2 TABLET: 500 TABLET ORAL at 09:43

## 2020-04-24 ENCOUNTER — APPOINTMENT (INPATIENT)
Dept: RADIOLOGY | Facility: HOSPITAL | Age: 85
DRG: 640 | End: 2020-04-24
Payer: MEDICARE

## 2020-04-24 PROBLEM — I95.9 HYPOTENSION: Status: ACTIVE | Noted: 2020-04-24

## 2020-04-24 PROBLEM — R50.9 FEVER: Status: ACTIVE | Noted: 2020-04-24

## 2020-04-24 LAB
ANION GAP SERPL CALCULATED.3IONS-SCNC: 7 MMOL/L (ref 4–13)
BASOPHILS # BLD AUTO: 0 THOUSANDS/ΜL (ref 0–0.1)
BASOPHILS NFR BLD AUTO: 0 % (ref 0–1)
BUN SERPL-MCNC: 16 MG/DL (ref 5–25)
CALCIUM SERPL-MCNC: 7.8 MG/DL (ref 8.3–10.1)
CHLORIDE SERPL-SCNC: 110 MMOL/L (ref 100–108)
CO2 SERPL-SCNC: 28 MMOL/L (ref 21–32)
CREAT SERPL-MCNC: 1.47 MG/DL (ref 0.6–1.3)
EOSINOPHIL # BLD AUTO: 0.01 THOUSAND/ΜL (ref 0–0.61)
EOSINOPHIL NFR BLD AUTO: 0 % (ref 0–6)
ERYTHROCYTE [DISTWIDTH] IN BLOOD BY AUTOMATED COUNT: 15.5 % (ref 11.6–15.1)
GFR SERPL CREATININE-BSD FRML MDRD: 41 ML/MIN/1.73SQ M
GLUCOSE SERPL-MCNC: 88 MG/DL (ref 65–140)
HCT VFR BLD AUTO: 34.3 % (ref 36.5–49.3)
HGB BLD-MCNC: 10.9 G/DL (ref 12–17)
IMM GRANULOCYTES # BLD AUTO: 0.01 THOUSAND/UL (ref 0–0.2)
IMM GRANULOCYTES NFR BLD AUTO: 0 % (ref 0–2)
LACTATE SERPL-SCNC: 1 MMOL/L (ref 0.5–2)
LYMPHOCYTES # BLD AUTO: 0.6 THOUSANDS/ΜL (ref 0.6–4.47)
LYMPHOCYTES NFR BLD AUTO: 12 % (ref 14–44)
MCH RBC QN AUTO: 28.2 PG (ref 26.8–34.3)
MCHC RBC AUTO-ENTMCNC: 31.8 G/DL (ref 31.4–37.4)
MCV RBC AUTO: 89 FL (ref 82–98)
MONOCYTES # BLD AUTO: 0.56 THOUSAND/ΜL (ref 0.17–1.22)
MONOCYTES NFR BLD AUTO: 11 % (ref 4–12)
NEUTROPHILS # BLD AUTO: 3.77 THOUSANDS/ΜL (ref 1.85–7.62)
NEUTS SEG NFR BLD AUTO: 77 % (ref 43–75)
NRBC BLD AUTO-RTO: 0 /100 WBCS
PLATELET # BLD AUTO: 116 THOUSANDS/UL (ref 149–390)
PMV BLD AUTO: 10.3 FL (ref 8.9–12.7)
POTASSIUM SERPL-SCNC: 4 MMOL/L (ref 3.5–5.3)
PROCALCITONIN SERPL-MCNC: 0.11 NG/ML
RBC # BLD AUTO: 3.87 MILLION/UL (ref 3.88–5.62)
SODIUM SERPL-SCNC: 145 MMOL/L (ref 136–145)
WBC # BLD AUTO: 4.95 THOUSAND/UL (ref 4.31–10.16)

## 2020-04-24 PROCEDURE — 92526 ORAL FUNCTION THERAPY: CPT

## 2020-04-24 PROCEDURE — 99232 SBSQ HOSP IP/OBS MODERATE 35: CPT | Performed by: HOSPITALIST

## 2020-04-24 PROCEDURE — 80048 BASIC METABOLIC PNL TOTAL CA: CPT | Performed by: HOSPITALIST

## 2020-04-24 PROCEDURE — 84145 PROCALCITONIN (PCT): CPT | Performed by: HOSPITALIST

## 2020-04-24 PROCEDURE — 83605 ASSAY OF LACTIC ACID: CPT | Performed by: HOSPITALIST

## 2020-04-24 PROCEDURE — 71045 X-RAY EXAM CHEST 1 VIEW: CPT

## 2020-04-24 PROCEDURE — 85025 COMPLETE CBC W/AUTO DIFF WBC: CPT | Performed by: HOSPITALIST

## 2020-04-24 RX ORDER — SODIUM CHLORIDE 450 MG/100ML
50 INJECTION, SOLUTION INTRAVENOUS CONTINUOUS
Status: DISCONTINUED | OUTPATIENT
Start: 2020-04-24 | End: 2020-04-27 | Stop reason: HOSPADM

## 2020-04-24 RX ADMIN — MELATONIN 2000 UNITS: at 09:08

## 2020-04-24 RX ADMIN — APIXABAN 2.5 MG: 2.5 TABLET, FILM COATED ORAL at 09:08

## 2020-04-24 RX ADMIN — TAMSULOSIN HYDROCHLORIDE 0.4 MG: 0.4 CAPSULE ORAL at 09:08

## 2020-04-24 RX ADMIN — DOCUSATE SODIUM 100 MG: 100 CAPSULE, LIQUID FILLED ORAL at 09:08

## 2020-04-24 RX ADMIN — ACETAMINOPHEN 650 MG: 325 TABLET, FILM COATED ORAL at 05:27

## 2020-04-24 RX ADMIN — DOCUSATE SODIUM 100 MG: 100 CAPSULE, LIQUID FILLED ORAL at 17:30

## 2020-04-24 RX ADMIN — SODIUM CHLORIDE 50 ML/HR: 0.45 INJECTION, SOLUTION INTRAVENOUS at 10:25

## 2020-04-24 RX ADMIN — CEFTRIAXONE SODIUM 1000 MG: 10 INJECTION, POWDER, FOR SOLUTION INTRAVENOUS at 16:24

## 2020-04-24 RX ADMIN — GABAPENTIN 100 MG: 100 CAPSULE ORAL at 21:33

## 2020-04-24 RX ADMIN — APIXABAN 2.5 MG: 2.5 TABLET, FILM COATED ORAL at 17:30

## 2020-04-24 RX ADMIN — FINASTERIDE 5 MG: 5 TABLET, FILM COATED ORAL at 09:08

## 2020-04-24 RX ADMIN — LEVOTHYROXINE SODIUM 75 MCG: 75 TABLET ORAL at 05:27

## 2020-04-24 RX ADMIN — CALCIUM 2 TABLET: 500 TABLET ORAL at 09:08

## 2020-04-24 RX ADMIN — CYANOCOBALAMIN TAB 500 MCG 1000 MCG: 500 TAB at 09:08

## 2020-04-24 RX ADMIN — SODIUM CHLORIDE 500 ML: 0.9 INJECTION, SOLUTION INTRAVENOUS at 09:10

## 2020-04-25 LAB
ANION GAP SERPL CALCULATED.3IONS-SCNC: 9 MMOL/L (ref 4–13)
BASOPHILS # BLD AUTO: 0.01 THOUSANDS/ΜL (ref 0–0.1)
BASOPHILS NFR BLD AUTO: 0 % (ref 0–1)
BUN SERPL-MCNC: 16 MG/DL (ref 5–25)
CALCIUM SERPL-MCNC: 7.4 MG/DL (ref 8.3–10.1)
CHLORIDE SERPL-SCNC: 110 MMOL/L (ref 100–108)
CO2 SERPL-SCNC: 25 MMOL/L (ref 21–32)
CREAT SERPL-MCNC: 1.27 MG/DL (ref 0.6–1.3)
EOSINOPHIL # BLD AUTO: 0.03 THOUSAND/ΜL (ref 0–0.61)
EOSINOPHIL NFR BLD AUTO: 1 % (ref 0–6)
ERYTHROCYTE [DISTWIDTH] IN BLOOD BY AUTOMATED COUNT: 15.5 % (ref 11.6–15.1)
GFR SERPL CREATININE-BSD FRML MDRD: 49 ML/MIN/1.73SQ M
GLUCOSE SERPL-MCNC: 82 MG/DL (ref 65–140)
HCT VFR BLD AUTO: 36.4 % (ref 36.5–49.3)
HGB BLD-MCNC: 11.5 G/DL (ref 12–17)
IMM GRANULOCYTES # BLD AUTO: 0.01 THOUSAND/UL (ref 0–0.2)
IMM GRANULOCYTES NFR BLD AUTO: 0 % (ref 0–2)
LYMPHOCYTES # BLD AUTO: 0.94 THOUSANDS/ΜL (ref 0.6–4.47)
LYMPHOCYTES NFR BLD AUTO: 20 % (ref 14–44)
MCH RBC QN AUTO: 28.1 PG (ref 26.8–34.3)
MCHC RBC AUTO-ENTMCNC: 31.6 G/DL (ref 31.4–37.4)
MCV RBC AUTO: 89 FL (ref 82–98)
MONOCYTES # BLD AUTO: 0.55 THOUSAND/ΜL (ref 0.17–1.22)
MONOCYTES NFR BLD AUTO: 12 % (ref 4–12)
NEUTROPHILS # BLD AUTO: 3.13 THOUSANDS/ΜL (ref 1.85–7.62)
NEUTS SEG NFR BLD AUTO: 67 % (ref 43–75)
NRBC BLD AUTO-RTO: 0 /100 WBCS
PLATELET # BLD AUTO: 121 THOUSANDS/UL (ref 149–390)
PMV BLD AUTO: 10.5 FL (ref 8.9–12.7)
POTASSIUM SERPL-SCNC: 3.7 MMOL/L (ref 3.5–5.3)
RBC # BLD AUTO: 4.09 MILLION/UL (ref 3.88–5.62)
SODIUM SERPL-SCNC: 144 MMOL/L (ref 136–145)
WBC # BLD AUTO: 4.67 THOUSAND/UL (ref 4.31–10.16)

## 2020-04-25 PROCEDURE — 99232 SBSQ HOSP IP/OBS MODERATE 35: CPT | Performed by: HOSPITALIST

## 2020-04-25 PROCEDURE — 85025 COMPLETE CBC W/AUTO DIFF WBC: CPT | Performed by: HOSPITALIST

## 2020-04-25 PROCEDURE — 80048 BASIC METABOLIC PNL TOTAL CA: CPT | Performed by: HOSPITALIST

## 2020-04-25 RX ADMIN — FINASTERIDE 5 MG: 5 TABLET, FILM COATED ORAL at 10:14

## 2020-04-25 RX ADMIN — CALCIUM 2 TABLET: 500 TABLET ORAL at 10:15

## 2020-04-25 RX ADMIN — LEVOTHYROXINE SODIUM 75 MCG: 75 TABLET ORAL at 04:55

## 2020-04-25 RX ADMIN — SODIUM CHLORIDE 50 ML/HR: 0.45 INJECTION, SOLUTION INTRAVENOUS at 10:20

## 2020-04-25 RX ADMIN — CEFTRIAXONE SODIUM 1000 MG: 10 INJECTION, POWDER, FOR SOLUTION INTRAVENOUS at 16:56

## 2020-04-25 RX ADMIN — MELATONIN 2000 UNITS: at 10:14

## 2020-04-25 RX ADMIN — TAMSULOSIN HYDROCHLORIDE 0.4 MG: 0.4 CAPSULE ORAL at 10:15

## 2020-04-25 RX ADMIN — APIXABAN 2.5 MG: 2.5 TABLET, FILM COATED ORAL at 10:15

## 2020-04-25 RX ADMIN — GABAPENTIN 100 MG: 100 CAPSULE ORAL at 21:17

## 2020-04-25 RX ADMIN — APIXABAN 2.5 MG: 2.5 TABLET, FILM COATED ORAL at 17:45

## 2020-04-25 RX ADMIN — CYANOCOBALAMIN TAB 500 MCG 1000 MCG: 500 TAB at 10:14

## 2020-04-26 PROBLEM — I95.9 HYPOTENSION: Status: RESOLVED | Noted: 2020-04-24 | Resolved: 2020-04-26

## 2020-04-26 LAB
BASOPHILS # BLD AUTO: 0.02 THOUSANDS/ΜL (ref 0–0.1)
BASOPHILS NFR BLD AUTO: 0 % (ref 0–1)
EOSINOPHIL # BLD AUTO: 0.04 THOUSAND/ΜL (ref 0–0.61)
EOSINOPHIL NFR BLD AUTO: 1 % (ref 0–6)
ERYTHROCYTE [DISTWIDTH] IN BLOOD BY AUTOMATED COUNT: 15.2 % (ref 11.6–15.1)
HCT VFR BLD AUTO: 35.6 % (ref 36.5–49.3)
HGB BLD-MCNC: 11.2 G/DL (ref 12–17)
IMM GRANULOCYTES # BLD AUTO: 0.02 THOUSAND/UL (ref 0–0.2)
IMM GRANULOCYTES NFR BLD AUTO: 0 % (ref 0–2)
LYMPHOCYTES # BLD AUTO: 1.2 THOUSANDS/ΜL (ref 0.6–4.47)
LYMPHOCYTES NFR BLD AUTO: 24 % (ref 14–44)
MCH RBC QN AUTO: 27.8 PG (ref 26.8–34.3)
MCHC RBC AUTO-ENTMCNC: 31.5 G/DL (ref 31.4–37.4)
MCV RBC AUTO: 88 FL (ref 82–98)
MONOCYTES # BLD AUTO: 0.53 THOUSAND/ΜL (ref 0.17–1.22)
MONOCYTES NFR BLD AUTO: 11 % (ref 4–12)
NEUTROPHILS # BLD AUTO: 3.26 THOUSANDS/ΜL (ref 1.85–7.62)
NEUTS SEG NFR BLD AUTO: 64 % (ref 43–75)
NRBC BLD AUTO-RTO: 0 /100 WBCS
PLATELET # BLD AUTO: 124 THOUSANDS/UL (ref 149–390)
PMV BLD AUTO: 11 FL (ref 8.9–12.7)
RBC # BLD AUTO: 4.03 MILLION/UL (ref 3.88–5.62)
WBC # BLD AUTO: 5.07 THOUSAND/UL (ref 4.31–10.16)

## 2020-04-26 PROCEDURE — 85025 COMPLETE CBC W/AUTO DIFF WBC: CPT | Performed by: HOSPITALIST

## 2020-04-26 PROCEDURE — 99232 SBSQ HOSP IP/OBS MODERATE 35: CPT | Performed by: HOSPITALIST

## 2020-04-26 RX ADMIN — CALCIUM 2 TABLET: 500 TABLET ORAL at 08:59

## 2020-04-26 RX ADMIN — TAMSULOSIN HYDROCHLORIDE 0.4 MG: 0.4 CAPSULE ORAL at 08:59

## 2020-04-26 RX ADMIN — APIXABAN 2.5 MG: 2.5 TABLET, FILM COATED ORAL at 17:37

## 2020-04-26 RX ADMIN — GABAPENTIN 100 MG: 100 CAPSULE ORAL at 21:03

## 2020-04-26 RX ADMIN — MELATONIN 2000 UNITS: at 08:59

## 2020-04-26 RX ADMIN — SODIUM CHLORIDE 50 ML/HR: 0.45 INJECTION, SOLUTION INTRAVENOUS at 04:42

## 2020-04-26 RX ADMIN — ACETAMINOPHEN 650 MG: 325 TABLET, FILM COATED ORAL at 21:03

## 2020-04-26 RX ADMIN — LEVOTHYROXINE SODIUM 75 MCG: 75 TABLET ORAL at 04:54

## 2020-04-26 RX ADMIN — SODIUM CHLORIDE 50 ML/HR: 0.45 INJECTION, SOLUTION INTRAVENOUS at 21:48

## 2020-04-26 RX ADMIN — APIXABAN 2.5 MG: 2.5 TABLET, FILM COATED ORAL at 08:59

## 2020-04-26 RX ADMIN — FINASTERIDE 5 MG: 5 TABLET, FILM COATED ORAL at 08:59

## 2020-04-26 RX ADMIN — CYANOCOBALAMIN TAB 500 MCG 1000 MCG: 500 TAB at 09:00

## 2020-04-26 RX ADMIN — CEFTRIAXONE SODIUM 1000 MG: 10 INJECTION, POWDER, FOR SOLUTION INTRAVENOUS at 16:12

## 2020-04-27 VITALS
HEIGHT: 72 IN | TEMPERATURE: 98.4 F | WEIGHT: 186.07 LBS | SYSTOLIC BLOOD PRESSURE: 120 MMHG | OXYGEN SATURATION: 97 % | RESPIRATION RATE: 18 BRPM | DIASTOLIC BLOOD PRESSURE: 55 MMHG | HEART RATE: 54 BPM | BODY MASS INDEX: 25.2 KG/M2

## 2020-04-27 LAB
ANION GAP SERPL CALCULATED.3IONS-SCNC: 7 MMOL/L (ref 4–13)
BASOPHILS # BLD AUTO: 0.01 THOUSANDS/ΜL (ref 0–0.1)
BASOPHILS NFR BLD AUTO: 0 % (ref 0–1)
BUN SERPL-MCNC: 12 MG/DL (ref 5–25)
CALCIUM SERPL-MCNC: 7.5 MG/DL (ref 8.3–10.1)
CHLORIDE SERPL-SCNC: 108 MMOL/L (ref 100–108)
CO2 SERPL-SCNC: 26 MMOL/L (ref 21–32)
CREAT SERPL-MCNC: 1.07 MG/DL (ref 0.6–1.3)
EOSINOPHIL # BLD AUTO: 0.06 THOUSAND/ΜL (ref 0–0.61)
EOSINOPHIL NFR BLD AUTO: 2 % (ref 0–6)
ERYTHROCYTE [DISTWIDTH] IN BLOOD BY AUTOMATED COUNT: 15.5 % (ref 11.6–15.1)
GFR SERPL CREATININE-BSD FRML MDRD: 60 ML/MIN/1.73SQ M
GLUCOSE SERPL-MCNC: 81 MG/DL (ref 65–140)
HCT VFR BLD AUTO: 33.5 % (ref 36.5–49.3)
HGB BLD-MCNC: 10.8 G/DL (ref 12–17)
IMM GRANULOCYTES # BLD AUTO: 0.01 THOUSAND/UL (ref 0–0.2)
IMM GRANULOCYTES NFR BLD AUTO: 0 % (ref 0–2)
LYMPHOCYTES # BLD AUTO: 1.13 THOUSANDS/ΜL (ref 0.6–4.47)
LYMPHOCYTES NFR BLD AUTO: 32 % (ref 14–44)
MCH RBC QN AUTO: 27.8 PG (ref 26.8–34.3)
MCHC RBC AUTO-ENTMCNC: 32.2 G/DL (ref 31.4–37.4)
MCV RBC AUTO: 86 FL (ref 82–98)
MONOCYTES # BLD AUTO: 0.38 THOUSAND/ΜL (ref 0.17–1.22)
MONOCYTES NFR BLD AUTO: 11 % (ref 4–12)
NEUTROPHILS # BLD AUTO: 1.93 THOUSANDS/ΜL (ref 1.85–7.62)
NEUTS SEG NFR BLD AUTO: 55 % (ref 43–75)
NRBC BLD AUTO-RTO: 0 /100 WBCS
PLATELET # BLD AUTO: 102 THOUSANDS/UL (ref 149–390)
PMV BLD AUTO: 10.7 FL (ref 8.9–12.7)
POTASSIUM SERPL-SCNC: 3.8 MMOL/L (ref 3.5–5.3)
PROCALCITONIN SERPL-MCNC: <0.05 NG/ML
RBC # BLD AUTO: 3.88 MILLION/UL (ref 3.88–5.62)
SODIUM SERPL-SCNC: 141 MMOL/L (ref 136–145)
WBC # BLD AUTO: 3.52 THOUSAND/UL (ref 4.31–10.16)

## 2020-04-27 PROCEDURE — 80048 BASIC METABOLIC PNL TOTAL CA: CPT | Performed by: HOSPITALIST

## 2020-04-27 PROCEDURE — 97110 THERAPEUTIC EXERCISES: CPT

## 2020-04-27 PROCEDURE — 97530 THERAPEUTIC ACTIVITIES: CPT

## 2020-04-27 PROCEDURE — 85025 COMPLETE CBC W/AUTO DIFF WBC: CPT | Performed by: HOSPITALIST

## 2020-04-27 PROCEDURE — 92526 ORAL FUNCTION THERAPY: CPT

## 2020-04-27 PROCEDURE — 84145 PROCALCITONIN (PCT): CPT | Performed by: HOSPITALIST

## 2020-04-27 PROCEDURE — 99239 HOSP IP/OBS DSCHRG MGMT >30: CPT | Performed by: HOSPITALIST

## 2020-04-27 RX ADMIN — TAMSULOSIN HYDROCHLORIDE 0.4 MG: 0.4 CAPSULE ORAL at 09:53

## 2020-04-27 RX ADMIN — LEVOTHYROXINE SODIUM 75 MCG: 75 TABLET ORAL at 05:01

## 2020-04-27 RX ADMIN — CALCIUM 2 TABLET: 500 TABLET ORAL at 09:41

## 2020-04-27 RX ADMIN — APIXABAN 2.5 MG: 2.5 TABLET, FILM COATED ORAL at 09:42

## 2020-04-27 RX ADMIN — ACETAMINOPHEN 650 MG: 325 TABLET, FILM COATED ORAL at 05:01

## 2020-04-27 RX ADMIN — CYANOCOBALAMIN TAB 500 MCG 1000 MCG: 500 TAB at 09:42

## 2020-04-27 RX ADMIN — FINASTERIDE 5 MG: 5 TABLET, FILM COATED ORAL at 09:42

## 2020-04-27 RX ADMIN — MELATONIN 2000 UNITS: at 09:42

## 2020-05-02 ENCOUNTER — APPOINTMENT (EMERGENCY)
Dept: RADIOLOGY | Facility: HOSPITAL | Age: 85
DRG: 177 | End: 2020-05-02
Payer: MEDICARE

## 2020-05-02 ENCOUNTER — HOSPITAL ENCOUNTER (INPATIENT)
Facility: HOSPITAL | Age: 85
LOS: 2 days | Discharge: NURSING, REHAB OR LONG-TERM CARE | DRG: 177 | End: 2020-05-05
Attending: INTERNAL MEDICINE | Admitting: INTERNAL MEDICINE
Payer: MEDICARE

## 2020-05-02 DIAGNOSIS — T07.XXXA WOUNDS, MULTIPLE: Primary | ICD-10-CM

## 2020-05-02 DIAGNOSIS — D64.9 ANEMIA, UNSPECIFIED TYPE: ICD-10-CM

## 2020-05-02 DIAGNOSIS — I48.19 PERSISTENT ATRIAL FIBRILLATION (HCC): ICD-10-CM

## 2020-05-02 PROBLEM — Z86.79 HISTORY OF SUBDURAL HEMATOMA: Status: ACTIVE | Noted: 2020-05-02

## 2020-05-02 PROBLEM — E78.5 HYPERLIPIDEMIA: Status: ACTIVE | Noted: 2020-05-02

## 2020-05-02 PROBLEM — R41.82 ALTERED MENTAL STATUS: Status: ACTIVE | Noted: 2020-05-02

## 2020-05-02 PROBLEM — R26.2 AMBULATORY DYSFUNCTION: Status: ACTIVE | Noted: 2020-05-02

## 2020-05-02 PROBLEM — F03.90 DEMENTIA (HCC): Status: ACTIVE | Noted: 2020-05-02

## 2020-05-02 PROBLEM — E44.0 MODERATE PROTEIN-CALORIE MALNUTRITION (HCC): Status: ACTIVE | Noted: 2020-05-02

## 2020-05-02 PROBLEM — I35.0 SEVERE AORTIC STENOSIS: Status: ACTIVE | Noted: 2020-05-02

## 2020-05-02 PROBLEM — N18.30 CKD (CHRONIC KIDNEY DISEASE) STAGE 3, GFR 30-59 ML/MIN (HCC): Status: ACTIVE | Noted: 2020-05-02

## 2020-05-02 PROBLEM — M21.961 ANKLE DEFORMITY, RIGHT: Status: ACTIVE | Noted: 2020-05-02

## 2020-05-02 LAB
ABO GROUP BLD: NORMAL
ABO GROUP BLD: NORMAL
ANION GAP SERPL CALCULATED.3IONS-SCNC: 4 MMOL/L (ref 4–13)
APTT PPP: 37 SECONDS (ref 23–37)
ATRIAL RATE: 113 BPM
BASE EXCESS BLDA CALC-SCNC: 3 MMOL/L (ref -2–3)
BASOPHILS # BLD AUTO: 0.02 THOUSANDS/ΜL (ref 0–0.1)
BASOPHILS NFR BLD AUTO: 0 % (ref 0–1)
BLD GP AB SCN SERPL QL: NEGATIVE
BUN SERPL-MCNC: 28 MG/DL (ref 5–25)
CA-I BLD-SCNC: 1.13 MMOL/L (ref 1.12–1.32)
CALCIUM SERPL-MCNC: 8.1 MG/DL (ref 8.3–10.1)
CHLORIDE SERPL-SCNC: 109 MMOL/L (ref 100–108)
CO2 SERPL-SCNC: 29 MMOL/L (ref 21–32)
CREAT SERPL-MCNC: 1.17 MG/DL (ref 0.6–1.3)
EOSINOPHIL # BLD AUTO: 0.04 THOUSAND/ΜL (ref 0–0.61)
EOSINOPHIL NFR BLD AUTO: 1 % (ref 0–6)
ERYTHROCYTE [DISTWIDTH] IN BLOOD BY AUTOMATED COUNT: 15.3 % (ref 11.6–15.1)
GFR SERPL CREATININE-BSD FRML MDRD: 54 ML/MIN/1.73SQ M
GLUCOSE SERPL-MCNC: 96 MG/DL (ref 65–140)
GLUCOSE SERPL-MCNC: 98 MG/DL (ref 65–140)
HCO3 BLDA-SCNC: 27.7 MMOL/L (ref 24–30)
HCT VFR BLD AUTO: 33.1 % (ref 36.5–49.3)
HCT VFR BLD CALC: 31 % (ref 36.5–49.3)
HGB BLD-MCNC: 10.7 G/DL (ref 12–17)
HGB BLDA-MCNC: 10.5 G/DL (ref 12–17)
IMM GRANULOCYTES # BLD AUTO: 0.02 THOUSAND/UL (ref 0–0.2)
IMM GRANULOCYTES NFR BLD AUTO: 0 % (ref 0–2)
INR PPP: 1.57 (ref 0.84–1.19)
LACTATE SERPL-SCNC: 1.6 MMOL/L (ref 0.5–2)
LACTATE SERPL-SCNC: 2.1 MMOL/L (ref 0.5–2)
LYMPHOCYTES # BLD AUTO: 0.94 THOUSANDS/ΜL (ref 0.6–4.47)
LYMPHOCYTES NFR BLD AUTO: 17 % (ref 14–44)
MCH RBC QN AUTO: 28.2 PG (ref 26.8–34.3)
MCHC RBC AUTO-ENTMCNC: 32.3 G/DL (ref 31.4–37.4)
MCV RBC AUTO: 87 FL (ref 82–98)
MONOCYTES # BLD AUTO: 0.53 THOUSAND/ΜL (ref 0.17–1.22)
MONOCYTES NFR BLD AUTO: 10 % (ref 4–12)
NEUTROPHILS # BLD AUTO: 3.95 THOUSANDS/ΜL (ref 1.85–7.62)
NEUTS SEG NFR BLD AUTO: 72 % (ref 43–75)
NRBC BLD AUTO-RTO: 0 /100 WBCS
PCO2 BLD: 29 MMOL/L (ref 21–32)
PCO2 BLD: 44.1 MM HG (ref 42–50)
PH BLD: 7.41 [PH] (ref 7.3–7.4)
PLATELET # BLD AUTO: 154 THOUSANDS/UL (ref 149–390)
PMV BLD AUTO: 10.3 FL (ref 8.9–12.7)
PO2 BLD: 18 MM HG (ref 35–45)
POTASSIUM BLD-SCNC: 4.7 MMOL/L (ref 3.5–5.3)
POTASSIUM SERPL-SCNC: 4.7 MMOL/L (ref 3.5–5.3)
PROTHROMBIN TIME: 18.3 SECONDS (ref 11.6–14.5)
QRS AXIS: 9 DEGREES
QRSD INTERVAL: 62 MS
QT INTERVAL: 398 MS
QTC INTERVAL: 441 MS
RBC # BLD AUTO: 3.8 MILLION/UL (ref 3.88–5.62)
RH BLD: POSITIVE
RH BLD: POSITIVE
SAO2 % BLD FROM PO2: 26 % (ref 60–85)
SARS-COV-2 RNA RESP QL NAA+PROBE: POSITIVE
SODIUM BLD-SCNC: 140 MMOL/L (ref 136–145)
SODIUM SERPL-SCNC: 142 MMOL/L (ref 136–145)
SPECIMEN EXPIRATION DATE: NORMAL
SPECIMEN SOURCE: ABNORMAL
T WAVE AXIS: 1 DEGREES
TROPONIN I SERPL-MCNC: 0.04 NG/ML
TSH SERPL DL<=0.05 MIU/L-ACNC: 3.17 UIU/ML (ref 0.36–3.74)
VENTRICULAR RATE: 74 BPM
WBC # BLD AUTO: 5.5 THOUSAND/UL (ref 4.31–10.16)

## 2020-05-02 PROCEDURE — 85014 HEMATOCRIT: CPT

## 2020-05-02 PROCEDURE — 82803 BLOOD GASES ANY COMBINATION: CPT

## 2020-05-02 PROCEDURE — 87040 BLOOD CULTURE FOR BACTERIA: CPT | Performed by: INTERNAL MEDICINE

## 2020-05-02 PROCEDURE — 72125 CT NECK SPINE W/O DYE: CPT

## 2020-05-02 PROCEDURE — 90715 TDAP VACCINE 7 YRS/> IM: CPT | Performed by: GENERAL PRACTICE

## 2020-05-02 PROCEDURE — 85610 PROTHROMBIN TIME: CPT | Performed by: INTERNAL MEDICINE

## 2020-05-02 PROCEDURE — 99285 EMERGENCY DEPT VISIT HI MDM: CPT

## 2020-05-02 PROCEDURE — 85025 COMPLETE CBC W/AUTO DIFF WBC: CPT | Performed by: INTERNAL MEDICINE

## 2020-05-02 PROCEDURE — 85730 THROMBOPLASTIN TIME PARTIAL: CPT | Performed by: INTERNAL MEDICINE

## 2020-05-02 PROCEDURE — 93005 ELECTROCARDIOGRAM TRACING: CPT

## 2020-05-02 PROCEDURE — NC001 PR NO CHARGE: Performed by: EMERGENCY MEDICINE

## 2020-05-02 PROCEDURE — 70450 CT HEAD/BRAIN W/O DYE: CPT

## 2020-05-02 PROCEDURE — 86901 BLOOD TYPING SEROLOGIC RH(D): CPT | Performed by: INTERNAL MEDICINE

## 2020-05-02 PROCEDURE — 82330 ASSAY OF CALCIUM: CPT

## 2020-05-02 PROCEDURE — U0003 INFECTIOUS AGENT DETECTION BY NUCLEIC ACID (DNA OR RNA); SEVERE ACUTE RESPIRATORY SYNDROME CORONAVIRUS 2 (SARS-COV-2) (CORONAVIRUS DISEASE [COVID-19]), AMPLIFIED PROBE TECHNIQUE, MAKING USE OF HIGH THROUGHPUT TECHNOLOGIES AS DESCRIBED BY CMS-2020-01-R: HCPCS | Performed by: INTERNAL MEDICINE

## 2020-05-02 PROCEDURE — 82947 ASSAY GLUCOSE BLOOD QUANT: CPT

## 2020-05-02 PROCEDURE — 80048 BASIC METABOLIC PNL TOTAL CA: CPT | Performed by: INTERNAL MEDICINE

## 2020-05-02 PROCEDURE — 70486 CT MAXILLOFACIAL W/O DYE: CPT

## 2020-05-02 PROCEDURE — 84295 ASSAY OF SERUM SODIUM: CPT

## 2020-05-02 PROCEDURE — 86850 RBC ANTIBODY SCREEN: CPT | Performed by: INTERNAL MEDICINE

## 2020-05-02 PROCEDURE — 90471 IMMUNIZATION ADMIN: CPT

## 2020-05-02 PROCEDURE — 86900 BLOOD TYPING SEROLOGIC ABO: CPT | Performed by: INTERNAL MEDICINE

## 2020-05-02 PROCEDURE — 99222 1ST HOSP IP/OBS MODERATE 55: CPT | Performed by: SURGERY

## 2020-05-02 PROCEDURE — 71260 CT THORAX DX C+: CPT

## 2020-05-02 PROCEDURE — 93010 ELECTROCARDIOGRAM REPORT: CPT | Performed by: INTERNAL MEDICINE

## 2020-05-02 PROCEDURE — 84443 ASSAY THYROID STIM HORMONE: CPT | Performed by: STUDENT IN AN ORGANIZED HEALTH CARE EDUCATION/TRAINING PROGRAM

## 2020-05-02 PROCEDURE — 83605 ASSAY OF LACTIC ACID: CPT | Performed by: INTERNAL MEDICINE

## 2020-05-02 PROCEDURE — 84484 ASSAY OF TROPONIN QUANT: CPT | Performed by: INTERNAL MEDICINE

## 2020-05-02 PROCEDURE — 74177 CT ABD & PELVIS W/CONTRAST: CPT

## 2020-05-02 PROCEDURE — 84132 ASSAY OF SERUM POTASSIUM: CPT

## 2020-05-02 PROCEDURE — 99219 PR INITIAL OBSERVATION CARE/DAY 50 MINUTES: CPT | Performed by: INTERNAL MEDICINE

## 2020-05-02 PROCEDURE — 36415 COLL VENOUS BLD VENIPUNCTURE: CPT | Performed by: GENERAL PRACTICE

## 2020-05-02 RX ORDER — TAMSULOSIN HYDROCHLORIDE 0.4 MG/1
0.4 CAPSULE ORAL
Status: DISCONTINUED | OUTPATIENT
Start: 2020-05-03 | End: 2020-05-05 | Stop reason: HOSPADM

## 2020-05-02 RX ORDER — GABAPENTIN 100 MG/1
100 CAPSULE ORAL
COMMUNITY
End: 2020-06-10 | Stop reason: HOSPADM

## 2020-05-02 RX ORDER — MELATONIN
2000 DAILY
COMMUNITY
End: 2020-06-10 | Stop reason: HOSPADM

## 2020-05-02 RX ORDER — ACETAMINOPHEN 325 MG/1
650 TABLET ORAL EVERY 6 HOURS PRN
Status: DISCONTINUED | OUTPATIENT
Start: 2020-05-02 | End: 2020-05-05 | Stop reason: HOSPADM

## 2020-05-02 RX ORDER — OXYCODONE HYDROCHLORIDE 5 MG/1
2.5 TABLET ORAL EVERY 4 HOURS PRN
Status: DISCONTINUED | OUTPATIENT
Start: 2020-05-02 | End: 2020-05-05 | Stop reason: HOSPADM

## 2020-05-02 RX ORDER — BISACODYL 10 MG
10 SUPPOSITORY, RECTAL RECTAL DAILY
Status: DISCONTINUED | OUTPATIENT
Start: 2020-05-03 | End: 2020-05-05 | Stop reason: HOSPADM

## 2020-05-02 RX ORDER — TRAMADOL HYDROCHLORIDE 50 MG/1
25 TABLET ORAL EVERY 6 HOURS PRN
COMMUNITY
End: 2020-06-10 | Stop reason: HOSPADM

## 2020-05-02 RX ORDER — DOXYCYCLINE HYCLATE 100 MG/1
100 CAPSULE ORAL EVERY 12 HOURS SCHEDULED
COMMUNITY
End: 2020-06-10 | Stop reason: HOSPADM

## 2020-05-02 RX ORDER — OXYCODONE HYDROCHLORIDE 5 MG/1
5 TABLET ORAL EVERY 4 HOURS PRN
Status: DISCONTINUED | OUTPATIENT
Start: 2020-05-02 | End: 2020-05-05 | Stop reason: HOSPADM

## 2020-05-02 RX ORDER — SODIUM CHLORIDE, SODIUM GLUCONATE, SODIUM ACETATE, POTASSIUM CHLORIDE, MAGNESIUM CHLORIDE, SODIUM PHOSPHATE, DIBASIC, AND POTASSIUM PHOSPHATE .53; .5; .37; .037; .03; .012; .00082 G/100ML; G/100ML; G/100ML; G/100ML; G/100ML; G/100ML; G/100ML
500 INJECTION, SOLUTION INTRAVENOUS ONCE
Status: COMPLETED | OUTPATIENT
Start: 2020-05-02 | End: 2020-05-02

## 2020-05-02 RX ORDER — TRAMADOL HYDROCHLORIDE 50 MG/1
25 TABLET ORAL EVERY 6 HOURS PRN
Status: CANCELLED | OUTPATIENT
Start: 2020-05-02

## 2020-05-02 RX ORDER — TAMSULOSIN HYDROCHLORIDE 0.4 MG/1
0.4 CAPSULE ORAL
COMMUNITY
End: 2020-06-10 | Stop reason: HOSPADM

## 2020-05-02 RX ORDER — BISACODYL 10 MG
10 SUPPOSITORY, RECTAL RECTAL DAILY
COMMUNITY
End: 2020-06-10 | Stop reason: HOSPADM

## 2020-05-02 RX ORDER — ACETAMINOPHEN 325 MG/1
975 TABLET ORAL EVERY 8 HOURS PRN
Status: DISCONTINUED | OUTPATIENT
Start: 2020-05-02 | End: 2020-05-03

## 2020-05-02 RX ORDER — LEVOTHYROXINE SODIUM 0.07 MG/1
75 TABLET ORAL
Status: DISCONTINUED | OUTPATIENT
Start: 2020-05-03 | End: 2020-05-05 | Stop reason: HOSPADM

## 2020-05-02 RX ORDER — ONDANSETRON 2 MG/ML
4 INJECTION INTRAMUSCULAR; INTRAVENOUS EVERY 6 HOURS PRN
Status: DISCONTINUED | OUTPATIENT
Start: 2020-05-02 | End: 2020-05-02

## 2020-05-02 RX ORDER — SODIUM CHLORIDE, SODIUM GLUCONATE, SODIUM ACETATE, POTASSIUM CHLORIDE, MAGNESIUM CHLORIDE, SODIUM PHOSPHATE, DIBASIC, AND POTASSIUM PHOSPHATE .53; .5; .37; .037; .03; .012; .00082 G/100ML; G/100ML; G/100ML; G/100ML; G/100ML; G/100ML; G/100ML
75 INJECTION, SOLUTION INTRAVENOUS CONTINUOUS
Status: DISPENSED | OUTPATIENT
Start: 2020-05-02 | End: 2020-05-03

## 2020-05-02 RX ORDER — GABAPENTIN 100 MG/1
100 CAPSULE ORAL
Status: CANCELLED | OUTPATIENT
Start: 2020-05-02

## 2020-05-02 RX ORDER — ACETAMINOPHEN 325 MG/1
650 TABLET ORAL EVERY 6 HOURS PRN
COMMUNITY
End: 2020-06-10 | Stop reason: HOSPADM

## 2020-05-02 RX ORDER — POLYETHYLENE GLYCOL 3350 17 G/17G
17 POWDER, FOR SOLUTION ORAL DAILY PRN
COMMUNITY
End: 2020-06-10 | Stop reason: HOSPADM

## 2020-05-02 RX ORDER — PSEUDOEPHEDRINE HCL 30 MG
4 TABLET ORAL DAILY
COMMUNITY
End: 2020-06-10 | Stop reason: HOSPADM

## 2020-05-02 RX ORDER — POLYETHYLENE GLYCOL 3350 17 G/17G
17 POWDER, FOR SOLUTION ORAL DAILY PRN
Status: DISCONTINUED | OUTPATIENT
Start: 2020-05-02 | End: 2020-05-05 | Stop reason: HOSPADM

## 2020-05-02 RX ORDER — FINASTERIDE 5 MG/1
5 TABLET, FILM COATED ORAL DAILY
COMMUNITY
End: 2020-06-10 | Stop reason: HOSPADM

## 2020-05-02 RX ORDER — MELATONIN
2000 DAILY
Status: DISCONTINUED | OUTPATIENT
Start: 2020-05-03 | End: 2020-05-05 | Stop reason: HOSPADM

## 2020-05-02 RX ORDER — DOXYCYCLINE HYCLATE 100 MG/1
100 CAPSULE ORAL EVERY 12 HOURS SCHEDULED
Status: DISCONTINUED | OUTPATIENT
Start: 2020-05-02 | End: 2020-05-05 | Stop reason: HOSPADM

## 2020-05-02 RX ORDER — LEVOTHYROXINE SODIUM 0.07 MG/1
75 TABLET ORAL DAILY
COMMUNITY

## 2020-05-02 RX ORDER — ONDANSETRON 2 MG/ML
4 INJECTION INTRAMUSCULAR; INTRAVENOUS EVERY 4 HOURS PRN
Status: DISCONTINUED | OUTPATIENT
Start: 2020-05-02 | End: 2020-05-05 | Stop reason: HOSPADM

## 2020-05-02 RX ORDER — FINASTERIDE 5 MG/1
5 TABLET, FILM COATED ORAL DAILY
Status: DISCONTINUED | OUTPATIENT
Start: 2020-05-03 | End: 2020-05-05 | Stop reason: HOSPADM

## 2020-05-02 RX ADMIN — SODIUM CHLORIDE, SODIUM GLUCONATE, SODIUM ACETATE, POTASSIUM CHLORIDE, MAGNESIUM CHLORIDE, SODIUM PHOSPHATE, DIBASIC, AND POTASSIUM PHOSPHATE 500 ML: .53; .5; .37; .037; .03; .012; .00082 INJECTION, SOLUTION INTRAVENOUS at 19:20

## 2020-05-02 RX ADMIN — IOHEXOL 100 ML: 350 INJECTION, SOLUTION INTRAVENOUS at 14:40

## 2020-05-02 RX ADMIN — TETANUS TOXOID, REDUCED DIPHTHERIA TOXOID AND ACELLULAR PERTUSSIS VACCINE, ADSORBED 0.5 ML: 5; 2.5; 8; 8; 2.5 SUSPENSION INTRAMUSCULAR at 14:09

## 2020-05-03 PROBLEM — T14.8XXA MULTIPLE SKIN TEARS: Status: ACTIVE | Noted: 2020-05-03

## 2020-05-03 PROBLEM — T14.8XXA HEMATOMA: Status: ACTIVE | Noted: 2020-05-03

## 2020-05-03 PROBLEM — T07.XXXA WOUNDS, MULTIPLE: Status: ACTIVE | Noted: 2020-05-03

## 2020-05-03 LAB
ALBUMIN SERPL BCP-MCNC: 2 G/DL (ref 3.5–5)
ALP SERPL-CCNC: 60 U/L (ref 46–116)
ALT SERPL W P-5'-P-CCNC: 15 U/L (ref 12–78)
ANION GAP SERPL CALCULATED.3IONS-SCNC: 7 MMOL/L (ref 4–13)
APTT PPP: 40 SECONDS (ref 23–37)
AST SERPL W P-5'-P-CCNC: 32 U/L (ref 5–45)
ATRIAL RATE: 267 BPM
BASOPHILS # BLD AUTO: 0.01 THOUSANDS/ΜL (ref 0–0.1)
BASOPHILS NFR BLD AUTO: 0 % (ref 0–1)
BILIRUB DIRECT SERPL-MCNC: 0.26 MG/DL (ref 0–0.2)
BILIRUB SERPL-MCNC: 0.65 MG/DL (ref 0.2–1)
BUN SERPL-MCNC: 23 MG/DL (ref 5–25)
CALCIUM SERPL-MCNC: 7.6 MG/DL (ref 8.3–10.1)
CHLORIDE SERPL-SCNC: 110 MMOL/L (ref 100–108)
CO2 SERPL-SCNC: 26 MMOL/L (ref 21–32)
CREAT SERPL-MCNC: 1.04 MG/DL (ref 0.6–1.3)
EOSINOPHIL # BLD AUTO: 0.03 THOUSAND/ΜL (ref 0–0.61)
EOSINOPHIL NFR BLD AUTO: 1 % (ref 0–6)
ERYTHROCYTE [DISTWIDTH] IN BLOOD BY AUTOMATED COUNT: 15.3 % (ref 11.6–15.1)
GFR SERPL CREATININE-BSD FRML MDRD: 62 ML/MIN/1.73SQ M
GLUCOSE SERPL-MCNC: 84 MG/DL (ref 65–140)
HCT VFR BLD AUTO: 29.8 % (ref 36.5–49.3)
HGB BLD-MCNC: 9.4 G/DL (ref 12–17)
IMM GRANULOCYTES # BLD AUTO: 0.02 THOUSAND/UL (ref 0–0.2)
IMM GRANULOCYTES NFR BLD AUTO: 1 % (ref 0–2)
INR PPP: 1.64 (ref 0.84–1.19)
LYMPHOCYTES # BLD AUTO: 0.75 THOUSANDS/ΜL (ref 0.6–4.47)
LYMPHOCYTES NFR BLD AUTO: 17 % (ref 14–44)
MCH RBC QN AUTO: 27.2 PG (ref 26.8–34.3)
MCHC RBC AUTO-ENTMCNC: 31.5 G/DL (ref 31.4–37.4)
MCV RBC AUTO: 86 FL (ref 82–98)
MONOCYTES # BLD AUTO: 0.51 THOUSAND/ΜL (ref 0.17–1.22)
MONOCYTES NFR BLD AUTO: 12 % (ref 4–12)
NEUTROPHILS # BLD AUTO: 3.08 THOUSANDS/ΜL (ref 1.85–7.62)
NEUTS SEG NFR BLD AUTO: 69 % (ref 43–75)
NRBC BLD AUTO-RTO: 0 /100 WBCS
PLATELET # BLD AUTO: 149 THOUSANDS/UL (ref 149–390)
PMV BLD AUTO: 10.5 FL (ref 8.9–12.7)
POTASSIUM SERPL-SCNC: 4 MMOL/L (ref 3.5–5.3)
PROT SERPL-MCNC: 5.7 G/DL (ref 6.4–8.2)
PROTHROMBIN TIME: 19 SECONDS (ref 11.6–14.5)
QRS AXIS: 9 DEGREES
QRSD INTERVAL: 64 MS
QT INTERVAL: 354 MS
QTC INTERVAL: 425 MS
RBC # BLD AUTO: 3.45 MILLION/UL (ref 3.88–5.62)
SODIUM SERPL-SCNC: 143 MMOL/L (ref 136–145)
T WAVE AXIS: -36 DEGREES
VENTRICULAR RATE: 87 BPM
WBC # BLD AUTO: 4.4 THOUSAND/UL (ref 4.31–10.16)

## 2020-05-03 PROCEDURE — 80076 HEPATIC FUNCTION PANEL: CPT | Performed by: STUDENT IN AN ORGANIZED HEALTH CARE EDUCATION/TRAINING PROGRAM

## 2020-05-03 PROCEDURE — 85610 PROTHROMBIN TIME: CPT | Performed by: STUDENT IN AN ORGANIZED HEALTH CARE EDUCATION/TRAINING PROGRAM

## 2020-05-03 PROCEDURE — 93010 ELECTROCARDIOGRAM REPORT: CPT | Performed by: INTERNAL MEDICINE

## 2020-05-03 PROCEDURE — 87040 BLOOD CULTURE FOR BACTERIA: CPT | Performed by: STUDENT IN AN ORGANIZED HEALTH CARE EDUCATION/TRAINING PROGRAM

## 2020-05-03 PROCEDURE — 85025 COMPLETE CBC W/AUTO DIFF WBC: CPT | Performed by: STUDENT IN AN ORGANIZED HEALTH CARE EDUCATION/TRAINING PROGRAM

## 2020-05-03 PROCEDURE — 85730 THROMBOPLASTIN TIME PARTIAL: CPT | Performed by: STUDENT IN AN ORGANIZED HEALTH CARE EDUCATION/TRAINING PROGRAM

## 2020-05-03 PROCEDURE — 99232 SBSQ HOSP IP/OBS MODERATE 35: CPT | Performed by: SURGERY

## 2020-05-03 PROCEDURE — 99232 SBSQ HOSP IP/OBS MODERATE 35: CPT | Performed by: INTERNAL MEDICINE

## 2020-05-03 PROCEDURE — 80048 BASIC METABOLIC PNL TOTAL CA: CPT | Performed by: STUDENT IN AN ORGANIZED HEALTH CARE EDUCATION/TRAINING PROGRAM

## 2020-05-03 PROCEDURE — 87081 CULTURE SCREEN ONLY: CPT | Performed by: INTERNAL MEDICINE

## 2020-05-03 PROCEDURE — 92610 EVALUATE SWALLOWING FUNCTION: CPT

## 2020-05-03 RX ORDER — HEPARIN SODIUM 1000 [USP'U]/ML
2000 INJECTION, SOLUTION INTRAVENOUS; SUBCUTANEOUS
Status: DISCONTINUED | OUTPATIENT
Start: 2020-05-03 | End: 2020-05-03

## 2020-05-03 RX ORDER — HEPARIN SODIUM 1000 [USP'U]/ML
4000 INJECTION, SOLUTION INTRAVENOUS; SUBCUTANEOUS ONCE
Status: DISCONTINUED | OUTPATIENT
Start: 2020-05-03 | End: 2020-05-03

## 2020-05-03 RX ORDER — HEPARIN SODIUM 10000 [USP'U]/100ML
3-20 INJECTION, SOLUTION INTRAVENOUS
Status: DISCONTINUED | OUTPATIENT
Start: 2020-05-03 | End: 2020-05-03

## 2020-05-03 RX ORDER — HEPARIN SODIUM 1000 [USP'U]/ML
4000 INJECTION, SOLUTION INTRAVENOUS; SUBCUTANEOUS
Status: DISCONTINUED | OUTPATIENT
Start: 2020-05-03 | End: 2020-05-03

## 2020-05-03 RX ADMIN — SODIUM CHLORIDE, SODIUM GLUCONATE, SODIUM ACETATE, POTASSIUM CHLORIDE, MAGNESIUM CHLORIDE, SODIUM PHOSPHATE, DIBASIC, AND POTASSIUM PHOSPHATE 75 ML/HR: .53; .5; .37; .037; .03; .012; .00082 INJECTION, SOLUTION INTRAVENOUS at 01:33

## 2020-05-03 RX ADMIN — LEVOTHYROXINE SODIUM 75 MCG: 75 TABLET ORAL at 05:08

## 2020-05-04 PROBLEM — M86.60 CHRONIC OSTEOMYELITIS (HCC): Status: ACTIVE | Noted: 2020-05-04

## 2020-05-04 LAB
ANION GAP SERPL CALCULATED.3IONS-SCNC: 5 MMOL/L (ref 4–13)
BUN SERPL-MCNC: 17 MG/DL (ref 5–25)
CALCIUM SERPL-MCNC: 7.2 MG/DL (ref 8.3–10.1)
CHLORIDE SERPL-SCNC: 111 MMOL/L (ref 100–108)
CO2 SERPL-SCNC: 27 MMOL/L (ref 21–32)
CREAT SERPL-MCNC: 1.09 MG/DL (ref 0.6–1.3)
ERYTHROCYTE [DISTWIDTH] IN BLOOD BY AUTOMATED COUNT: 15.2 % (ref 11.6–15.1)
GFR SERPL CREATININE-BSD FRML MDRD: 59 ML/MIN/1.73SQ M
GLUCOSE SERPL-MCNC: 85 MG/DL (ref 65–140)
HCT VFR BLD AUTO: 29.6 % (ref 36.5–49.3)
HGB BLD-MCNC: 9.4 G/DL (ref 12–17)
MCH RBC QN AUTO: 27.7 PG (ref 26.8–34.3)
MCHC RBC AUTO-ENTMCNC: 31.8 G/DL (ref 31.4–37.4)
MCV RBC AUTO: 87 FL (ref 82–98)
MRSA NOSE QL CULT: NORMAL
PLATELET # BLD AUTO: 151 THOUSANDS/UL (ref 149–390)
PMV BLD AUTO: 10.4 FL (ref 8.9–12.7)
POTASSIUM SERPL-SCNC: 3.6 MMOL/L (ref 3.5–5.3)
RBC # BLD AUTO: 3.39 MILLION/UL (ref 3.88–5.62)
SODIUM SERPL-SCNC: 143 MMOL/L (ref 136–145)
WBC # BLD AUTO: 4.67 THOUSAND/UL (ref 4.31–10.16)

## 2020-05-04 PROCEDURE — 85027 COMPLETE CBC AUTOMATED: CPT | Performed by: STUDENT IN AN ORGANIZED HEALTH CARE EDUCATION/TRAINING PROGRAM

## 2020-05-04 PROCEDURE — 97163 PT EVAL HIGH COMPLEX 45 MIN: CPT

## 2020-05-04 PROCEDURE — 97167 OT EVAL HIGH COMPLEX 60 MIN: CPT

## 2020-05-04 PROCEDURE — 99232 SBSQ HOSP IP/OBS MODERATE 35: CPT | Performed by: INTERNAL MEDICINE

## 2020-05-04 PROCEDURE — 80048 BASIC METABOLIC PNL TOTAL CA: CPT | Performed by: STUDENT IN AN ORGANIZED HEALTH CARE EDUCATION/TRAINING PROGRAM

## 2020-05-04 RX ORDER — GINSENG 100 MG
1 CAPSULE ORAL 2 TIMES DAILY
Status: DISCONTINUED | OUTPATIENT
Start: 2020-05-04 | End: 2020-05-05 | Stop reason: HOSPADM

## 2020-05-04 RX ADMIN — CYANOCOBALAMIN TAB 500 MCG 1000 MCG: 500 TAB at 09:39

## 2020-05-04 RX ADMIN — BISACODYL 10 MG: 10 SUPPOSITORY RECTAL at 09:40

## 2020-05-04 RX ADMIN — DOXYCYCLINE 100 MG: 100 CAPSULE ORAL at 21:13

## 2020-05-04 RX ADMIN — ACETAMINOPHEN 650 MG: 325 TABLET ORAL at 06:24

## 2020-05-04 RX ADMIN — TAMSULOSIN HYDROCHLORIDE 0.4 MG: 0.4 CAPSULE ORAL at 16:27

## 2020-05-04 RX ADMIN — MELATONIN 2000 UNITS: at 09:39

## 2020-05-04 RX ADMIN — DOXYCYCLINE 100 MG: 100 CAPSULE ORAL at 09:39

## 2020-05-04 RX ADMIN — FINASTERIDE 5 MG: 5 TABLET, FILM COATED ORAL at 09:40

## 2020-05-04 RX ADMIN — LEVOTHYROXINE SODIUM 75 MCG: 75 TABLET ORAL at 06:29

## 2020-05-04 RX ADMIN — BACITRACIN ZINC 1 LARGE APPLICATION: 500 OINTMENT TOPICAL at 16:27

## 2020-05-04 RX ADMIN — OXYCODONE HYDROCHLORIDE 2.5 MG: 5 TABLET ORAL at 09:40

## 2020-05-04 RX ADMIN — OXYCODONE HYDROCHLORIDE 2.5 MG: 5 TABLET ORAL at 16:27

## 2020-05-05 VITALS
BODY MASS INDEX: 25.77 KG/M2 | HEART RATE: 50 BPM | WEIGHT: 190.26 LBS | RESPIRATION RATE: 22 BRPM | DIASTOLIC BLOOD PRESSURE: 83 MMHG | HEIGHT: 72 IN | OXYGEN SATURATION: 97 % | TEMPERATURE: 98.7 F | SYSTOLIC BLOOD PRESSURE: 124 MMHG

## 2020-05-05 PROBLEM — G93.49 ENCEPHALOPATHY DUE TO 2019 NOVEL CORONAVIRUS: Status: RESOLVED | Noted: 2020-05-05 | Resolved: 2020-05-05

## 2020-05-05 PROBLEM — G93.41 METABOLIC ENCEPHALOPATHY: Status: ACTIVE | Noted: 2020-05-05

## 2020-05-05 PROBLEM — G93.40 ENCEPHALOPATHY: Status: ACTIVE | Noted: 2020-05-05

## 2020-05-05 PROBLEM — G93.49 ENCEPHALOPATHY DUE TO 2019 NOVEL CORONAVIRUS: Status: ACTIVE | Noted: 2020-05-05

## 2020-05-05 PROBLEM — U07.1 ENCEPHALOPATHY DUE TO 2019 NOVEL CORONAVIRUS: Status: ACTIVE | Noted: 2020-05-05

## 2020-05-05 PROBLEM — U07.1 ENCEPHALOPATHY DUE TO 2019 NOVEL CORONAVIRUS: Status: RESOLVED | Noted: 2020-05-05 | Resolved: 2020-05-05

## 2020-05-05 LAB
ANION GAP SERPL CALCULATED.3IONS-SCNC: 4 MMOL/L (ref 4–13)
BUN SERPL-MCNC: 14 MG/DL (ref 5–25)
CALCIUM SERPL-MCNC: 7.5 MG/DL (ref 8.3–10.1)
CHLORIDE SERPL-SCNC: 112 MMOL/L (ref 100–108)
CO2 SERPL-SCNC: 26 MMOL/L (ref 21–32)
CREAT SERPL-MCNC: 1.05 MG/DL (ref 0.6–1.3)
ERYTHROCYTE [DISTWIDTH] IN BLOOD BY AUTOMATED COUNT: 15.4 % (ref 11.6–15.1)
GFR SERPL CREATININE-BSD FRML MDRD: 62 ML/MIN/1.73SQ M
GLUCOSE SERPL-MCNC: 83 MG/DL (ref 65–140)
HCT VFR BLD AUTO: 29.3 % (ref 36.5–49.3)
HGB BLD-MCNC: 9.4 G/DL (ref 12–17)
MCH RBC QN AUTO: 28.2 PG (ref 26.8–34.3)
MCHC RBC AUTO-ENTMCNC: 32.1 G/DL (ref 31.4–37.4)
MCV RBC AUTO: 88 FL (ref 82–98)
PLATELET # BLD AUTO: 158 THOUSANDS/UL (ref 149–390)
PMV BLD AUTO: 10.3 FL (ref 8.9–12.7)
POTASSIUM SERPL-SCNC: 3.6 MMOL/L (ref 3.5–5.3)
RBC # BLD AUTO: 3.33 MILLION/UL (ref 3.88–5.62)
SODIUM SERPL-SCNC: 142 MMOL/L (ref 136–145)
WBC # BLD AUTO: 4.39 THOUSAND/UL (ref 4.31–10.16)

## 2020-05-05 PROCEDURE — 85027 COMPLETE CBC AUTOMATED: CPT | Performed by: STUDENT IN AN ORGANIZED HEALTH CARE EDUCATION/TRAINING PROGRAM

## 2020-05-05 PROCEDURE — 92526 ORAL FUNCTION THERAPY: CPT

## 2020-05-05 PROCEDURE — 99238 HOSP IP/OBS DSCHRG MGMT 30/<: CPT | Performed by: INTERNAL MEDICINE

## 2020-05-05 PROCEDURE — 80048 BASIC METABOLIC PNL TOTAL CA: CPT | Performed by: STUDENT IN AN ORGANIZED HEALTH CARE EDUCATION/TRAINING PROGRAM

## 2020-05-05 RX ADMIN — CYANOCOBALAMIN TAB 500 MCG 1000 MCG: 500 TAB at 10:56

## 2020-05-05 RX ADMIN — BACITRACIN ZINC 1 LARGE APPLICATION: 500 OINTMENT TOPICAL at 11:33

## 2020-05-05 RX ADMIN — FINASTERIDE 5 MG: 5 TABLET, FILM COATED ORAL at 10:55

## 2020-05-05 RX ADMIN — MELATONIN 2000 UNITS: at 10:56

## 2020-05-05 RX ADMIN — LEVOTHYROXINE SODIUM 75 MCG: 75 TABLET ORAL at 05:39

## 2020-05-05 RX ADMIN — DOXYCYCLINE 100 MG: 100 CAPSULE ORAL at 10:55

## 2020-05-05 RX ADMIN — BISACODYL 10 MG: 10 SUPPOSITORY RECTAL at 10:57

## 2020-05-07 ENCOUNTER — APPOINTMENT (EMERGENCY)
Dept: RADIOLOGY | Facility: HOSPITAL | Age: 85
DRG: 177 | End: 2020-05-07
Payer: MEDICARE

## 2020-05-07 ENCOUNTER — HOSPITAL ENCOUNTER (INPATIENT)
Facility: HOSPITAL | Age: 85
LOS: 3 days | Discharge: NURSING, REHAB OR LONG-TERM CARE | DRG: 177 | End: 2020-05-10
Attending: EMERGENCY MEDICINE | Admitting: HOSPITALIST
Payer: MEDICARE

## 2020-05-07 DIAGNOSIS — N17.9 AKI (ACUTE KIDNEY INJURY) (HCC): ICD-10-CM

## 2020-05-07 DIAGNOSIS — M54.40 ACUTE MIDLINE LOW BACK PAIN WITH SCIATICA, SCIATICA LATERALITY UNSPECIFIED: ICD-10-CM

## 2020-05-07 DIAGNOSIS — U07.1 PNEUMONIA DUE TO COVID-19 VIRUS: ICD-10-CM

## 2020-05-07 DIAGNOSIS — S31.000A SACRAL WOUND: ICD-10-CM

## 2020-05-07 DIAGNOSIS — J12.82 PNEUMONIA DUE TO COVID-19 VIRUS: ICD-10-CM

## 2020-05-07 DIAGNOSIS — U07.1 COVID-19: Primary | ICD-10-CM

## 2020-05-07 DIAGNOSIS — L97.519: ICD-10-CM

## 2020-05-07 PROBLEM — S00.03XA SCALP HEMATOMA: Status: ACTIVE | Noted: 2020-05-07

## 2020-05-07 LAB
ALBUMIN SERPL BCP-MCNC: 1.9 G/DL (ref 3.5–5)
ALP SERPL-CCNC: 60 U/L (ref 46–116)
ALT SERPL W P-5'-P-CCNC: 12 U/L (ref 12–78)
ANION GAP SERPL CALCULATED.3IONS-SCNC: 5 MMOL/L (ref 4–13)
APTT PPP: 32 SECONDS (ref 23–37)
AST SERPL W P-5'-P-CCNC: 24 U/L (ref 5–45)
ATRIAL RATE: 234 BPM
BACTERIA BLD CULT: NORMAL
BASOPHILS # BLD AUTO: 0.01 THOUSANDS/ΜL (ref 0–0.1)
BASOPHILS NFR BLD AUTO: 0 % (ref 0–1)
BILIRUB SERPL-MCNC: 0.65 MG/DL (ref 0.2–1)
BUN SERPL-MCNC: 20 MG/DL (ref 5–25)
CALCIUM SERPL-MCNC: 7.2 MG/DL (ref 8.3–10.1)
CHLORIDE SERPL-SCNC: 110 MMOL/L (ref 100–108)
CK SERPL-CCNC: 74 U/L (ref 39–308)
CO2 SERPL-SCNC: 30 MMOL/L (ref 21–32)
CREAT SERPL-MCNC: 1.59 MG/DL (ref 0.6–1.3)
CRP SERPL HS-MCNC: 64.21 MG/L
D DIMER PPP FEU-MCNC: 5.94 UG/ML FEU
EOSINOPHIL # BLD AUTO: 0.01 THOUSAND/ΜL (ref 0–0.61)
EOSINOPHIL NFR BLD AUTO: 0 % (ref 0–6)
ERYTHROCYTE [DISTWIDTH] IN BLOOD BY AUTOMATED COUNT: 15.4 % (ref 11.6–15.1)
FERRITIN SERPL-MCNC: 300 NG/ML (ref 8–388)
FIBRINOGEN PPP-MCNC: 505 MG/DL (ref 227–495)
GFR SERPL CREATININE-BSD FRML MDRD: 37 ML/MIN/1.73SQ M
GLUCOSE SERPL-MCNC: 108 MG/DL (ref 65–140)
HCT VFR BLD AUTO: 33.2 % (ref 36.5–49.3)
HGB BLD-MCNC: 10.3 G/DL (ref 12–17)
IMM GRANULOCYTES # BLD AUTO: 0.03 THOUSAND/UL (ref 0–0.2)
IMM GRANULOCYTES NFR BLD AUTO: 0 % (ref 0–2)
INR PPP: 1.51 (ref 0.84–1.19)
LACTATE SERPL-SCNC: 1.8 MMOL/L (ref 0.5–2)
LACTATE SERPL-SCNC: 2.1 MMOL/L (ref 0.5–2)
LDH SERPL-CCNC: 347 U/L (ref 81–234)
LYMPHOCYTES # BLD AUTO: 0.94 THOUSANDS/ΜL (ref 0.6–4.47)
LYMPHOCYTES NFR BLD AUTO: 10 % (ref 14–44)
MCH RBC QN AUTO: 27.8 PG (ref 26.8–34.3)
MCHC RBC AUTO-ENTMCNC: 31 G/DL (ref 31.4–37.4)
MCV RBC AUTO: 90 FL (ref 82–98)
MONOCYTES # BLD AUTO: 0.73 THOUSAND/ΜL (ref 0.17–1.22)
MONOCYTES NFR BLD AUTO: 8 % (ref 4–12)
NEUTROPHILS # BLD AUTO: 7.9 THOUSANDS/ΜL (ref 1.85–7.62)
NEUTS SEG NFR BLD AUTO: 82 % (ref 43–75)
NRBC BLD AUTO-RTO: 0 /100 WBCS
NT-PROBNP SERPL-MCNC: ABNORMAL PG/ML
PLATELET # BLD AUTO: 215 THOUSANDS/UL (ref 149–390)
PMV BLD AUTO: 10.2 FL (ref 8.9–12.7)
POTASSIUM SERPL-SCNC: 4.4 MMOL/L (ref 3.5–5.3)
PROCALCITONIN SERPL-MCNC: 0.24 NG/ML
PROT SERPL-MCNC: 6 G/DL (ref 6.4–8.2)
PROTHROMBIN TIME: 17.5 SECONDS (ref 11.6–14.5)
QRS AXIS: 6 DEGREES
QRSD INTERVAL: 82 MS
QT INTERVAL: 360 MS
QTC INTERVAL: 423 MS
RBC # BLD AUTO: 3.7 MILLION/UL (ref 3.88–5.62)
SODIUM SERPL-SCNC: 145 MMOL/L (ref 136–145)
T WAVE AXIS: 8 DEGREES
TROPONIN I SERPL-MCNC: 0.03 NG/ML
VENTRICULAR RATE: 83 BPM
WBC # BLD AUTO: 9.62 THOUSAND/UL (ref 4.31–10.16)

## 2020-05-07 PROCEDURE — 85730 THROMBOPLASTIN TIME PARTIAL: CPT | Performed by: EMERGENCY MEDICINE

## 2020-05-07 PROCEDURE — 85610 PROTHROMBIN TIME: CPT | Performed by: EMERGENCY MEDICINE

## 2020-05-07 PROCEDURE — 84145 PROCALCITONIN (PCT): CPT | Performed by: EMERGENCY MEDICINE

## 2020-05-07 PROCEDURE — 84484 ASSAY OF TROPONIN QUANT: CPT | Performed by: EMERGENCY MEDICINE

## 2020-05-07 PROCEDURE — 99285 EMERGENCY DEPT VISIT HI MDM: CPT

## 2020-05-07 PROCEDURE — 85025 COMPLETE CBC W/AUTO DIFF WBC: CPT | Performed by: EMERGENCY MEDICINE

## 2020-05-07 PROCEDURE — 82728 ASSAY OF FERRITIN: CPT | Performed by: EMERGENCY MEDICINE

## 2020-05-07 PROCEDURE — 85384 FIBRINOGEN ACTIVITY: CPT | Performed by: EMERGENCY MEDICINE

## 2020-05-07 PROCEDURE — 80053 COMPREHEN METABOLIC PANEL: CPT | Performed by: EMERGENCY MEDICINE

## 2020-05-07 PROCEDURE — 83605 ASSAY OF LACTIC ACID: CPT | Performed by: EMERGENCY MEDICINE

## 2020-05-07 PROCEDURE — 93005 ELECTROCARDIOGRAM TRACING: CPT

## 2020-05-07 PROCEDURE — 83880 ASSAY OF NATRIURETIC PEPTIDE: CPT | Performed by: EMERGENCY MEDICINE

## 2020-05-07 PROCEDURE — 99285 EMERGENCY DEPT VISIT HI MDM: CPT | Performed by: EMERGENCY MEDICINE

## 2020-05-07 PROCEDURE — 87147 CULTURE TYPE IMMUNOLOGIC: CPT | Performed by: EMERGENCY MEDICINE

## 2020-05-07 PROCEDURE — 93010 ELECTROCARDIOGRAM REPORT: CPT | Performed by: INTERNAL MEDICINE

## 2020-05-07 PROCEDURE — 71045 X-RAY EXAM CHEST 1 VIEW: CPT

## 2020-05-07 PROCEDURE — 83615 LACTATE (LD) (LDH) ENZYME: CPT | Performed by: EMERGENCY MEDICINE

## 2020-05-07 PROCEDURE — 87040 BLOOD CULTURE FOR BACTERIA: CPT | Performed by: EMERGENCY MEDICINE

## 2020-05-07 PROCEDURE — 85379 FIBRIN DEGRADATION QUANT: CPT | Performed by: EMERGENCY MEDICINE

## 2020-05-07 PROCEDURE — 96360 HYDRATION IV INFUSION INIT: CPT

## 2020-05-07 PROCEDURE — 83520 IMMUNOASSAY QUANT NOS NONAB: CPT | Performed by: EMERGENCY MEDICINE

## 2020-05-07 PROCEDURE — 82550 ASSAY OF CK (CPK): CPT | Performed by: EMERGENCY MEDICINE

## 2020-05-07 PROCEDURE — 99223 1ST HOSP IP/OBS HIGH 75: CPT | Performed by: HOSPITALIST

## 2020-05-07 PROCEDURE — 86141 C-REACTIVE PROTEIN HS: CPT | Performed by: EMERGENCY MEDICINE

## 2020-05-07 PROCEDURE — 36415 COLL VENOUS BLD VENIPUNCTURE: CPT | Performed by: EMERGENCY MEDICINE

## 2020-05-07 RX ORDER — ACETAMINOPHEN 325 MG/1
650 TABLET ORAL EVERY 6 HOURS PRN
Status: DISCONTINUED | OUTPATIENT
Start: 2020-05-07 | End: 2020-05-07

## 2020-05-07 RX ORDER — ACETAMINOPHEN 650 MG/1
650 SUPPOSITORY RECTAL ONCE
Status: COMPLETED | OUTPATIENT
Start: 2020-05-07 | End: 2020-05-07

## 2020-05-07 RX ORDER — GABAPENTIN 100 MG/1
100 CAPSULE ORAL
Status: DISCONTINUED | OUTPATIENT
Start: 2020-05-07 | End: 2020-05-10 | Stop reason: HOSPADM

## 2020-05-07 RX ORDER — ATROPINE SULFATE 10 MG/ML
2 SOLUTION/ DROPS OPHTHALMIC EVERY 4 HOURS PRN
COMMUNITY
End: 2020-06-10 | Stop reason: HOSPADM

## 2020-05-07 RX ORDER — DOCUSATE SODIUM 100 MG/1
100 CAPSULE, LIQUID FILLED ORAL 2 TIMES DAILY
Status: DISCONTINUED | OUTPATIENT
Start: 2020-05-07 | End: 2020-05-10 | Stop reason: HOSPADM

## 2020-05-07 RX ORDER — SODIUM CHLORIDE 9 MG/ML
3 INJECTION INTRAVENOUS
Status: DISCONTINUED | OUTPATIENT
Start: 2020-05-07 | End: 2020-05-10 | Stop reason: HOSPADM

## 2020-05-07 RX ORDER — FINASTERIDE 5 MG/1
5 TABLET, FILM COATED ORAL DAILY
Status: DISCONTINUED | OUTPATIENT
Start: 2020-05-07 | End: 2020-05-10 | Stop reason: HOSPADM

## 2020-05-07 RX ORDER — MELATONIN
2000 DAILY
Status: DISCONTINUED | OUTPATIENT
Start: 2020-05-07 | End: 2020-05-10 | Stop reason: HOSPADM

## 2020-05-07 RX ORDER — HYDROXYCHLOROQUINE SULFATE 200 MG/1
200 TABLET, FILM COATED ORAL EVERY 12 HOURS
Status: DISCONTINUED | OUTPATIENT
Start: 2020-05-08 | End: 2020-05-10 | Stop reason: HOSPADM

## 2020-05-07 RX ORDER — ACETAMINOPHEN 650 MG/1
650 SUPPOSITORY RECTAL EVERY 6 HOURS PRN
Status: DISCONTINUED | OUTPATIENT
Start: 2020-05-07 | End: 2020-05-10 | Stop reason: HOSPADM

## 2020-05-07 RX ORDER — SENNOSIDES 8.6 MG
1 TABLET ORAL DAILY
Status: DISCONTINUED | OUTPATIENT
Start: 2020-05-07 | End: 2020-05-10 | Stop reason: HOSPADM

## 2020-05-07 RX ORDER — HYDROXYCHLOROQUINE SULFATE 200 MG/1
200 TABLET, FILM COATED ORAL EVERY 12 HOURS
Status: DISCONTINUED | OUTPATIENT
Start: 2020-05-08 | End: 2020-05-07

## 2020-05-07 RX ORDER — HYDROXYCHLOROQUINE SULFATE 200 MG/1
800 TABLET, FILM COATED ORAL EVERY 24 HOURS
Status: COMPLETED | OUTPATIENT
Start: 2020-05-07 | End: 2020-05-07

## 2020-05-07 RX ORDER — LEVOTHYROXINE SODIUM 0.07 MG/1
75 TABLET ORAL
Status: DISCONTINUED | OUTPATIENT
Start: 2020-05-08 | End: 2020-05-10 | Stop reason: HOSPADM

## 2020-05-07 RX ORDER — TAMSULOSIN HYDROCHLORIDE 0.4 MG/1
0.4 CAPSULE ORAL EVERY EVENING
Status: DISCONTINUED | OUTPATIENT
Start: 2020-05-07 | End: 2020-05-10 | Stop reason: HOSPADM

## 2020-05-07 RX ORDER — DOXYCYCLINE HYCLATE 100 MG/1
100 CAPSULE ORAL EVERY 12 HOURS SCHEDULED
Status: DISCONTINUED | OUTPATIENT
Start: 2020-05-07 | End: 2020-05-10 | Stop reason: HOSPADM

## 2020-05-07 RX ADMIN — TAMSULOSIN HYDROCHLORIDE 0.4 MG: 0.4 CAPSULE ORAL at 17:10

## 2020-05-07 RX ADMIN — DOXYCYCLINE 100 MG: 100 CAPSULE ORAL at 21:17

## 2020-05-07 RX ADMIN — SODIUM CHLORIDE 1000 ML: 0.9 INJECTION, SOLUTION INTRAVENOUS at 11:33

## 2020-05-07 RX ADMIN — ACETAMINOPHEN 650 MG: 650 SUPPOSITORY RECTAL at 11:28

## 2020-05-07 RX ADMIN — GABAPENTIN 100 MG: 100 CAPSULE ORAL at 21:17

## 2020-05-07 RX ADMIN — DOCUSATE SODIUM 100 MG: 100 CAPSULE, LIQUID FILLED ORAL at 17:10

## 2020-05-07 RX ADMIN — APIXABAN 2.5 MG: 2.5 TABLET, FILM COATED ORAL at 17:10

## 2020-05-07 RX ADMIN — HYDROXYCHLOROQUINE SULFATE 800 MG: 200 TABLET, FILM COATED ORAL at 17:10

## 2020-05-08 LAB
ALBUMIN SERPL BCP-MCNC: 1.8 G/DL (ref 3.5–5)
ALP SERPL-CCNC: 55 U/L (ref 46–116)
ALT SERPL W P-5'-P-CCNC: 9 U/L (ref 12–78)
ANION GAP SERPL CALCULATED.3IONS-SCNC: 9 MMOL/L (ref 4–13)
AST SERPL W P-5'-P-CCNC: 21 U/L (ref 5–45)
BACTERIA BLD CULT: NORMAL
BACTERIA BLD CULT: NORMAL
BASOPHILS # BLD AUTO: 0.01 THOUSANDS/ΜL (ref 0–0.1)
BASOPHILS NFR BLD AUTO: 0 % (ref 0–1)
BILIRUB SERPL-MCNC: 0.65 MG/DL (ref 0.2–1)
BUN SERPL-MCNC: 20 MG/DL (ref 5–25)
CALCIUM SERPL-MCNC: 7.1 MG/DL (ref 8.3–10.1)
CHLORIDE SERPL-SCNC: 113 MMOL/L (ref 100–108)
CO2 SERPL-SCNC: 25 MMOL/L (ref 21–32)
CREAT SERPL-MCNC: 1.33 MG/DL (ref 0.6–1.3)
EOSINOPHIL # BLD AUTO: 0.09 THOUSAND/ΜL (ref 0–0.61)
EOSINOPHIL NFR BLD AUTO: 1 % (ref 0–6)
ERYTHROCYTE [DISTWIDTH] IN BLOOD BY AUTOMATED COUNT: 15.5 % (ref 11.6–15.1)
GFR SERPL CREATININE-BSD FRML MDRD: 46 ML/MIN/1.73SQ M
GLUCOSE SERPL-MCNC: 100 MG/DL (ref 65–140)
HCT VFR BLD AUTO: 29.7 % (ref 36.5–49.3)
HGB BLD-MCNC: 9.1 G/DL (ref 12–17)
IMM GRANULOCYTES # BLD AUTO: 0.04 THOUSAND/UL (ref 0–0.2)
IMM GRANULOCYTES NFR BLD AUTO: 1 % (ref 0–2)
LYMPHOCYTES # BLD AUTO: 0.65 THOUSANDS/ΜL (ref 0.6–4.47)
LYMPHOCYTES NFR BLD AUTO: 10 % (ref 14–44)
MCH RBC QN AUTO: 27.2 PG (ref 26.8–34.3)
MCHC RBC AUTO-ENTMCNC: 30.6 G/DL (ref 31.4–37.4)
MCV RBC AUTO: 89 FL (ref 82–98)
MONOCYTES # BLD AUTO: 0.54 THOUSAND/ΜL (ref 0.17–1.22)
MONOCYTES NFR BLD AUTO: 8 % (ref 4–12)
NEUTROPHILS # BLD AUTO: 5.52 THOUSANDS/ΜL (ref 1.85–7.62)
NEUTS SEG NFR BLD AUTO: 80 % (ref 43–75)
NRBC BLD AUTO-RTO: 0 /100 WBCS
PLATELET # BLD AUTO: 189 THOUSANDS/UL (ref 149–390)
PMV BLD AUTO: 9.9 FL (ref 8.9–12.7)
POTASSIUM SERPL-SCNC: 4.1 MMOL/L (ref 3.5–5.3)
PROCALCITONIN SERPL-MCNC: 0.12 NG/ML
PROT SERPL-MCNC: 5.6 G/DL (ref 6.4–8.2)
RBC # BLD AUTO: 3.34 MILLION/UL (ref 3.88–5.62)
SODIUM SERPL-SCNC: 147 MMOL/L (ref 136–145)
WBC # BLD AUTO: 6.85 THOUSAND/UL (ref 4.31–10.16)

## 2020-05-08 PROCEDURE — 80053 COMPREHEN METABOLIC PANEL: CPT | Performed by: PHYSICIAN ASSISTANT

## 2020-05-08 PROCEDURE — 92610 EVALUATE SWALLOWING FUNCTION: CPT

## 2020-05-08 PROCEDURE — 85025 COMPLETE CBC W/AUTO DIFF WBC: CPT | Performed by: PHYSICIAN ASSISTANT

## 2020-05-08 PROCEDURE — 84145 PROCALCITONIN (PCT): CPT | Performed by: PHYSICIAN ASSISTANT

## 2020-05-08 PROCEDURE — 99232 SBSQ HOSP IP/OBS MODERATE 35: CPT | Performed by: PHYSICIAN ASSISTANT

## 2020-05-08 RX ADMIN — NYSTATIN 500000 UNITS: 100000 SUSPENSION ORAL at 18:35

## 2020-05-08 RX ADMIN — HYDROXYCHLOROQUINE SULFATE 200 MG: 200 TABLET, FILM COATED ORAL at 21:04

## 2020-05-08 RX ADMIN — GABAPENTIN 100 MG: 100 CAPSULE ORAL at 21:04

## 2020-05-08 RX ADMIN — HYDROXYCHLOROQUINE SULFATE 200 MG: 200 TABLET, FILM COATED ORAL at 09:04

## 2020-05-08 RX ADMIN — CYANOCOBALAMIN TAB 500 MCG 1000 MCG: 500 TAB at 09:04

## 2020-05-08 RX ADMIN — TAMSULOSIN HYDROCHLORIDE 0.4 MG: 0.4 CAPSULE ORAL at 17:22

## 2020-05-08 RX ADMIN — DOXYCYCLINE 100 MG: 100 CAPSULE ORAL at 21:04

## 2020-05-08 RX ADMIN — NYSTATIN 500000 UNITS: 100000 SUSPENSION ORAL at 21:04

## 2020-05-08 RX ADMIN — DOXYCYCLINE 100 MG: 100 CAPSULE ORAL at 09:04

## 2020-05-08 RX ADMIN — APIXABAN 2.5 MG: 2.5 TABLET, FILM COATED ORAL at 09:03

## 2020-05-08 RX ADMIN — DOCUSATE SODIUM 100 MG: 100 CAPSULE, LIQUID FILLED ORAL at 09:10

## 2020-05-08 RX ADMIN — APIXABAN 2.5 MG: 2.5 TABLET, FILM COATED ORAL at 17:22

## 2020-05-08 RX ADMIN — FINASTERIDE 5 MG: 5 TABLET, FILM COATED ORAL at 09:04

## 2020-05-08 RX ADMIN — ACETAMINOPHEN 650 MG: 650 SUPPOSITORY RECTAL at 11:39

## 2020-05-08 RX ADMIN — MELATONIN 2000 UNITS: at 09:04

## 2020-05-08 RX ADMIN — SENNOSIDES 8.6 MG: 8.6 TABLET, FILM COATED ORAL at 09:04

## 2020-05-08 RX ADMIN — LEVOTHYROXINE SODIUM 75 MCG: 75 TABLET ORAL at 06:09

## 2020-05-09 PROBLEM — T07.XXXA MULTIPLE WOUNDS: Status: ACTIVE | Noted: 2020-04-21

## 2020-05-09 PROBLEM — R78.81 POSITIVE BLOOD CULTURE: Status: ACTIVE | Noted: 2020-05-09

## 2020-05-09 LAB
ALBUMIN SERPL BCP-MCNC: 1.9 G/DL (ref 3.5–5)
ALP SERPL-CCNC: 56 U/L (ref 46–116)
ALT SERPL W P-5'-P-CCNC: 11 U/L (ref 12–78)
ANION GAP SERPL CALCULATED.3IONS-SCNC: 6 MMOL/L (ref 4–13)
AST SERPL W P-5'-P-CCNC: 18 U/L (ref 5–45)
ATRIAL RATE: 39 BPM
ATRIAL RATE: 61 BPM
BILIRUB SERPL-MCNC: 0.43 MG/DL (ref 0.2–1)
BUN SERPL-MCNC: 19 MG/DL (ref 5–25)
CALCIUM SERPL-MCNC: 7.6 MG/DL (ref 8.3–10.1)
CHLORIDE SERPL-SCNC: 116 MMOL/L (ref 100–108)
CO2 SERPL-SCNC: 28 MMOL/L (ref 21–32)
CREAT SERPL-MCNC: 1.32 MG/DL (ref 0.6–1.3)
GFR SERPL CREATININE-BSD FRML MDRD: 47 ML/MIN/1.73SQ M
GLUCOSE SERPL-MCNC: 112 MG/DL (ref 65–140)
IL6 SERPL-MCNC: 70.7 PG/ML (ref 0–12.2)
POTASSIUM SERPL-SCNC: 4.9 MMOL/L (ref 3.5–5.3)
PROT SERPL-MCNC: 5.8 G/DL (ref 6.4–8.2)
QRS AXIS: 11 DEGREES
QRS AXIS: 9 DEGREES
QRSD INTERVAL: 84 MS
QRSD INTERVAL: 84 MS
QT INTERVAL: 452 MS
QT INTERVAL: 458 MS
QTC INTERVAL: 443 MS
QTC INTERVAL: 458 MS
SODIUM SERPL-SCNC: 150 MMOL/L (ref 136–145)
T WAVE AXIS: -2 DEGREES
T WAVE AXIS: 22 DEGREES
VENTRICULAR RATE: 58 BPM
VENTRICULAR RATE: 60 BPM

## 2020-05-09 PROCEDURE — 93005 ELECTROCARDIOGRAM TRACING: CPT

## 2020-05-09 PROCEDURE — 99232 SBSQ HOSP IP/OBS MODERATE 35: CPT | Performed by: PHYSICIAN ASSISTANT

## 2020-05-09 PROCEDURE — 93010 ELECTROCARDIOGRAM REPORT: CPT | Performed by: INTERNAL MEDICINE

## 2020-05-09 PROCEDURE — 80053 COMPREHEN METABOLIC PANEL: CPT | Performed by: PHYSICIAN ASSISTANT

## 2020-05-09 PROCEDURE — 92526 ORAL FUNCTION THERAPY: CPT

## 2020-05-09 RX ORDER — DEXTROSE MONOHYDRATE 50 MG/ML
75 INJECTION, SOLUTION INTRAVENOUS CONTINUOUS
Status: DISPENSED | OUTPATIENT
Start: 2020-05-09 | End: 2020-05-10

## 2020-05-09 RX ADMIN — TAMSULOSIN HYDROCHLORIDE 0.4 MG: 0.4 CAPSULE ORAL at 17:03

## 2020-05-09 RX ADMIN — FINASTERIDE 5 MG: 5 TABLET, FILM COATED ORAL at 08:26

## 2020-05-09 RX ADMIN — HYDROXYCHLOROQUINE SULFATE 200 MG: 200 TABLET, FILM COATED ORAL at 08:27

## 2020-05-09 RX ADMIN — APIXABAN 2.5 MG: 2.5 TABLET, FILM COATED ORAL at 08:27

## 2020-05-09 RX ADMIN — GABAPENTIN 100 MG: 100 CAPSULE ORAL at 21:22

## 2020-05-09 RX ADMIN — SENNOSIDES 8.6 MG: 8.6 TABLET, FILM COATED ORAL at 08:27

## 2020-05-09 RX ADMIN — CYANOCOBALAMIN TAB 500 MCG 1000 MCG: 500 TAB at 08:27

## 2020-05-09 RX ADMIN — DOXYCYCLINE 100 MG: 100 CAPSULE ORAL at 21:22

## 2020-05-09 RX ADMIN — LEVOTHYROXINE SODIUM 75 MCG: 75 TABLET ORAL at 05:55

## 2020-05-09 RX ADMIN — HYDROXYCHLOROQUINE SULFATE 200 MG: 200 TABLET, FILM COATED ORAL at 21:22

## 2020-05-09 RX ADMIN — NYSTATIN 500000 UNITS: 100000 SUSPENSION ORAL at 08:27

## 2020-05-09 RX ADMIN — DOXYCYCLINE 100 MG: 100 CAPSULE ORAL at 08:27

## 2020-05-09 RX ADMIN — NYSTATIN 500000 UNITS: 100000 SUSPENSION ORAL at 11:47

## 2020-05-09 RX ADMIN — APIXABAN 2.5 MG: 2.5 TABLET, FILM COATED ORAL at 17:03

## 2020-05-09 RX ADMIN — DEXTROSE 75 ML/HR: 5 SOLUTION INTRAVENOUS at 17:02

## 2020-05-09 RX ADMIN — MELATONIN 2000 UNITS: at 08:26

## 2020-05-09 RX ADMIN — NYSTATIN 500000 UNITS: 100000 SUSPENSION ORAL at 17:03

## 2020-05-09 RX ADMIN — DOCUSATE SODIUM 100 MG: 100 CAPSULE, LIQUID FILLED ORAL at 08:27

## 2020-05-10 VITALS
HEART RATE: 56 BPM | BODY MASS INDEX: 24.43 KG/M2 | TEMPERATURE: 97.7 F | RESPIRATION RATE: 20 BRPM | DIASTOLIC BLOOD PRESSURE: 52 MMHG | WEIGHT: 180.12 LBS | SYSTOLIC BLOOD PRESSURE: 110 MMHG | OXYGEN SATURATION: 96 %

## 2020-05-10 LAB
ANION GAP SERPL CALCULATED.3IONS-SCNC: 5 MMOL/L (ref 4–13)
BACTERIA BLD CULT: ABNORMAL
BUN SERPL-MCNC: 19 MG/DL (ref 5–25)
CALCIUM SERPL-MCNC: 7.2 MG/DL (ref 8.3–10.1)
CHLORIDE SERPL-SCNC: 113 MMOL/L (ref 100–108)
CO2 SERPL-SCNC: 29 MMOL/L (ref 21–32)
CREAT SERPL-MCNC: 1.25 MG/DL (ref 0.6–1.3)
ERYTHROCYTE [DISTWIDTH] IN BLOOD BY AUTOMATED COUNT: 15.4 % (ref 11.6–15.1)
GFR SERPL CREATININE-BSD FRML MDRD: 50 ML/MIN/1.73SQ M
GLUCOSE SERPL-MCNC: 111 MG/DL (ref 65–140)
GRAM STN SPEC: ABNORMAL
HCT VFR BLD AUTO: 29.6 % (ref 36.5–49.3)
HGB BLD-MCNC: 9 G/DL (ref 12–17)
MCH RBC QN AUTO: 27.6 PG (ref 26.8–34.3)
MCHC RBC AUTO-ENTMCNC: 30.4 G/DL (ref 31.4–37.4)
MCV RBC AUTO: 91 FL (ref 82–98)
PLATELET # BLD AUTO: 200 THOUSANDS/UL (ref 149–390)
PMV BLD AUTO: 9.8 FL (ref 8.9–12.7)
POTASSIUM SERPL-SCNC: 4 MMOL/L (ref 3.5–5.3)
RBC # BLD AUTO: 3.26 MILLION/UL (ref 3.88–5.62)
SODIUM SERPL-SCNC: 147 MMOL/L (ref 136–145)
WBC # BLD AUTO: 5.62 THOUSAND/UL (ref 4.31–10.16)

## 2020-05-10 PROCEDURE — 85027 COMPLETE CBC AUTOMATED: CPT | Performed by: PHYSICIAN ASSISTANT

## 2020-05-10 PROCEDURE — 99239 HOSP IP/OBS DSCHRG MGMT >30: CPT | Performed by: NURSE PRACTITIONER

## 2020-05-10 PROCEDURE — 80048 BASIC METABOLIC PNL TOTAL CA: CPT | Performed by: PHYSICIAN ASSISTANT

## 2020-05-10 RX ORDER — TRAMADOL HYDROCHLORIDE 50 MG/1
25 TABLET ORAL EVERY 6 HOURS PRN
Qty: 30 TABLET | Refills: 0 | Status: SHIPPED | OUTPATIENT
Start: 2020-05-10 | End: 2020-05-20

## 2020-05-10 RX ORDER — HYDROXYCHLOROQUINE SULFATE 200 MG/1
200 TABLET, FILM COATED ORAL EVERY 12 HOURS
Qty: 7 TABLET | Refills: 0
Start: 2020-05-10 | End: 2020-06-10 | Stop reason: HOSPADM

## 2020-05-10 RX ADMIN — LEVOTHYROXINE SODIUM 75 MCG: 75 TABLET ORAL at 05:20

## 2020-05-10 RX ADMIN — HYDROXYCHLOROQUINE SULFATE 200 MG: 200 TABLET, FILM COATED ORAL at 08:18

## 2020-05-10 RX ADMIN — FINASTERIDE 5 MG: 5 TABLET, FILM COATED ORAL at 08:18

## 2020-05-10 RX ADMIN — DOXYCYCLINE 100 MG: 100 CAPSULE ORAL at 08:27

## 2020-05-10 RX ADMIN — NYSTATIN 500000 UNITS: 100000 SUSPENSION ORAL at 08:18

## 2020-05-10 RX ADMIN — MELATONIN 2000 UNITS: at 08:18

## 2020-05-10 RX ADMIN — CYANOCOBALAMIN TAB 500 MCG 1000 MCG: 500 TAB at 08:18

## 2020-05-10 RX ADMIN — APIXABAN 2.5 MG: 2.5 TABLET, FILM COATED ORAL at 08:18

## 2020-05-12 LAB — BACTERIA BLD CULT: NORMAL

## 2020-05-29 ENCOUNTER — HOSPITAL ENCOUNTER (INPATIENT)
Facility: HOSPITAL | Age: 85
LOS: 12 days | Discharge: NURSING, REHAB OR LONG-TERM CARE | DRG: 871 | End: 2020-06-10
Attending: EMERGENCY MEDICINE | Admitting: INTERNAL MEDICINE
Payer: MEDICARE

## 2020-05-29 ENCOUNTER — APPOINTMENT (EMERGENCY)
Dept: RADIOLOGY | Facility: HOSPITAL | Age: 85
DRG: 871 | End: 2020-05-29
Payer: MEDICARE

## 2020-05-29 DIAGNOSIS — T07.XXXA MULTIPLE WOUNDS: ICD-10-CM

## 2020-05-29 DIAGNOSIS — U07.1 COVID-19: ICD-10-CM

## 2020-05-29 DIAGNOSIS — R52 PAIN: ICD-10-CM

## 2020-05-29 DIAGNOSIS — A41.9 SEPSIS (HCC): Primary | ICD-10-CM

## 2020-05-29 DIAGNOSIS — T07.XXXA WOUNDS, MULTIPLE: ICD-10-CM

## 2020-05-29 DIAGNOSIS — Z71.89 GOALS OF CARE, COUNSELING/DISCUSSION: ICD-10-CM

## 2020-05-29 LAB
ALBUMIN SERPL BCP-MCNC: 2.1 G/DL (ref 3.5–5)
ALP SERPL-CCNC: 69 U/L (ref 46–116)
ALT SERPL W P-5'-P-CCNC: 11 U/L (ref 12–78)
ANION GAP SERPL CALCULATED.3IONS-SCNC: 11 MMOL/L (ref 4–13)
APTT PPP: 34 SECONDS (ref 23–37)
AST SERPL W P-5'-P-CCNC: 36 U/L (ref 5–45)
ATRIAL RATE: 72 BPM
BASOPHILS # BLD AUTO: 0.03 THOUSANDS/ΜL (ref 0–0.1)
BASOPHILS NFR BLD AUTO: 0 % (ref 0–1)
BILIRUB SERPL-MCNC: 0.88 MG/DL (ref 0.2–1)
BUN SERPL-MCNC: 28 MG/DL (ref 5–25)
CALCIUM SERPL-MCNC: 8.3 MG/DL (ref 8.3–10.1)
CHLORIDE SERPL-SCNC: 109 MMOL/L (ref 100–108)
CO2 SERPL-SCNC: 29 MMOL/L (ref 21–32)
CREAT SERPL-MCNC: 1.42 MG/DL (ref 0.6–1.3)
EOSINOPHIL # BLD AUTO: 0.01 THOUSAND/ΜL (ref 0–0.61)
EOSINOPHIL NFR BLD AUTO: 0 % (ref 0–6)
ERYTHROCYTE [DISTWIDTH] IN BLOOD BY AUTOMATED COUNT: 18.6 % (ref 11.6–15.1)
GFR SERPL CREATININE-BSD FRML MDRD: 43 ML/MIN/1.73SQ M
GLUCOSE SERPL-MCNC: 104 MG/DL (ref 65–140)
HCT VFR BLD AUTO: 34.4 % (ref 36.5–49.3)
HGB BLD-MCNC: 10.3 G/DL (ref 12–17)
IMM GRANULOCYTES # BLD AUTO: 0.04 THOUSAND/UL (ref 0–0.2)
IMM GRANULOCYTES NFR BLD AUTO: 0 % (ref 0–2)
INR PPP: 1.91 (ref 0.84–1.19)
LACTATE SERPL-SCNC: 2 MMOL/L (ref 0.5–2)
LYMPHOCYTES # BLD AUTO: 0.84 THOUSANDS/ΜL (ref 0.6–4.47)
LYMPHOCYTES NFR BLD AUTO: 7 % (ref 14–44)
MCH RBC QN AUTO: 28.5 PG (ref 26.8–34.3)
MCHC RBC AUTO-ENTMCNC: 29.9 G/DL (ref 31.4–37.4)
MCV RBC AUTO: 95 FL (ref 82–98)
MONOCYTES # BLD AUTO: 0.7 THOUSAND/ΜL (ref 0.17–1.22)
MONOCYTES NFR BLD AUTO: 6 % (ref 4–12)
NEUTROPHILS # BLD AUTO: 10.89 THOUSANDS/ΜL (ref 1.85–7.62)
NEUTS SEG NFR BLD AUTO: 87 % (ref 43–75)
NRBC BLD AUTO-RTO: 0 /100 WBCS
NT-PROBNP SERPL-MCNC: 9577 PG/ML
PLATELET # BLD AUTO: 184 THOUSANDS/UL (ref 149–390)
PMV BLD AUTO: 10.8 FL (ref 8.9–12.7)
POTASSIUM SERPL-SCNC: 5.2 MMOL/L (ref 3.5–5.3)
PROCALCITONIN SERPL-MCNC: 1.02 NG/ML
PROT SERPL-MCNC: 7 G/DL (ref 6.4–8.2)
PROTHROMBIN TIME: 21 SECONDS (ref 11.6–14.5)
QRS AXIS: -3 DEGREES
QRSD INTERVAL: 80 MS
QT INTERVAL: 318 MS
QTC INTERVAL: 414 MS
RBC # BLD AUTO: 3.62 MILLION/UL (ref 3.88–5.62)
SARS-COV-2 RNA RESP QL NAA+PROBE: POSITIVE
SODIUM SERPL-SCNC: 149 MMOL/L (ref 136–145)
T WAVE AXIS: 22 DEGREES
TROPONIN I SERPL-MCNC: 0.02 NG/ML
VENTRICULAR RATE: 102 BPM
WBC # BLD AUTO: 12.51 THOUSAND/UL (ref 4.31–10.16)

## 2020-05-29 PROCEDURE — 83605 ASSAY OF LACTIC ACID: CPT | Performed by: EMERGENCY MEDICINE

## 2020-05-29 PROCEDURE — 93010 ELECTROCARDIOGRAM REPORT: CPT | Performed by: INTERNAL MEDICINE

## 2020-05-29 PROCEDURE — 96365 THER/PROPH/DIAG IV INF INIT: CPT

## 2020-05-29 PROCEDURE — 85610 PROTHROMBIN TIME: CPT | Performed by: EMERGENCY MEDICINE

## 2020-05-29 PROCEDURE — 99285 EMERGENCY DEPT VISIT HI MDM: CPT | Performed by: EMERGENCY MEDICINE

## 2020-05-29 PROCEDURE — 71045 X-RAY EXAM CHEST 1 VIEW: CPT

## 2020-05-29 PROCEDURE — 85025 COMPLETE CBC W/AUTO DIFF WBC: CPT | Performed by: EMERGENCY MEDICINE

## 2020-05-29 PROCEDURE — 85730 THROMBOPLASTIN TIME PARTIAL: CPT | Performed by: EMERGENCY MEDICINE

## 2020-05-29 PROCEDURE — 87635 SARS-COV-2 COVID-19 AMP PRB: CPT | Performed by: EMERGENCY MEDICINE

## 2020-05-29 PROCEDURE — 99285 EMERGENCY DEPT VISIT HI MDM: CPT

## 2020-05-29 PROCEDURE — 96367 TX/PROPH/DG ADDL SEQ IV INF: CPT

## 2020-05-29 PROCEDURE — 84145 PROCALCITONIN (PCT): CPT | Performed by: EMERGENCY MEDICINE

## 2020-05-29 PROCEDURE — 83880 ASSAY OF NATRIURETIC PEPTIDE: CPT | Performed by: EMERGENCY MEDICINE

## 2020-05-29 PROCEDURE — 36415 COLL VENOUS BLD VENIPUNCTURE: CPT | Performed by: EMERGENCY MEDICINE

## 2020-05-29 PROCEDURE — 84484 ASSAY OF TROPONIN QUANT: CPT | Performed by: EMERGENCY MEDICINE

## 2020-05-29 PROCEDURE — 99223 1ST HOSP IP/OBS HIGH 75: CPT | Performed by: INTERNAL MEDICINE

## 2020-05-29 PROCEDURE — 80053 COMPREHEN METABOLIC PANEL: CPT | Performed by: EMERGENCY MEDICINE

## 2020-05-29 PROCEDURE — 87040 BLOOD CULTURE FOR BACTERIA: CPT | Performed by: EMERGENCY MEDICINE

## 2020-05-29 PROCEDURE — 93005 ELECTROCARDIOGRAM TRACING: CPT

## 2020-05-29 RX ORDER — LEVOTHYROXINE SODIUM 0.07 MG/1
75 TABLET ORAL
Status: DISCONTINUED | OUTPATIENT
Start: 2020-05-30 | End: 2020-05-29

## 2020-05-29 RX ORDER — GABAPENTIN 100 MG/1
100 CAPSULE ORAL
Status: DISCONTINUED | OUTPATIENT
Start: 2020-05-29 | End: 2020-05-29

## 2020-05-29 RX ORDER — TAMSULOSIN HYDROCHLORIDE 0.4 MG/1
0.4 CAPSULE ORAL
Status: DISCONTINUED | OUTPATIENT
Start: 2020-05-29 | End: 2020-05-29

## 2020-05-29 RX ORDER — HYDROMORPHONE HCL/PF 1 MG/ML
0.5 SYRINGE (ML) INJECTION
Status: DISCONTINUED | OUTPATIENT
Start: 2020-05-29 | End: 2020-05-30

## 2020-05-29 RX ORDER — MELATONIN
2000 DAILY
Status: DISCONTINUED | OUTPATIENT
Start: 2020-05-30 | End: 2020-05-29

## 2020-05-29 RX ORDER — ACETAMINOPHEN 650 MG/1
650 SUPPOSITORY RECTAL EVERY 4 HOURS PRN
Status: DISCONTINUED | OUTPATIENT
Start: 2020-05-29 | End: 2020-06-09

## 2020-05-29 RX ORDER — FINASTERIDE 5 MG/1
5 TABLET, FILM COATED ORAL DAILY
Status: DISCONTINUED | OUTPATIENT
Start: 2020-05-30 | End: 2020-05-29

## 2020-05-29 RX ORDER — LORAZEPAM 2 MG/ML
0.5 INJECTION INTRAMUSCULAR EVERY 4 HOURS PRN
Status: DISCONTINUED | OUTPATIENT
Start: 2020-05-29 | End: 2020-05-30

## 2020-05-29 RX ORDER — BISACODYL 10 MG
10 SUPPOSITORY, RECTAL RECTAL DAILY
Status: DISCONTINUED | OUTPATIENT
Start: 2020-05-30 | End: 2020-05-29

## 2020-05-29 RX ORDER — ONDANSETRON 2 MG/ML
4 INJECTION INTRAMUSCULAR; INTRAVENOUS EVERY 6 HOURS PRN
Status: DISCONTINUED | OUTPATIENT
Start: 2020-05-29 | End: 2020-06-10 | Stop reason: HOSPADM

## 2020-05-29 RX ORDER — DEXTROSE AND SODIUM CHLORIDE 5; .45 G/100ML; G/100ML
50 INJECTION, SOLUTION INTRAVENOUS CONTINUOUS
Status: DISCONTINUED | OUTPATIENT
Start: 2020-05-29 | End: 2020-05-29

## 2020-05-29 RX ORDER — ACETAMINOPHEN 650 MG/1
650 SUPPOSITORY RECTAL ONCE
Status: COMPLETED | OUTPATIENT
Start: 2020-05-29 | End: 2020-05-29

## 2020-05-29 RX ADMIN — ACETAMINOPHEN 650 MG: 650 SUPPOSITORY RECTAL at 15:27

## 2020-05-29 RX ADMIN — CEFEPIME HYDROCHLORIDE 2000 MG: 2 INJECTION, POWDER, FOR SOLUTION INTRAVENOUS at 14:48

## 2020-05-29 RX ADMIN — VANCOMYCIN HYDROCHLORIDE 1250 MG: 5 INJECTION, POWDER, LYOPHILIZED, FOR SOLUTION INTRAVENOUS at 15:25

## 2020-05-30 LAB
ALBUMIN SERPL BCP-MCNC: 2 G/DL (ref 3.5–5)
ALP SERPL-CCNC: 63 U/L (ref 46–116)
ALT SERPL W P-5'-P-CCNC: 7 U/L (ref 12–78)
ANION GAP SERPL CALCULATED.3IONS-SCNC: 7 MMOL/L (ref 4–13)
AST SERPL W P-5'-P-CCNC: 17 U/L (ref 5–45)
BASOPHILS # BLD MANUAL: 0 THOUSAND/UL (ref 0–0.1)
BASOPHILS NFR MAR MANUAL: 0 % (ref 0–1)
BILIRUB SERPL-MCNC: 0.78 MG/DL (ref 0.2–1)
BUN SERPL-MCNC: 34 MG/DL (ref 5–25)
CALCIUM SERPL-MCNC: 8.1 MG/DL (ref 8.3–10.1)
CHLORIDE SERPL-SCNC: 110 MMOL/L (ref 100–108)
CO2 SERPL-SCNC: 29 MMOL/L (ref 21–32)
CREAT SERPL-MCNC: 1.55 MG/DL (ref 0.6–1.3)
CRP SERPL QL: 172.9 MG/L
D DIMER PPP FEU-MCNC: 3.04 UG/ML FEU
EOSINOPHIL # BLD MANUAL: 0 THOUSAND/UL (ref 0–0.4)
EOSINOPHIL NFR BLD MANUAL: 0 % (ref 0–6)
ERYTHROCYTE [DISTWIDTH] IN BLOOD BY AUTOMATED COUNT: 18.4 % (ref 11.6–15.1)
ERYTHROCYTE [DISTWIDTH] IN BLOOD BY AUTOMATED COUNT: 18.4 % (ref 11.6–15.1)
FERRITIN SERPL-MCNC: 314 NG/ML (ref 8–388)
GFR SERPL CREATININE-BSD FRML MDRD: 39 ML/MIN/1.73SQ M
GLUCOSE SERPL-MCNC: 100 MG/DL (ref 65–140)
HCT VFR BLD AUTO: 30.3 % (ref 36.5–49.3)
HCT VFR BLD AUTO: 30.8 % (ref 36.5–49.3)
HGB BLD-MCNC: 8.9 G/DL (ref 12–17)
HGB BLD-MCNC: 9 G/DL (ref 12–17)
LYMPHOCYTES # BLD AUTO: 0.79 THOUSAND/UL (ref 0.6–4.47)
LYMPHOCYTES # BLD AUTO: 10 % (ref 14–44)
MCH RBC QN AUTO: 28.4 PG (ref 26.8–34.3)
MCH RBC QN AUTO: 28.4 PG (ref 26.8–34.3)
MCHC RBC AUTO-ENTMCNC: 29.2 G/DL (ref 31.4–37.4)
MCHC RBC AUTO-ENTMCNC: 29.4 G/DL (ref 31.4–37.4)
MCV RBC AUTO: 97 FL (ref 82–98)
MCV RBC AUTO: 97 FL (ref 82–98)
MONOCYTES # BLD AUTO: 0.63 THOUSAND/UL (ref 0–1.22)
MONOCYTES NFR BLD: 8 % (ref 4–12)
NEUTROPHILS # BLD MANUAL: 6.45 THOUSAND/UL (ref 1.85–7.62)
NEUTS BAND NFR BLD MANUAL: 5 % (ref 0–8)
NEUTS SEG NFR BLD AUTO: 77 % (ref 43–75)
NRBC BLD AUTO-RTO: 0 /100 WBCS
PLATELET # BLD AUTO: 153 THOUSANDS/UL (ref 149–390)
PLATELET # BLD AUTO: 164 THOUSANDS/UL (ref 149–390)
PLATELET BLD QL SMEAR: ADEQUATE
PMV BLD AUTO: 10.2 FL (ref 8.9–12.7)
PMV BLD AUTO: 9.7 FL (ref 8.9–12.7)
POTASSIUM SERPL-SCNC: 4.5 MMOL/L (ref 3.5–5.3)
PROT SERPL-MCNC: 6.3 G/DL (ref 6.4–8.2)
RBC # BLD AUTO: 3.13 MILLION/UL (ref 3.88–5.62)
RBC # BLD AUTO: 3.17 MILLION/UL (ref 3.88–5.62)
RBC MORPH BLD: NORMAL
SODIUM SERPL-SCNC: 146 MMOL/L (ref 136–145)
TOTAL CELLS COUNTED SPEC: 100
VANCOMYCIN SERPL-MCNC: 9 UG/ML
WBC # BLD AUTO: 7.87 THOUSAND/UL (ref 4.31–10.16)
WBC # BLD AUTO: 8.51 THOUSAND/UL (ref 4.31–10.16)

## 2020-05-30 PROCEDURE — 99232 SBSQ HOSP IP/OBS MODERATE 35: CPT | Performed by: INTERNAL MEDICINE

## 2020-05-30 PROCEDURE — 85379 FIBRIN DEGRADATION QUANT: CPT | Performed by: INTERNAL MEDICINE

## 2020-05-30 PROCEDURE — 85027 COMPLETE CBC AUTOMATED: CPT | Performed by: INTERNAL MEDICINE

## 2020-05-30 PROCEDURE — 80202 ASSAY OF VANCOMYCIN: CPT | Performed by: INTERNAL MEDICINE

## 2020-05-30 PROCEDURE — 86140 C-REACTIVE PROTEIN: CPT | Performed by: INTERNAL MEDICINE

## 2020-05-30 PROCEDURE — 80053 COMPREHEN METABOLIC PANEL: CPT | Performed by: INTERNAL MEDICINE

## 2020-05-30 PROCEDURE — 85007 BL SMEAR W/DIFF WBC COUNT: CPT | Performed by: INTERNAL MEDICINE

## 2020-05-30 PROCEDURE — 82728 ASSAY OF FERRITIN: CPT | Performed by: INTERNAL MEDICINE

## 2020-05-30 RX ORDER — DEXTROSE MONOHYDRATE 50 MG/ML
50 INJECTION, SOLUTION INTRAVENOUS CONTINUOUS
Status: DISCONTINUED | OUTPATIENT
Start: 2020-05-30 | End: 2020-06-03

## 2020-05-30 RX ORDER — HEPARIN SODIUM 10000 [USP'U]/100ML
3-20 INJECTION, SOLUTION INTRAVENOUS
Status: DISCONTINUED | OUTPATIENT
Start: 2020-05-30 | End: 2020-05-31

## 2020-05-30 RX ADMIN — VANCOMYCIN HYDROCHLORIDE 1500 MG: 1 INJECTION, POWDER, LYOPHILIZED, FOR SOLUTION INTRAVENOUS at 16:07

## 2020-05-30 RX ADMIN — CEFEPIME HYDROCHLORIDE 2000 MG: 2 INJECTION, POWDER, FOR SOLUTION INTRAVENOUS at 14:48

## 2020-05-30 RX ADMIN — MORPHINE SULFATE 2 MG: 2 INJECTION, SOLUTION INTRAMUSCULAR; INTRAVENOUS at 10:57

## 2020-05-30 RX ADMIN — LORAZEPAM 0.5 MG: 2 INJECTION INTRAMUSCULAR; INTRAVENOUS at 00:16

## 2020-05-30 RX ADMIN — DEXTROSE 50 ML/HR: 5 SOLUTION INTRAVENOUS at 14:48

## 2020-05-30 RX ADMIN — HYDROMORPHONE HYDROCHLORIDE 0.5 MG: 1 INJECTION, SOLUTION INTRAMUSCULAR; INTRAVENOUS; SUBCUTANEOUS at 02:02

## 2020-05-30 RX ADMIN — LORAZEPAM 0.5 MG: 2 INJECTION INTRAMUSCULAR; INTRAVENOUS at 08:04

## 2020-05-31 ENCOUNTER — APPOINTMENT (INPATIENT)
Dept: CT IMAGING | Facility: HOSPITAL | Age: 85
DRG: 871 | End: 2020-05-31
Payer: MEDICARE

## 2020-05-31 LAB
APTT PPP: 43 SECONDS (ref 23–37)
INR PPP: 1.87 (ref 0.84–1.19)
PROTHROMBIN TIME: 20.7 SECONDS (ref 11.6–14.5)

## 2020-05-31 PROCEDURE — 85730 THROMBOPLASTIN TIME PARTIAL: CPT | Performed by: INTERNAL MEDICINE

## 2020-05-31 PROCEDURE — 70450 CT HEAD/BRAIN W/O DYE: CPT

## 2020-05-31 PROCEDURE — 85610 PROTHROMBIN TIME: CPT | Performed by: INTERNAL MEDICINE

## 2020-05-31 PROCEDURE — 99223 1ST HOSP IP/OBS HIGH 75: CPT | Performed by: INTERNAL MEDICINE

## 2020-05-31 PROCEDURE — 99233 SBSQ HOSP IP/OBS HIGH 50: CPT | Performed by: PHYSICIAN ASSISTANT

## 2020-05-31 RX ADMIN — HEPARIN SODIUM 12 UNITS/KG/HR: 10000 INJECTION, SOLUTION INTRAVENOUS at 03:30

## 2020-05-31 RX ADMIN — CEFEPIME HYDROCHLORIDE 2000 MG: 2 INJECTION, POWDER, FOR SOLUTION INTRAVENOUS at 01:52

## 2020-05-31 RX ADMIN — CEFEPIME HYDROCHLORIDE 2000 MG: 2 INJECTION, POWDER, FOR SOLUTION INTRAVENOUS at 15:11

## 2020-06-01 PROBLEM — E87.0 HYPERNATREMIA: Status: RESOLVED | Noted: 2020-04-21 | Resolved: 2020-06-01

## 2020-06-01 LAB
ANION GAP SERPL CALCULATED.3IONS-SCNC: 3 MMOL/L (ref 4–13)
BUN SERPL-MCNC: 29 MG/DL (ref 5–25)
CALCIUM SERPL-MCNC: 8.4 MG/DL (ref 8.3–10.1)
CHLORIDE SERPL-SCNC: 111 MMOL/L (ref 100–108)
CO2 SERPL-SCNC: 31 MMOL/L (ref 21–32)
CREAT SERPL-MCNC: 1.35 MG/DL (ref 0.6–1.3)
CRP SERPL QL: 130.7 MG/L
D DIMER PPP FEU-MCNC: 3.66 UG/ML FEU
ERYTHROCYTE [DISTWIDTH] IN BLOOD BY AUTOMATED COUNT: 17.9 % (ref 11.6–15.1)
FERRITIN SERPL-MCNC: 380 NG/ML (ref 8–388)
GFR SERPL CREATININE-BSD FRML MDRD: 46 ML/MIN/1.73SQ M
GLUCOSE SERPL-MCNC: 118 MG/DL (ref 65–140)
HCT VFR BLD AUTO: 30.9 % (ref 36.5–49.3)
HGB BLD-MCNC: 9 G/DL (ref 12–17)
MCH RBC QN AUTO: 28.3 PG (ref 26.8–34.3)
MCHC RBC AUTO-ENTMCNC: 29.1 G/DL (ref 31.4–37.4)
MCV RBC AUTO: 97 FL (ref 82–98)
PLATELET # BLD AUTO: 172 THOUSANDS/UL (ref 149–390)
PMV BLD AUTO: 10.2 FL (ref 8.9–12.7)
POTASSIUM SERPL-SCNC: 3.6 MMOL/L (ref 3.5–5.3)
PROCALCITONIN SERPL-MCNC: 0.45 NG/ML
RBC # BLD AUTO: 3.18 MILLION/UL (ref 3.88–5.62)
SODIUM SERPL-SCNC: 145 MMOL/L (ref 136–145)
WBC # BLD AUTO: 5.72 THOUSAND/UL (ref 4.31–10.16)

## 2020-06-01 PROCEDURE — 85027 COMPLETE CBC AUTOMATED: CPT | Performed by: PHYSICIAN ASSISTANT

## 2020-06-01 PROCEDURE — 80048 BASIC METABOLIC PNL TOTAL CA: CPT | Performed by: PHYSICIAN ASSISTANT

## 2020-06-01 PROCEDURE — 99232 SBSQ HOSP IP/OBS MODERATE 35: CPT | Performed by: INTERNAL MEDICINE

## 2020-06-01 PROCEDURE — 85379 FIBRIN DEGRADATION QUANT: CPT | Performed by: PHYSICIAN ASSISTANT

## 2020-06-01 PROCEDURE — 84145 PROCALCITONIN (PCT): CPT | Performed by: PHYSICIAN ASSISTANT

## 2020-06-01 PROCEDURE — 87081 CULTURE SCREEN ONLY: CPT | Performed by: INTERNAL MEDICINE

## 2020-06-01 PROCEDURE — 86140 C-REACTIVE PROTEIN: CPT | Performed by: PHYSICIAN ASSISTANT

## 2020-06-01 PROCEDURE — 99232 SBSQ HOSP IP/OBS MODERATE 35: CPT | Performed by: PHYSICIAN ASSISTANT

## 2020-06-01 PROCEDURE — 82728 ASSAY OF FERRITIN: CPT | Performed by: PHYSICIAN ASSISTANT

## 2020-06-01 RX ADMIN — MORPHINE SULFATE 1 MG: 2 INJECTION, SOLUTION INTRAMUSCULAR; INTRAVENOUS at 17:25

## 2020-06-01 RX ADMIN — DEXTROSE 50 ML/HR: 5 SOLUTION INTRAVENOUS at 01:59

## 2020-06-01 RX ADMIN — CEFEPIME HYDROCHLORIDE 2000 MG: 2 INJECTION, POWDER, FOR SOLUTION INTRAVENOUS at 13:11

## 2020-06-01 RX ADMIN — CEFEPIME HYDROCHLORIDE 2000 MG: 2 INJECTION, POWDER, FOR SOLUTION INTRAVENOUS at 01:59

## 2020-06-02 LAB
ANION GAP SERPL CALCULATED.3IONS-SCNC: 4 MMOL/L (ref 4–13)
BUN SERPL-MCNC: 24 MG/DL (ref 5–25)
CALCIUM SERPL-MCNC: 8.5 MG/DL (ref 8.3–10.1)
CHLORIDE SERPL-SCNC: 110 MMOL/L (ref 100–108)
CO2 SERPL-SCNC: 29 MMOL/L (ref 21–32)
CREAT SERPL-MCNC: 1.19 MG/DL (ref 0.6–1.3)
ERYTHROCYTE [DISTWIDTH] IN BLOOD BY AUTOMATED COUNT: 17.4 % (ref 11.6–15.1)
GFR SERPL CREATININE-BSD FRML MDRD: 53 ML/MIN/1.73SQ M
GLUCOSE SERPL-MCNC: 106 MG/DL (ref 65–140)
HCT VFR BLD AUTO: 30 % (ref 36.5–49.3)
HGB BLD-MCNC: 8.9 G/DL (ref 12–17)
MCH RBC QN AUTO: 28 PG (ref 26.8–34.3)
MCHC RBC AUTO-ENTMCNC: 29.7 G/DL (ref 31.4–37.4)
MCV RBC AUTO: 94 FL (ref 82–98)
MRSA NOSE QL CULT: NORMAL
PLATELET # BLD AUTO: 189 THOUSANDS/UL (ref 149–390)
PMV BLD AUTO: 10 FL (ref 8.9–12.7)
POTASSIUM SERPL-SCNC: 3.5 MMOL/L (ref 3.5–5.3)
PROCALCITONIN SERPL-MCNC: 0.23 NG/ML
RBC # BLD AUTO: 3.18 MILLION/UL (ref 3.88–5.62)
SODIUM SERPL-SCNC: 143 MMOL/L (ref 136–145)
WBC # BLD AUTO: 6.46 THOUSAND/UL (ref 4.31–10.16)

## 2020-06-02 PROCEDURE — 85027 COMPLETE CBC AUTOMATED: CPT | Performed by: INTERNAL MEDICINE

## 2020-06-02 PROCEDURE — 94002 VENT MGMT INPAT INIT DAY: CPT

## 2020-06-02 PROCEDURE — 84145 PROCALCITONIN (PCT): CPT | Performed by: INTERNAL MEDICINE

## 2020-06-02 PROCEDURE — 94760 N-INVAS EAR/PLS OXIMETRY 1: CPT

## 2020-06-02 PROCEDURE — 80048 BASIC METABOLIC PNL TOTAL CA: CPT | Performed by: INTERNAL MEDICINE

## 2020-06-02 PROCEDURE — 99232 SBSQ HOSP IP/OBS MODERATE 35: CPT | Performed by: PHYSICIAN ASSISTANT

## 2020-06-02 PROCEDURE — 99233 SBSQ HOSP IP/OBS HIGH 50: CPT | Performed by: INTERNAL MEDICINE

## 2020-06-02 PROCEDURE — 99232 SBSQ HOSP IP/OBS MODERATE 35: CPT | Performed by: INTERNAL MEDICINE

## 2020-06-02 RX ORDER — LORAZEPAM 2 MG/ML
0.5 INJECTION INTRAMUSCULAR ONCE
Status: COMPLETED | OUTPATIENT
Start: 2020-06-02 | End: 2020-06-02

## 2020-06-02 RX ADMIN — CEFEPIME HYDROCHLORIDE 2000 MG: 2 INJECTION, POWDER, FOR SOLUTION INTRAVENOUS at 00:04

## 2020-06-02 RX ADMIN — CEFEPIME HYDROCHLORIDE 2000 MG: 2 INJECTION, POWDER, FOR SOLUTION INTRAVENOUS at 12:39

## 2020-06-02 RX ADMIN — LORAZEPAM 0.5 MG: 2 INJECTION INTRAMUSCULAR; INTRAVENOUS at 22:44

## 2020-06-02 RX ADMIN — DEXTROSE 50 ML/HR: 5 SOLUTION INTRAVENOUS at 08:32

## 2020-06-03 PROBLEM — Z71.89 GOALS OF CARE, COUNSELING/DISCUSSION: Status: ACTIVE | Noted: 2020-06-03

## 2020-06-03 LAB
BACTERIA BLD CULT: NORMAL
BACTERIA BLD CULT: NORMAL

## 2020-06-03 PROCEDURE — 99232 SBSQ HOSP IP/OBS MODERATE 35: CPT | Performed by: PHYSICIAN ASSISTANT

## 2020-06-03 PROCEDURE — 94760 N-INVAS EAR/PLS OXIMETRY 1: CPT

## 2020-06-03 PROCEDURE — 94003 VENT MGMT INPAT SUBQ DAY: CPT

## 2020-06-03 PROCEDURE — 99233 SBSQ HOSP IP/OBS HIGH 50: CPT | Performed by: INTERNAL MEDICINE

## 2020-06-03 RX ORDER — LORAZEPAM 2 MG/ML
0.5 INJECTION INTRAMUSCULAR EVERY 2 HOUR PRN
Status: DISCONTINUED | OUTPATIENT
Start: 2020-06-03 | End: 2020-06-04

## 2020-06-03 RX ORDER — HYDROMORPHONE HCL/PF 1 MG/ML
0.5 SYRINGE (ML) INJECTION EVERY 2 HOUR PRN
Status: DISCONTINUED | OUTPATIENT
Start: 2020-06-03 | End: 2020-06-04

## 2020-06-03 RX ORDER — HALOPERIDOL 5 MG/ML
1 INJECTION INTRAMUSCULAR EVERY 2 HOUR PRN
Status: DISCONTINUED | OUTPATIENT
Start: 2020-06-03 | End: 2020-06-04

## 2020-06-03 RX ADMIN — CEFEPIME HYDROCHLORIDE 2000 MG: 2 INJECTION, POWDER, FOR SOLUTION INTRAVENOUS at 12:17

## 2020-06-03 RX ADMIN — Medication 0.2 MG/HR: at 13:52

## 2020-06-03 RX ADMIN — CEFEPIME HYDROCHLORIDE 2000 MG: 2 INJECTION, POWDER, FOR SOLUTION INTRAVENOUS at 00:30

## 2020-06-03 RX ADMIN — DEXTROSE 50 ML/HR: 5 SOLUTION INTRAVENOUS at 05:58

## 2020-06-04 LAB
ANION GAP SERPL CALCULATED.3IONS-SCNC: 6 MMOL/L (ref 4–13)
BUN SERPL-MCNC: 15 MG/DL (ref 5–25)
CALCIUM SERPL-MCNC: 8.2 MG/DL (ref 8.3–10.1)
CHLORIDE SERPL-SCNC: 107 MMOL/L (ref 100–108)
CO2 SERPL-SCNC: 30 MMOL/L (ref 21–32)
CREAT SERPL-MCNC: 1.12 MG/DL (ref 0.6–1.3)
ERYTHROCYTE [DISTWIDTH] IN BLOOD BY AUTOMATED COUNT: 18 % (ref 11.6–15.1)
GFR SERPL CREATININE-BSD FRML MDRD: 57 ML/MIN/1.73SQ M
GLUCOSE SERPL-MCNC: 88 MG/DL (ref 65–140)
HCT VFR BLD AUTO: 34 % (ref 36.5–49.3)
HGB BLD-MCNC: 9.9 G/DL (ref 12–17)
MCH RBC QN AUTO: 27.9 PG (ref 26.8–34.3)
MCHC RBC AUTO-ENTMCNC: 29.1 G/DL (ref 31.4–37.4)
MCV RBC AUTO: 96 FL (ref 82–98)
PLATELET # BLD AUTO: 218 THOUSANDS/UL (ref 149–390)
PMV BLD AUTO: 9.7 FL (ref 8.9–12.7)
POTASSIUM SERPL-SCNC: 3.9 MMOL/L (ref 3.5–5.3)
RBC # BLD AUTO: 3.55 MILLION/UL (ref 3.88–5.62)
SODIUM SERPL-SCNC: 143 MMOL/L (ref 136–145)
WBC # BLD AUTO: 8.17 THOUSAND/UL (ref 4.31–10.16)

## 2020-06-04 PROCEDURE — 85027 COMPLETE CBC AUTOMATED: CPT | Performed by: PHYSICIAN ASSISTANT

## 2020-06-04 PROCEDURE — 80048 BASIC METABOLIC PNL TOTAL CA: CPT | Performed by: PHYSICIAN ASSISTANT

## 2020-06-04 PROCEDURE — 99223 1ST HOSP IP/OBS HIGH 75: CPT | Performed by: NURSE PRACTITIONER

## 2020-06-04 PROCEDURE — 99232 SBSQ HOSP IP/OBS MODERATE 35: CPT | Performed by: PHYSICIAN ASSISTANT

## 2020-06-04 RX ORDER — DEXTROSE MONOHYDRATE 50 MG/ML
50 INJECTION, SOLUTION INTRAVENOUS CONTINUOUS
Status: DISCONTINUED | OUTPATIENT
Start: 2020-06-04 | End: 2020-06-08

## 2020-06-04 RX ORDER — HYDROMORPHONE HCL/PF 1 MG/ML
0.5 SYRINGE (ML) INJECTION EVERY 2 HOUR PRN
Status: DISCONTINUED | OUTPATIENT
Start: 2020-06-04 | End: 2020-06-04

## 2020-06-04 RX ORDER — HYDROMORPHONE HCL/PF 1 MG/ML
0.2 SYRINGE (ML) INJECTION EVERY 2 HOUR PRN
Status: DISCONTINUED | OUTPATIENT
Start: 2020-06-04 | End: 2020-06-10 | Stop reason: HOSPADM

## 2020-06-04 RX ORDER — LORAZEPAM 2 MG/ML
0.25 INJECTION INTRAMUSCULAR EVERY 2 HOUR PRN
Status: DISCONTINUED | OUTPATIENT
Start: 2020-06-04 | End: 2020-06-10 | Stop reason: HOSPADM

## 2020-06-04 RX ADMIN — DEXTROSE 50 ML/HR: 5 SOLUTION INTRAVENOUS at 14:23

## 2020-06-04 RX ADMIN — HYDROMORPHONE HYDROCHLORIDE 0.5 MG: 1 INJECTION, SOLUTION INTRAMUSCULAR; INTRAVENOUS; SUBCUTANEOUS at 03:56

## 2020-06-04 RX ADMIN — HYDROMORPHONE HYDROCHLORIDE 0.2 MG: 1 INJECTION, SOLUTION INTRAMUSCULAR; INTRAVENOUS; SUBCUTANEOUS at 14:22

## 2020-06-04 RX ADMIN — HYDROMORPHONE HYDROCHLORIDE 0.5 MG: 1 INJECTION, SOLUTION INTRAMUSCULAR; INTRAVENOUS; SUBCUTANEOUS at 06:35

## 2020-06-04 RX ADMIN — HYDROMORPHONE HYDROCHLORIDE 0.5 MG: 1 INJECTION, SOLUTION INTRAMUSCULAR; INTRAVENOUS; SUBCUTANEOUS at 09:49

## 2020-06-05 LAB
ANION GAP SERPL CALCULATED.3IONS-SCNC: 7 MMOL/L (ref 4–13)
BUN SERPL-MCNC: 15 MG/DL (ref 5–25)
CALCIUM SERPL-MCNC: 8.2 MG/DL (ref 8.3–10.1)
CHLORIDE SERPL-SCNC: 106 MMOL/L (ref 100–108)
CO2 SERPL-SCNC: 27 MMOL/L (ref 21–32)
CREAT SERPL-MCNC: 1.04 MG/DL (ref 0.6–1.3)
ERYTHROCYTE [DISTWIDTH] IN BLOOD BY AUTOMATED COUNT: 17.3 % (ref 11.6–15.1)
GFR SERPL CREATININE-BSD FRML MDRD: 62 ML/MIN/1.73SQ M
GLUCOSE SERPL-MCNC: 95 MG/DL (ref 65–140)
HCT VFR BLD AUTO: 30.3 % (ref 36.5–49.3)
HGB BLD-MCNC: 9.2 G/DL (ref 12–17)
MAGNESIUM SERPL-MCNC: 1.9 MG/DL (ref 1.6–2.6)
MCH RBC QN AUTO: 28.1 PG (ref 26.8–34.3)
MCHC RBC AUTO-ENTMCNC: 30.4 G/DL (ref 31.4–37.4)
MCV RBC AUTO: 93 FL (ref 82–98)
PLATELET # BLD AUTO: 207 THOUSANDS/UL (ref 149–390)
PMV BLD AUTO: 9.8 FL (ref 8.9–12.7)
POTASSIUM SERPL-SCNC: 3.5 MMOL/L (ref 3.5–5.3)
RBC # BLD AUTO: 3.27 MILLION/UL (ref 3.88–5.62)
SODIUM SERPL-SCNC: 140 MMOL/L (ref 136–145)
WBC # BLD AUTO: 7.29 THOUSAND/UL (ref 4.31–10.16)

## 2020-06-05 PROCEDURE — 99232 SBSQ HOSP IP/OBS MODERATE 35: CPT | Performed by: INTERNAL MEDICINE

## 2020-06-05 PROCEDURE — 83735 ASSAY OF MAGNESIUM: CPT | Performed by: PHYSICIAN ASSISTANT

## 2020-06-05 PROCEDURE — 99233 SBSQ HOSP IP/OBS HIGH 50: CPT | Performed by: NURSE PRACTITIONER

## 2020-06-05 PROCEDURE — 80048 BASIC METABOLIC PNL TOTAL CA: CPT | Performed by: PHYSICIAN ASSISTANT

## 2020-06-05 PROCEDURE — 85027 COMPLETE CBC AUTOMATED: CPT | Performed by: PHYSICIAN ASSISTANT

## 2020-06-05 RX ADMIN — DEXTROSE 50 ML/HR: 5 SOLUTION INTRAVENOUS at 10:06

## 2020-06-05 RX ADMIN — LORAZEPAM 0.25 MG: 2 INJECTION INTRAMUSCULAR; INTRAVENOUS at 10:48

## 2020-06-05 RX ADMIN — HYDROMORPHONE HYDROCHLORIDE 0.2 MG: 1 INJECTION, SOLUTION INTRAMUSCULAR; INTRAVENOUS; SUBCUTANEOUS at 05:42

## 2020-06-05 RX ADMIN — HYDROMORPHONE HYDROCHLORIDE 0.2 MG: 1 INJECTION, SOLUTION INTRAMUSCULAR; INTRAVENOUS; SUBCUTANEOUS at 10:03

## 2020-06-06 PROCEDURE — 99232 SBSQ HOSP IP/OBS MODERATE 35: CPT | Performed by: INTERNAL MEDICINE

## 2020-06-06 RX ADMIN — DEXTROSE 50 ML/HR: 5 SOLUTION INTRAVENOUS at 23:56

## 2020-06-06 RX ADMIN — APIXABAN 5 MG: 5 TABLET, FILM COATED ORAL at 18:23

## 2020-06-07 PROCEDURE — 99232 SBSQ HOSP IP/OBS MODERATE 35: CPT | Performed by: INTERNAL MEDICINE

## 2020-06-07 RX ADMIN — DEXTROSE 50 ML/HR: 5 SOLUTION INTRAVENOUS at 20:04

## 2020-06-07 RX ADMIN — APIXABAN 5 MG: 5 TABLET, FILM COATED ORAL at 17:39

## 2020-06-07 RX ADMIN — APIXABAN 5 MG: 5 TABLET, FILM COATED ORAL at 08:42

## 2020-06-08 PROCEDURE — 99232 SBSQ HOSP IP/OBS MODERATE 35: CPT | Performed by: INTERNAL MEDICINE

## 2020-06-08 PROCEDURE — 99232 SBSQ HOSP IP/OBS MODERATE 35: CPT | Performed by: NURSE PRACTITIONER

## 2020-06-08 RX ADMIN — APIXABAN 5 MG: 5 TABLET, FILM COATED ORAL at 17:17

## 2020-06-08 RX ADMIN — APIXABAN 5 MG: 5 TABLET, FILM COATED ORAL at 09:18

## 2020-06-08 RX ADMIN — ACETAMINOPHEN 650 MG: 650 SUPPOSITORY RECTAL at 22:40

## 2020-06-09 PROBLEM — R63.8 DECREASED ORAL INTAKE: Status: ACTIVE | Noted: 2020-06-09

## 2020-06-09 LAB
ANION GAP SERPL CALCULATED.3IONS-SCNC: 5 MMOL/L (ref 4–13)
BUN SERPL-MCNC: 9 MG/DL (ref 5–25)
CALCIUM SERPL-MCNC: 8.1 MG/DL (ref 8.3–10.1)
CHLORIDE SERPL-SCNC: 104 MMOL/L (ref 100–108)
CO2 SERPL-SCNC: 28 MMOL/L (ref 21–32)
CREAT SERPL-MCNC: 0.9 MG/DL (ref 0.6–1.3)
GFR SERPL CREATININE-BSD FRML MDRD: 74 ML/MIN/1.73SQ M
GLUCOSE SERPL-MCNC: 102 MG/DL (ref 65–140)
POTASSIUM SERPL-SCNC: 3.3 MMOL/L (ref 3.5–5.3)
SODIUM SERPL-SCNC: 137 MMOL/L (ref 136–145)

## 2020-06-09 PROCEDURE — 99232 SBSQ HOSP IP/OBS MODERATE 35: CPT | Performed by: INTERNAL MEDICINE

## 2020-06-09 PROCEDURE — 80048 BASIC METABOLIC PNL TOTAL CA: CPT | Performed by: INTERNAL MEDICINE

## 2020-06-09 RX ORDER — ACETAMINOPHEN 325 MG/1
975 TABLET ORAL EVERY 8 HOURS SCHEDULED
Status: DISCONTINUED | OUTPATIENT
Start: 2020-06-09 | End: 2020-06-10 | Stop reason: HOSPADM

## 2020-06-09 RX ADMIN — APIXABAN 5 MG: 5 TABLET, FILM COATED ORAL at 18:08

## 2020-06-09 RX ADMIN — ACETAMINOPHEN 975 MG: 325 TABLET, FILM COATED ORAL at 21:56

## 2020-06-09 RX ADMIN — APIXABAN 5 MG: 5 TABLET, FILM COATED ORAL at 09:24

## 2020-06-09 RX ADMIN — ACETAMINOPHEN 975 MG: 325 TABLET, FILM COATED ORAL at 13:19

## 2020-06-09 RX ADMIN — HYDROMORPHONE HYDROCHLORIDE 0.2 MG: 1 INJECTION, SOLUTION INTRAMUSCULAR; INTRAVENOUS; SUBCUTANEOUS at 10:32

## 2020-06-10 VITALS
WEIGHT: 180.78 LBS | RESPIRATION RATE: 18 BRPM | BODY MASS INDEX: 24.52 KG/M2 | DIASTOLIC BLOOD PRESSURE: 78 MMHG | HEART RATE: 65 BPM | SYSTOLIC BLOOD PRESSURE: 140 MMHG | TEMPERATURE: 97.5 F | OXYGEN SATURATION: 98 %

## 2020-06-10 LAB
ALBUMIN SERPL BCP-MCNC: 1.8 G/DL (ref 3.5–5)
ALP SERPL-CCNC: 60 U/L (ref 46–116)
ALT SERPL W P-5'-P-CCNC: <6 U/L (ref 12–78)
ANION GAP SERPL CALCULATED.3IONS-SCNC: 6 MMOL/L (ref 4–13)
AST SERPL W P-5'-P-CCNC: 25 U/L (ref 5–45)
BILIRUB SERPL-MCNC: 0.74 MG/DL (ref 0.2–1)
BUN SERPL-MCNC: 10 MG/DL (ref 5–25)
CALCIUM SERPL-MCNC: 7.9 MG/DL (ref 8.3–10.1)
CHLORIDE SERPL-SCNC: 105 MMOL/L (ref 100–108)
CO2 SERPL-SCNC: 27 MMOL/L (ref 21–32)
CREAT SERPL-MCNC: 0.94 MG/DL (ref 0.6–1.3)
GFR SERPL CREATININE-BSD FRML MDRD: 71 ML/MIN/1.73SQ M
GLUCOSE SERPL-MCNC: 83 MG/DL (ref 65–140)
POTASSIUM SERPL-SCNC: 3.6 MMOL/L (ref 3.5–5.3)
PROT SERPL-MCNC: 6.1 G/DL (ref 6.4–8.2)
SODIUM SERPL-SCNC: 138 MMOL/L (ref 136–145)

## 2020-06-10 PROCEDURE — 80053 COMPREHEN METABOLIC PANEL: CPT | Performed by: INTERNAL MEDICINE

## 2020-06-10 PROCEDURE — 99239 HOSP IP/OBS DSCHRG MGMT >30: CPT | Performed by: INTERNAL MEDICINE

## 2020-06-10 RX ORDER — HYDROMORPHONE HYDROCHLORIDE 2 MG/1
1 TABLET ORAL EVERY 4 HOURS PRN
Qty: 30 TABLET | Refills: 0
Start: 2020-06-10 | End: 2020-06-20

## 2020-06-10 RX ORDER — ACETAMINOPHEN 500 MG
1000 TABLET ORAL EVERY 8 HOURS SCHEDULED
Start: 2020-06-10

## 2020-06-10 RX ADMIN — ACETAMINOPHEN 975 MG: 325 TABLET, FILM COATED ORAL at 13:38

## 2020-06-10 RX ADMIN — ACETAMINOPHEN 975 MG: 325 TABLET, FILM COATED ORAL at 04:27

## 2020-06-10 RX ADMIN — LORAZEPAM 0.25 MG: 2 INJECTION INTRAMUSCULAR; INTRAVENOUS at 10:13

## 2020-06-10 RX ADMIN — APIXABAN 5 MG: 5 TABLET, FILM COATED ORAL at 10:00

## 2020-06-11 ENCOUNTER — PATIENT OUTREACH (OUTPATIENT)
Dept: CASE MANAGEMENT | Facility: HOSPITAL | Age: 85
End: 2020-06-11

## 2020-06-12 ENCOUNTER — PATIENT OUTREACH (OUTPATIENT)
Dept: CASE MANAGEMENT | Facility: HOSPITAL | Age: 85
End: 2020-06-12

## 2020-06-19 ENCOUNTER — PATIENT OUTREACH (OUTPATIENT)
Dept: CASE MANAGEMENT | Facility: HOSPITAL | Age: 85
End: 2020-06-19

## 2022-05-04 NOTE — DISCHARGE INSTRUCTIONS
Skin Tear   WHAT YOU NEED TO KNOW:   A skin tear occurs when the layers of weakened skin split open from an injury  , elderly, and chronically or critically ill people are at higher risk for skin tears  Long-term use of steroids can also increase the risk  It is important to treat and prevent skin tears to prevent infection  DISCHARGE INSTRUCTIONS:   Medicines:   · Medicines  may be given to reduce your pain or to treat or prevent an infection  Do not wait until the pain is severe before you ask for more medicine  · Take your medicine as directed  Contact your healthcare provider if you think your medicine is not helping or if you have side effects  Tell him of her if you are allergic to any medicine  Keep a list of the medicines, vitamins, and herbs you take  Include the amounts, and when and why you take them  Bring the list or the pill bottles to follow-up visits  Carry your medicine list with you in case of an emergency  Follow up with your healthcare provider as directed:  Write down your questions so you remember to ask them during your visits  Wound care: You may be referred to a wound specialist who will teach you how to clean your wound properly  Ask for more information about wound care  Prevent another skin tear:   · Clean, moisturize, and protect your skin  Baths, hot showers, and soap can dry your skin and increase your risk for skin tears  Take tepid showers, use mild soap as directed, and gently pat your skin dry  Use lotion to keep your skin moist after you shower  Wear long sleeves, pants, and protective footwear  · Move carefully  Ask for help if you cannot lift yourself well enough to avoid dragging your skin when you move  · Keep your home safe  Cover sharp corners, keep your pathways clear, and turn on lights so you can see clearly  Ask for more information if you have questions about home safety  · Drink liquids as directed    Ask your healthcare provider how much liquid to drink each day and which liquids are best for you  Liquids will help keep your skin moist and protected from future skin tears  · Eat high-protein foods  Examples are lean meats, fish, low-fat dairy products, and beans  A dietitian may help you create high-protein meal plans to help with wound healing  Contact your healthcare provider if:   · You have redness, swelling, pus, or a bad odor coming from your wound  · Blood soaks through your bandage  · You have increased pain, even after you take pain medicine  · Your wound tears open again  Return to the emergency department if:   · You have a fever  © 2017 [address] Information is for End User's use only and may not be sold, redistributed or otherwise used for commercial purposes  All illustrations and images included in CareNotes® are the copyrighted property of [employer]  or [employer] Alcantara  The above information is an  only  It is not intended as medical advice for individual conditions or treatments  Talk to your doctor, nurse or pharmacist before following any medical regimen to see if it is safe and effective for you  Fall Prevention   WHAT YOU NEED TO KNOW:   Fall prevention includes ways to make your home and other areas safer  It also includes ways you can move more carefully to prevent a fall  Health conditions that cause changes in your blood pressure, vision, or muscle strength and coordination may increase your risk for falls  Medicines may also increase your risk for falls if they make you dizzy, weak, or sleepy  DISCHARGE INSTRUCTIONS:   Call 911 or have someone else call if:   · You have fallen and are unconscious  · You have fallen and cannot move part of your body  Contact your healthcare provider if:   · You have fallen and have pain or a headache  · You have questions or concerns about your condition or care    Fall prevention tips:   · Stand or sit up slowly  This may help you keep your balance and prevent falls  · Use assistive devices as directed  Your healthcare provider may suggest that you use a cane or walker to help you keep your balance  You may need to have grab bars put in your bathroom near the toilet or in the shower  · Wear shoes that fit well and have soles that   Wear shoes both inside and outside  Use slippers with good   Do not wear shoes with high heels  · Wear a personal alarm  This is a device that allows you to call 911 if you fall and need help  Ask your healthcare provider for more information  · Stay active  Exercise can help strengthen your muscles and improve your balance  Your healthcare provider may recommend water aerobics or walking  He or she may also recommend physical therapy to improve your coordination  Never start an exercise program without talking to your healthcare provider first      · Manage your medical conditions  Keep all appointments with your healthcare providers  Visit your eye doctor as directed  Home safety tips:   · Add items to prevent falls in the bathroom  Put nonslip strips on your bath or shower floor to prevent you from slipping  Use a bath mat if you do not have carpet in the bathroom  This will prevent you from falling when you step out of the bath or shower  Use a shower seat so you do not need to stand while you shower  Sit on the toilet or a chair in your bathroom to dry yourself and put on clothing  This will prevent you from losing your balance from drying or dressing yourself while you are standing  · Keep paths clear  Remove books, shoes, and other objects from walkways and stairs  Place cords for telephones and lamps out of the way so that you do not need to walk over them  Tape them down if you cannot move them  Remove small rugs  If you cannot remove a rug, secure it with double-sided tape  This will prevent you from tripping       · Install bright lights in your home  Use night lights to help light paths to the bathroom or kitchen  Always turn on the light before you start walking  · Keep items you use often on shelves within reach  Do not use a step stool to help you reach an item  · Paint or place reflective tape on the edges of your stairs  This will help you see the stairs better  Follow up with your healthcare provider as directed:  Write down your questions so you remember to ask them during your visits  © 2017 [address] Information is for End User's use only and may not be sold, redistributed or otherwise used for commercial purposes  All illustrations and images included in CareNotes® are the copyrighted property of [employer] M [employer]  or [employer]  The above information is an  only  It is not intended as medical advice for individual conditions or treatments  Talk to your doctor, nurse or pharmacist before following any medical regimen to see if it is safe and effective for you  No